# Patient Record
Sex: MALE | Race: BLACK OR AFRICAN AMERICAN | NOT HISPANIC OR LATINO | Employment: OTHER | RURAL
[De-identification: names, ages, dates, MRNs, and addresses within clinical notes are randomized per-mention and may not be internally consistent; named-entity substitution may affect disease eponyms.]

---

## 2020-03-23 ENCOUNTER — HISTORICAL (OUTPATIENT)
Dept: ADMINISTRATIVE | Facility: HOSPITAL | Age: 70
End: 2020-03-23

## 2020-03-23 LAB
APTT PPP: 25.2 SECONDS (ref 25.2–37.3)
BASOPHILS # BLD AUTO: 0.05 X10E3/UL (ref 0–0.2)
BASOPHILS NFR BLD AUTO: 0.9 % (ref 0–1)
BUN SERPL-MCNC: 32 MG/DL (ref 7–18)
CALCIUM SERPL-MCNC: 9.3 MG/DL (ref 8.5–10.1)
CHLORIDE SERPL-SCNC: 102 MMOL/L (ref 98–107)
CK MB SERPL-MCNC: 2.2 NG/ML (ref 1–3.6)
CK MB SERPL-MCNC: 2.6 NG/ML (ref 1–3.6)
CK MB SERPL-MCNC: 2.6 NG/ML (ref 1–3.6)
CK SERPL-CCNC: 161 U/L (ref 39–308)
CK SERPL-CCNC: 179 U/L (ref 39–308)
CK SERPL-CCNC: 181 U/L (ref 39–308)
CO2 SERPL-SCNC: 29 MMOL/L (ref 21–32)
CREAT SERPL-MCNC: 8.41 MG/DL (ref 0.7–1.3)
EOSINOPHIL # BLD AUTO: 0.08 X10E3/UL (ref 0–0.5)
EOSINOPHIL NFR BLD AUTO: 1.4 % (ref 1–4)
ERYTHROCYTE [DISTWIDTH] IN BLOOD BY AUTOMATED COUNT: 15.6 % (ref 11.5–14.5)
GLUCOSE SERPL-MCNC: 95 MG/DL (ref 74–106)
HCT VFR BLD AUTO: 36.6 % (ref 40–54)
HGB BLD-MCNC: 11.3 G/DL (ref 13.5–18)
IMM GRANULOCYTES # BLD AUTO: 0.02 X10E3/UL (ref 0–0.04)
IMM GRANULOCYTES NFR BLD: 0.3 % (ref 0–0.4)
INR BLD: 0.95 (ref 0–3.3)
LYMPHOCYTES # BLD AUTO: 1.1 X10E3/UL (ref 1–4.8)
LYMPHOCYTES NFR BLD AUTO: 18.8 % (ref 27–41)
MAGNESIUM SERPL-MCNC: 2.8 MG/DL (ref 1.7–2.3)
MCH RBC QN AUTO: 30.5 PG (ref 27–31)
MCHC RBC AUTO-ENTMCNC: 30.9 G/DL (ref 32–36)
MCV RBC AUTO: 98.7 FL (ref 80–96)
MONOCYTES # BLD AUTO: 0.38 X10E3/UL (ref 0–0.8)
MONOCYTES NFR BLD AUTO: 6.5 % (ref 2–6)
MPC BLD CALC-MCNC: 11.5 FL (ref 9.4–12.4)
MYOGLOBIN SERPL-MCNC: 329 NG/ML (ref 16–116)
NEUTROPHILS # BLD AUTO: 4.21 X10E3/UL (ref 1.8–7.7)
NEUTROPHILS NFR BLD AUTO: 72.1 % (ref 53–65)
NRBC # BLD AUTO: 0 X10E3/UL (ref 0–0)
NRBC, AUTO (.00): 0 /100 (ref 0–0)
PLATELET # BLD AUTO: 153 X10E3/UL (ref 150–400)
POTASSIUM SERPL-SCNC: 4.5 MMOL/L (ref 3.5–5.1)
PROTHROMBIN TIME: 12.8 SECONDS (ref 11.7–14.7)
RBC # BLD AUTO: 3.71 X10E6/UL (ref 4.6–6.2)
SODIUM SERPL-SCNC: 141 MMOL/L (ref 136–145)
TROPONIN I SERPL-MCNC: 0.06 NG/ML (ref 0–0.06)
TROPONIN I SERPL-MCNC: 0.07 NG/ML (ref 0–0.06)
TROPONIN I SERPL-MCNC: 0.08 NG/ML (ref 0–0.06)
WBC # BLD AUTO: 5.84 X10E3/UL (ref 4.5–11)

## 2020-03-24 ENCOUNTER — HISTORICAL (OUTPATIENT)
Dept: ADMINISTRATIVE | Facility: HOSPITAL | Age: 70
End: 2020-03-24

## 2020-03-24 LAB
ANISOCYTOSIS BLD QL SMEAR: ABNORMAL
BASOPHILS # BLD AUTO: 0.03 X10E3/UL (ref 0–0.2)
BASOPHILS NFR BLD AUTO: 0.7 % (ref 0–1)
BUN SERPL-MCNC: 19 MG/DL (ref 7–18)
CALCIUM SERPL-MCNC: 8.5 MG/DL (ref 8.5–10.1)
CHLORIDE SERPL-SCNC: 103 MMOL/L (ref 98–107)
CO2 SERPL-SCNC: 27 MMOL/L (ref 21–32)
CREAT SERPL-MCNC: 5.29 MG/DL (ref 0.7–1.3)
EOSINOPHIL # BLD AUTO: 0.04 X10E3/UL (ref 0–0.5)
EOSINOPHIL NFR BLD AUTO: 0.9 % (ref 1–4)
ERYTHROCYTE [DISTWIDTH] IN BLOOD BY AUTOMATED COUNT: 15.5 % (ref 11.5–14.5)
GLUCOSE SERPL-MCNC: 121 MG/DL (ref 74–106)
HCT VFR BLD AUTO: 33.4 % (ref 40–54)
HGB BLD-MCNC: 10.6 G/DL (ref 13.5–18)
IMM GRANULOCYTES # BLD AUTO: 0.01 X10E3/UL (ref 0–0.04)
IMM GRANULOCYTES NFR BLD: 0.2 % (ref 0–0.4)
LYMPHOCYTES # BLD AUTO: 1.18 X10E3/UL (ref 1–4.8)
LYMPHOCYTES NFR BLD AUTO: 26 % (ref 27–41)
MCH RBC QN AUTO: 30.9 PG (ref 27–31)
MCHC RBC AUTO-ENTMCNC: 31.7 G/DL (ref 32–36)
MCV RBC AUTO: 97.4 FL (ref 80–96)
MONOCYTES # BLD AUTO: 0.48 X10E3/UL (ref 0–0.8)
MONOCYTES NFR BLD AUTO: 10.6 % (ref 2–6)
MPC BLD CALC-MCNC: 11.7 FL (ref 9.4–12.4)
NEUTROPHILS # BLD AUTO: 2.79 X10E3/UL (ref 1.8–7.7)
NEUTROPHILS NFR BLD AUTO: 61.6 % (ref 53–65)
NRBC # BLD AUTO: 0 X10E3/UL (ref 0–0)
NRBC, AUTO (.00): 0 /100 (ref 0–0)
PLATELET # BLD AUTO: 116 X10E3/UL (ref 150–400)
PLATELET MORPHOLOGY: ABNORMAL
POTASSIUM SERPL-SCNC: 4.5 MMOL/L (ref 3.5–5.1)
RBC # BLD AUTO: 3.43 X10E6/UL (ref 4.6–6.2)
SODIUM SERPL-SCNC: 137 MMOL/L (ref 136–145)
WBC # BLD AUTO: 4.53 X10E3/UL (ref 4.5–11)

## 2020-04-04 ENCOUNTER — HISTORICAL (OUTPATIENT)
Dept: ADMINISTRATIVE | Facility: HOSPITAL | Age: 70
End: 2020-04-04

## 2020-04-04 LAB
ALBUMIN SERPL BCP-MCNC: 3.7 G/DL (ref 3.5–5)
ALBUMIN/GLOB SERPL: 1.1 {RATIO}
ALP SERPL-CCNC: 57 U/L (ref 45–115)
ALT SERPL W P-5'-P-CCNC: 10 U/L (ref 16–61)
ANISOCYTOSIS BLD QL SMEAR: ABNORMAL
APTT PPP: 32 SECONDS (ref 25.2–37.3)
AST SERPL W P-5'-P-CCNC: 18 U/L (ref 15–37)
BASOPHILS # BLD AUTO: 0.04 X10E3/UL (ref 0–0.2)
BASOPHILS NFR BLD AUTO: 1 % (ref 0–1)
BILIRUB SERPL-MCNC: 0.2 MG/DL (ref 0–1.2)
BUN SERPL-MCNC: 27 MG/DL (ref 7–18)
BUN/CREAT SERPL: 3.4
CALCIUM SERPL-MCNC: 9.1 MG/DL (ref 8.5–10.1)
CHLORIDE SERPL-SCNC: 100 MMOL/L (ref 98–107)
CK MB SERPL-MCNC: 1.3 NG/ML (ref 1–3.6)
CK SERPL-CCNC: 162 U/L (ref 39–308)
CO2 SERPL-SCNC: 30 MMOL/L (ref 21–32)
CREAT SERPL-MCNC: 7.89 MG/DL (ref 0.7–1.3)
EOSINOPHIL # BLD AUTO: 0.07 X10E3/UL (ref 0–0.5)
EOSINOPHIL NFR BLD AUTO: 1.7 % (ref 1–4)
ERYTHROCYTE [DISTWIDTH] IN BLOOD BY AUTOMATED COUNT: 14.6 % (ref 11.5–14.5)
GLOBULIN SER-MCNC: 3.5 G/DL (ref 2–4)
GLUCOSE SERPL-MCNC: 107 MG/DL (ref 74–106)
HCT VFR BLD AUTO: 33.6 % (ref 40–54)
HGB BLD-MCNC: 10.8 G/DL (ref 13.5–18)
IMM GRANULOCYTES # BLD AUTO: 0.01 X10E3/UL (ref 0–0.04)
IMM GRANULOCYTES NFR BLD: 0.2 % (ref 0–0.4)
INR BLD: 0.95 (ref 0–3.3)
LYMPHOCYTES # BLD AUTO: 1.48 X10E3/UL (ref 1–4.8)
LYMPHOCYTES NFR BLD AUTO: 36.2 % (ref 27–41)
MAGNESIUM SERPL-MCNC: 2.7 MG/DL (ref 1.7–2.3)
MCH RBC QN AUTO: 31.3 PG (ref 27–31)
MCHC RBC AUTO-ENTMCNC: 32.1 G/DL (ref 32–36)
MCV RBC AUTO: 97.4 FL (ref 80–96)
MONOCYTES # BLD AUTO: 0.4 X10E3/UL (ref 0–0.8)
MONOCYTES NFR BLD AUTO: 9.8 % (ref 2–6)
MPC BLD CALC-MCNC: 12.1 FL (ref 9.4–12.4)
MYOGLOBIN SERPL-MCNC: 341 NG/ML (ref 16–116)
NEUTROPHILS # BLD AUTO: 2.09 X10E3/UL (ref 1.8–7.7)
NEUTROPHILS NFR BLD AUTO: 51.1 % (ref 53–65)
NRBC # BLD AUTO: 0 X10E3/UL (ref 0–0)
NRBC, AUTO (.00): 0 /100 (ref 0–0)
NT-PROBNP SERPL-MCNC: ABNORMAL PG/ML (ref 1–125)
OVALOCYTES BLD QL SMEAR: ABNORMAL
PLATELET # BLD AUTO: 125 X10E3/UL (ref 150–400)
PLATELET MORPHOLOGY: ABNORMAL
POTASSIUM SERPL-SCNC: 3.7 MMOL/L (ref 3.5–5.1)
PROT SERPL-MCNC: 7.2 G/DL (ref 6.4–8.2)
PROTHROMBIN TIME: 12.8 SECONDS (ref 11.7–14.7)
RBC # BLD AUTO: 3.45 X10E6/UL (ref 4.6–6.2)
SODIUM SERPL-SCNC: 140 MMOL/L (ref 136–145)
TROPONIN I SERPL-MCNC: 0.07 NG/ML (ref 0–0.06)
WBC # BLD AUTO: 4.09 X10E3/UL (ref 4.5–11)

## 2020-04-05 ENCOUNTER — HISTORICAL (OUTPATIENT)
Dept: ADMINISTRATIVE | Facility: HOSPITAL | Age: 70
End: 2020-04-05

## 2020-04-05 LAB — TROPONIN I SERPL-MCNC: 0.07 NG/ML (ref 0–0.06)

## 2020-07-06 ENCOUNTER — HISTORICAL (OUTPATIENT)
Dept: ADMINISTRATIVE | Facility: HOSPITAL | Age: 70
End: 2020-07-06

## 2020-07-06 LAB
HGB BLD-MCNC: 9.7 G/DL (ref 13.5–18)
POTASSIUM SERPL-SCNC: 4.5 MMOL/L (ref 3.5–5.1)

## 2020-12-01 ENCOUNTER — HISTORICAL (OUTPATIENT)
Dept: ADMINISTRATIVE | Facility: HOSPITAL | Age: 70
End: 2020-12-01

## 2020-12-01 LAB
ALBUMIN SERPL BCP-MCNC: 3.5 G/DL (ref 3.5–5)
ALP SERPL-CCNC: 66 U/L (ref 45–115)
ALT SERPL W P-5'-P-CCNC: 17 U/L (ref 16–61)
ANION GAP SERPL CALCULATED.3IONS-SCNC: 9 MMOL/L
APTT PPP: 34 SECONDS (ref 25.2–37.3)
AST SERPL W P-5'-P-CCNC: 21 U/L (ref 15–37)
BASOPHILS # BLD AUTO: 0.04 X10E3/UL (ref 0–0.2)
BASOPHILS NFR BLD AUTO: 0.7 % (ref 0–1)
BILIRUB DIRECT SERPL-MCNC: 0.1 MG/DL (ref 0–0.2)
BILIRUB SERPL-MCNC: 0.3 MG/DL (ref 0–1.2)
BUN SERPL-MCNC: 25 MG/DL (ref 7–18)
CALCIUM SERPL-MCNC: 8.2 MG/DL (ref 8.5–10.1)
CHLORIDE SERPL-SCNC: 101 MMOL/L (ref 98–107)
CK MB SERPL-MCNC: 2.2 NG/ML (ref 1–3.6)
CK SERPL-CCNC: 284 U/L (ref 39–308)
CO2 SERPL-SCNC: 33 MMOL/L (ref 21–32)
CREAT SERPL-MCNC: 8.34 MG/DL (ref 0.7–1.3)
EOSINOPHIL # BLD AUTO: 0.12 X10E3/UL (ref 0–0.5)
EOSINOPHIL NFR BLD AUTO: 2.2 % (ref 1–4)
ERYTHROCYTE [DISTWIDTH] IN BLOOD BY AUTOMATED COUNT: 14.6 % (ref 11.5–14.5)
GLUCOSE SERPL-MCNC: 101 MG/DL (ref 74–106)
HCT VFR BLD AUTO: 28.2 % (ref 40–54)
HGB BLD-MCNC: 9.2 G/DL (ref 13.5–18)
IMM GRANULOCYTES # BLD AUTO: 0.01 X10E3/UL (ref 0–0.04)
IMM GRANULOCYTES NFR BLD: 0.2 % (ref 0–0.4)
INR BLD: 1.05 (ref 0–3.3)
LYMPHOCYTES # BLD AUTO: 1.27 X10E3/UL (ref 1–4.8)
LYMPHOCYTES NFR BLD AUTO: 23.4 % (ref 27–41)
MAGNESIUM SERPL-MCNC: 2.8 MG/DL (ref 1.7–2.3)
MCH RBC QN AUTO: 32.4 PG (ref 27–31)
MCHC RBC AUTO-ENTMCNC: 32.6 G/DL (ref 32–36)
MCV RBC AUTO: 99.3 FL (ref 80–96)
MONOCYTES # BLD AUTO: 0.46 X10E3/UL (ref 0–0.8)
MONOCYTES NFR BLD AUTO: 8.5 % (ref 2–6)
MPC BLD CALC-MCNC: 11.3 FL (ref 9.4–12.4)
MYOGLOBIN SERPL-MCNC: 622 NG/ML (ref 16–116)
NEUTROPHILS # BLD AUTO: 3.52 X10E3/UL (ref 1.8–7.7)
NEUTROPHILS NFR BLD AUTO: 65 % (ref 53–65)
NRBC # BLD AUTO: 0 X10E3/UL (ref 0–0)
NRBC, AUTO (.00): 0 /100 (ref 0–0)
PLATELET # BLD AUTO: 142 X10E3/UL (ref 150–400)
POTASSIUM SERPL-SCNC: 4.4 MMOL/L (ref 3.5–5.1)
PROT SERPL-MCNC: 6.7 G/DL (ref 6.4–8.2)
PROTHROMBIN TIME: 13.2 SECONDS (ref 11.7–14.7)
RBC # BLD AUTO: 2.84 X10E6/UL (ref 4.6–6.2)
SARS-COV-2 RNA AMPLIFICATION, QUAL: NEGATIVE
SODIUM SERPL-SCNC: 139 MMOL/L (ref 136–145)
TROPONIN I SERPL-MCNC: 0.08 NG/ML (ref 0–0.06)
WBC # BLD AUTO: 5.42 X10E3/UL (ref 4.5–11)

## 2020-12-02 ENCOUNTER — HISTORICAL (OUTPATIENT)
Dept: ADMINISTRATIVE | Facility: HOSPITAL | Age: 70
End: 2020-12-02

## 2020-12-02 LAB
ANION GAP SERPL CALCULATED.3IONS-SCNC: 11 MMOL/L
ANISOCYTOSIS BLD QL SMEAR: ABNORMAL
BASOPHILS # BLD AUTO: 0.05 X10E3/UL (ref 0–0.2)
BASOPHILS NFR BLD AUTO: 1.1 % (ref 0–1)
BUN SERPL-MCNC: 30 MG/DL (ref 7–18)
CALCIUM SERPL-MCNC: 8 MG/DL (ref 8.5–10.1)
CHLORIDE SERPL-SCNC: 104 MMOL/L (ref 98–107)
CO2 SERPL-SCNC: 30 MMOL/L (ref 21–32)
CREAT SERPL-MCNC: 9.15 MG/DL (ref 0.7–1.3)
EOSINOPHIL # BLD AUTO: 0.12 X10E3/UL (ref 0–0.5)
EOSINOPHIL NFR BLD AUTO: 2.6 % (ref 1–4)
ERYTHROCYTE [DISTWIDTH] IN BLOOD BY AUTOMATED COUNT: 14.8 % (ref 11.5–14.5)
GLUCOSE SERPL-MCNC: 88 MG/DL (ref 74–106)
HCT VFR BLD AUTO: 27.2 % (ref 40–54)
HGB BLD-MCNC: 8.8 G/DL (ref 13.5–18)
HYPOCHROMIA BLD QL SMEAR: SLIGHT
IMM GRANULOCYTES # BLD AUTO: 0.01 X10E3/UL (ref 0–0.04)
IMM GRANULOCYTES NFR BLD: 0.2 % (ref 0–0.4)
LYMPHOCYTES # BLD AUTO: 1.32 X10E3/UL (ref 1–4.8)
LYMPHOCYTES NFR BLD AUTO: 28.6 % (ref 27–41)
MCH RBC QN AUTO: 32.7 PG (ref 27–31)
MCHC RBC AUTO-ENTMCNC: 32.4 G/DL (ref 32–36)
MCV RBC AUTO: 101.1 FL (ref 80–96)
MONOCYTES # BLD AUTO: 0.46 X10E3/UL (ref 0–0.8)
MONOCYTES NFR BLD AUTO: 10 % (ref 2–6)
MPC BLD CALC-MCNC: 11.9 FL (ref 9.4–12.4)
NEUTROPHILS # BLD AUTO: 2.66 X10E3/UL (ref 1.8–7.7)
NEUTROPHILS NFR BLD AUTO: 57.5 % (ref 53–65)
NRBC # BLD AUTO: 0 X10E3/UL (ref 0–0)
NRBC, AUTO (.00): 0 /100 (ref 0–0)
OVALOCYTES BLD QL SMEAR: ABNORMAL
PLATELET # BLD AUTO: 138 X10E3/UL (ref 150–400)
PLATELET MORPHOLOGY: ABNORMAL
POLYCHROMASIA BLD QL SMEAR: ABNORMAL
POTASSIUM SERPL-SCNC: 3.9 MMOL/L (ref 3.5–5.1)
RBC # BLD AUTO: 2.69 X10E6/UL (ref 4.6–6.2)
SODIUM SERPL-SCNC: 141 MMOL/L (ref 136–145)
WBC # BLD AUTO: 4.62 X10E3/UL (ref 4.5–11)

## 2020-12-03 ENCOUNTER — HISTORICAL (OUTPATIENT)
Dept: ADMINISTRATIVE | Facility: HOSPITAL | Age: 70
End: 2020-12-03

## 2020-12-03 LAB
ANION GAP SERPL CALCULATED.3IONS-SCNC: 11 MMOL/L
BASOPHILS # BLD AUTO: 0.04 X10E3/UL (ref 0–0.2)
BASOPHILS NFR BLD AUTO: 0.9 % (ref 0–1)
BUN SERPL-MCNC: 24 MG/DL (ref 7–18)
CALCIUM SERPL-MCNC: 8 MG/DL (ref 8.5–10.1)
CHLORIDE SERPL-SCNC: 100 MMOL/L (ref 98–107)
CO2 SERPL-SCNC: 28 MMOL/L (ref 21–32)
CREAT SERPL-MCNC: 6.79 MG/DL (ref 0.7–1.3)
EOSINOPHIL # BLD AUTO: 0.11 X10E3/UL (ref 0–0.5)
EOSINOPHIL NFR BLD AUTO: 2.4 % (ref 1–4)
ERYTHROCYTE [DISTWIDTH] IN BLOOD BY AUTOMATED COUNT: 14.6 % (ref 11.5–14.5)
GLUCOSE SERPL-MCNC: 102 MG/DL (ref 74–106)
HCT VFR BLD AUTO: 27 % (ref 40–54)
HGB BLD-MCNC: 8.6 G/DL (ref 13.5–18)
IMM GRANULOCYTES # BLD AUTO: 0.02 X10E3/UL (ref 0–0.04)
IMM GRANULOCYTES NFR BLD: 0.4 % (ref 0–0.4)
LYMPHOCYTES # BLD AUTO: 1.17 X10E3/UL (ref 1–4.8)
LYMPHOCYTES NFR BLD AUTO: 25.3 % (ref 27–41)
MCH RBC QN AUTO: 32.3 PG (ref 27–31)
MCHC RBC AUTO-ENTMCNC: 31.9 G/DL (ref 32–36)
MCV RBC AUTO: 101.5 FL (ref 80–96)
MONOCYTES # BLD AUTO: 0.46 X10E3/UL (ref 0–0.8)
MONOCYTES NFR BLD AUTO: 10 % (ref 2–6)
MPC BLD CALC-MCNC: 11.5 FL (ref 9.4–12.4)
NEUTROPHILS # BLD AUTO: 2.82 X10E3/UL (ref 1.8–7.7)
NEUTROPHILS NFR BLD AUTO: 61 % (ref 53–65)
NRBC # BLD AUTO: 0 X10E3/UL (ref 0–0)
NRBC, AUTO (.00): 0 /100 (ref 0–0)
PLATELET # BLD AUTO: 132 X10E3/UL (ref 150–400)
POTASSIUM SERPL-SCNC: 3.7 MMOL/L (ref 3.5–5.1)
RBC # BLD AUTO: 2.66 X10E6/UL (ref 4.6–6.2)
SODIUM SERPL-SCNC: 135 MMOL/L (ref 136–145)
WBC # BLD AUTO: 4.62 X10E3/UL (ref 4.5–11)

## 2020-12-28 ENCOUNTER — HISTORICAL (OUTPATIENT)
Dept: ADMINISTRATIVE | Facility: HOSPITAL | Age: 70
End: 2020-12-28

## 2020-12-28 LAB
ANION GAP SERPL CALCULATED.3IONS-SCNC: 19 MMOL/L
APTT PPP: 36.6 SECONDS (ref 25.2–37.3)
BASOPHILS # BLD AUTO: 0.07 X10E3/UL (ref 0–0.2)
BASOPHILS NFR BLD AUTO: 1.3 % (ref 0–1)
BUN SERPL-MCNC: 45 MG/DL (ref 7–18)
CALCIUM SERPL-MCNC: 8.1 MG/DL (ref 8.5–10.1)
CHLORIDE SERPL-SCNC: 106 MMOL/L (ref 98–107)
CK MB SERPL-MCNC: 2.3 NG/ML (ref 1–3.6)
CK SERPL-CCNC: 205 U/L (ref 39–308)
CO2 SERPL-SCNC: 27 MMOL/L (ref 21–32)
CREAT SERPL-MCNC: 11.7 MG/DL (ref 0.7–1.3)
EOSINOPHIL # BLD AUTO: 0.15 X10E3/UL (ref 0–0.5)
EOSINOPHIL NFR BLD AUTO: 2.8 % (ref 1–4)
ERYTHROCYTE [DISTWIDTH] IN BLOOD BY AUTOMATED COUNT: 14.9 % (ref 11.5–14.5)
GLUCOSE SERPL-MCNC: 111 MG/DL (ref 74–106)
HCT VFR BLD AUTO: 25.7 % (ref 40–54)
HGB BLD-MCNC: 7.9 G/DL (ref 13.5–18)
IMM GRANULOCYTES # BLD AUTO: 0.01 X10E3/UL (ref 0–0.04)
IMM GRANULOCYTES NFR BLD: 0.2 % (ref 0–0.4)
INR BLD: 1.04 (ref 0–3.3)
LYMPHOCYTES # BLD AUTO: 1.2 X10E3/UL (ref 1–4.8)
LYMPHOCYTES NFR BLD AUTO: 22.1 % (ref 27–41)
MAGNESIUM SERPL-MCNC: 3.2 MG/DL (ref 1.7–2.3)
MCH RBC QN AUTO: 31.6 PG (ref 27–31)
MCHC RBC AUTO-ENTMCNC: 30.7 G/DL (ref 32–36)
MCV RBC AUTO: 102.8 FL (ref 80–96)
MONOCYTES # BLD AUTO: 0.49 X10E3/UL (ref 0–0.8)
MONOCYTES NFR BLD AUTO: 9 % (ref 2–6)
MPC BLD CALC-MCNC: 10.7 FL (ref 9.4–12.4)
MYOGLOBIN SERPL-MCNC: 391 NG/ML (ref 16–116)
NEUTROPHILS # BLD AUTO: 3.5 X10E3/UL (ref 1.8–7.7)
NEUTROPHILS NFR BLD AUTO: 64.6 % (ref 53–65)
NRBC # BLD AUTO: 0 X10E3/UL (ref 0–0)
NRBC, AUTO (.00): 0 /100 (ref 0–0)
PLATELET # BLD AUTO: 243 X10E3/UL (ref 150–400)
POTASSIUM SERPL-SCNC: 4.3 MMOL/L (ref 3.5–5.1)
PROTHROMBIN TIME: 13.1 SECONDS (ref 11.7–14.7)
RBC # BLD AUTO: 2.5 X10E6/UL (ref 4.6–6.2)
SARS-COV-2 RNA AMPLIFICATION, QUAL: NEGATIVE
SODIUM SERPL-SCNC: 148 MMOL/L (ref 136–145)
TROPONIN I SERPL-MCNC: 0.09 NG/ML (ref 0–0.06)
WBC # BLD AUTO: 5.42 X10E3/UL (ref 4.5–11)

## 2021-01-28 ENCOUNTER — HISTORICAL (OUTPATIENT)
Dept: ADMINISTRATIVE | Facility: HOSPITAL | Age: 71
End: 2021-01-28

## 2021-02-15 ENCOUNTER — HISTORICAL (OUTPATIENT)
Dept: ADMINISTRATIVE | Facility: HOSPITAL | Age: 71
End: 2021-02-15

## 2021-02-15 LAB
ANION GAP SERPL CALCULATED.3IONS-SCNC: 10 MMOL/L
ANISOCYTOSIS BLD QL SMEAR: ABNORMAL
APTT PPP: 30.5 SECONDS (ref 25.2–37.3)
BASOPHILS # BLD AUTO: 0.06 X10E3/UL (ref 0–0.2)
BASOPHILS NFR BLD AUTO: 0.7 % (ref 0–1)
BUN SERPL-MCNC: 22 MG/DL (ref 7–18)
CALCIUM SERPL-MCNC: 8.1 MG/DL (ref 8.5–10.1)
CHLORIDE SERPL-SCNC: 106 MMOL/L (ref 98–107)
CK MB SERPL-MCNC: 2.3 NG/ML (ref 1–3.6)
CK SERPL-CCNC: 173 U/L (ref 39–308)
CO2 SERPL-SCNC: 35 MMOL/L (ref 21–32)
CO2 SERPL-SCNC: 35 MMOL/L (ref 22–28)
CREAT SERPL-MCNC: 8.85 MG/DL (ref 0.7–1.3)
EOSINOPHIL # BLD AUTO: 0.15 X10E3/UL (ref 0–0.5)
EOSINOPHIL NFR BLD AUTO: 1.8 % (ref 1–4)
ERYTHROCYTE [DISTWIDTH] IN BLOOD BY AUTOMATED COUNT: 15 % (ref 11.5–14.5)
FLUAV AG UPPER RESP QL IA.RAPID: NEGATIVE
FLUBV AG UPPER RESP QL IA.RAPID: NEGATIVE
GLUCOSE SERPL-MCNC: 108 MG/DL (ref 74–106)
GLUCOSE SERPL-MCNC: 119 MG/DL (ref 60–95)
HCO3 UR-SCNC: 33 MMOL/L (ref 21–28)
HCT VFR BLD AUTO: 36.4 % (ref 40–54)
HCT VFR BLD AUTO: 37 % (ref 35–51)
HGB BLD-MCNC: 11 G/DL (ref 13.5–18)
IMM GRANULOCYTES # BLD AUTO: 0.06 X10E3/UL (ref 0–0.04)
IMM GRANULOCYTES NFR BLD: 0.7 % (ref 0–0.4)
INR BLD: 1.02 (ref 0–3.3)
LDH SERPL L TO P-CCNC: 0.7 MMOL/L (ref 0.3–1.2)
LYMPHOCYTES # BLD AUTO: 0.83 X10E3/UL (ref 1–4.8)
LYMPHOCYTES NFR BLD AUTO: 9.9 % (ref 27–41)
MACROCYTES BLD QL SMEAR: ABNORMAL
MAGNESIUM SERPL-MCNC: 3 MG/DL (ref 1.7–2.3)
MCH RBC QN AUTO: 31.2 PG (ref 27–31)
MCHC RBC AUTO-ENTMCNC: 30.2 G/DL (ref 32–36)
MCV RBC AUTO: 103.1 FL (ref 80–96)
MONOCYTES # BLD AUTO: 0.49 X10E3/UL (ref 0–0.8)
MONOCYTES NFR BLD AUTO: 5.8 % (ref 2–6)
MPC BLD CALC-MCNC: 12 FL (ref 9.4–12.4)
MYOGLOBIN SERPL-MCNC: 356 NG/ML (ref 16–116)
NEUTROPHILS # BLD AUTO: 6.8 X10E3/UL (ref 1.8–7.7)
NEUTROPHILS NFR BLD AUTO: 81.1 % (ref 53–65)
NRBC # BLD AUTO: 0 X10E3/UL (ref 0–0)
NRBC, AUTO (.00): 0.2 /100 (ref 0–0)
OVALOCYTES BLD QL SMEAR: ABNORMAL
PCO2 BLDA: 66 MM HG (ref 35–48)
PH SMN: 7.31 PH UNITS (ref 7.35–7.45)
PLATELET # BLD AUTO: 140 X10E3/UL (ref 150–400)
PLATELET MORPHOLOGY: ABNORMAL
PO2 BLDA: 62 MM HG (ref 83–108)
POC BASE EXCESS ARTERIAL: 5.2 MMOL/L (ref -2–3)
POC IONIZED CALCIUM: 1.09 MMOL/L (ref 1.15–1.35)
POC SATURATED O2: 89 % (ref 95–98)
POLYCHROMASIA BLD QL SMEAR: ABNORMAL
POTASSIUM SERPL-SCNC: 4.7 MMOL/L (ref 3.4–4.5)
POTASSIUM SERPL-SCNC: 4.9 MMOL/L (ref 3.5–5.1)
PROTHROMBIN TIME: 12.9 SECONDS (ref 11.7–14.7)
RBC # BLD AUTO: 3.53 X10E6/UL (ref 4.6–6.2)
SARS-COV+SARS-COV-2 AG RESP QL IA.RAPID: NEGATIVE
SODIUM SERPL-SCNC: 139 MMOL/L (ref 136–145)
SODIUM SERPL-SCNC: 146 MMOL/L (ref 136–145)
TROPONIN I SERPL-MCNC: 0.07 NG/ML (ref 0–0.06)
WBC # BLD AUTO: 8.39 X10E3/UL (ref 4.5–11)

## 2021-02-16 ENCOUNTER — HISTORICAL (OUTPATIENT)
Dept: ADMINISTRATIVE | Facility: HOSPITAL | Age: 71
End: 2021-02-16

## 2021-02-17 ENCOUNTER — HISTORICAL (OUTPATIENT)
Dept: ADMINISTRATIVE | Facility: HOSPITAL | Age: 71
End: 2021-02-17

## 2021-02-17 LAB
ANION GAP SERPL CALCULATED.3IONS-SCNC: 12 MMOL/L
BASOPHILS # BLD AUTO: 0.03 X10E3/UL (ref 0–0.2)
BASOPHILS NFR BLD AUTO: 0.4 % (ref 0–1)
BUN SERPL-MCNC: 22 MG/DL (ref 7–18)
CALCIUM SERPL-MCNC: 8.7 MG/DL (ref 8.5–10.1)
CHLORIDE SERPL-SCNC: 103 MMOL/L (ref 98–107)
CO2 SERPL-SCNC: 28 MMOL/L (ref 21–32)
CREAT SERPL-MCNC: 7.09 MG/DL (ref 0.7–1.3)
EOSINOPHIL # BLD AUTO: 0.04 X10E3/UL (ref 0–0.5)
EOSINOPHIL NFR BLD AUTO: 0.5 % (ref 1–4)
ERYTHROCYTE [DISTWIDTH] IN BLOOD BY AUTOMATED COUNT: 15.7 % (ref 11.5–14.5)
GLUCOSE SERPL-MCNC: 96 MG/DL (ref 74–106)
HAV IGM SER QL: NORMAL
HBV CORE IGM SER QL: NORMAL
HBV SURFACE AG SERPL QL IA: NORMAL
HCT VFR BLD AUTO: 36.7 % (ref 40–54)
HCV AB SER QL: NORMAL
HGB BLD-MCNC: 11.2 G/DL (ref 13.5–18)
IMM GRANULOCYTES # BLD AUTO: 0.05 X10E3/UL (ref 0–0.04)
IMM GRANULOCYTES NFR BLD: 0.6 % (ref 0–0.4)
LYMPHOCYTES # BLD AUTO: 1.07 X10E3/UL (ref 1–4.8)
LYMPHOCYTES NFR BLD AUTO: 12.8 % (ref 27–41)
MAGNESIUM SERPL-MCNC: 2.6 MG/DL (ref 1.7–2.3)
MCH RBC QN AUTO: 30.9 PG (ref 27–31)
MCHC RBC AUTO-ENTMCNC: 30.5 G/DL (ref 32–36)
MCV RBC AUTO: 101.1 FL (ref 80–96)
MONOCYTES # BLD AUTO: 0.55 X10E3/UL (ref 0–0.8)
MONOCYTES NFR BLD AUTO: 6.6 % (ref 2–6)
MPC BLD CALC-MCNC: 12.2 FL (ref 9.4–12.4)
NEUTROPHILS # BLD AUTO: 6.6 X10E3/UL (ref 1.8–7.7)
NEUTROPHILS NFR BLD AUTO: 79.1 % (ref 53–65)
NRBC # BLD AUTO: 0.1 X10E3/UL (ref 0–0)
NRBC, AUTO (.00): 0.6 /100 (ref 0–0)
PHOSPHATE SERPL-MCNC: 2.6 MG/DL (ref 2.5–4.5)
PLATELET # BLD AUTO: 141 X10E3/UL (ref 150–400)
POTASSIUM SERPL-SCNC: 4.6 MMOL/L (ref 3.5–5.1)
RBC # BLD AUTO: 3.63 X10E6/UL (ref 4.6–6.2)
SODIUM SERPL-SCNC: 138 MMOL/L (ref 136–145)
WBC # BLD AUTO: 8.34 X10E3/UL (ref 4.5–11)

## 2021-02-18 ENCOUNTER — HISTORICAL (OUTPATIENT)
Dept: ADMINISTRATIVE | Facility: HOSPITAL | Age: 71
End: 2021-02-18

## 2021-02-18 LAB
ANION GAP SERPL CALCULATED.3IONS-SCNC: 14 MMOL/L
ANISOCYTOSIS BLD QL SMEAR: ABNORMAL
BASOPHILS # BLD AUTO: 0.03 X10E3/UL (ref 0–0.2)
BASOPHILS NFR BLD AUTO: 0.5 % (ref 0–1)
BUN SERPL-MCNC: 24 MG/DL (ref 7–18)
CALCIUM SERPL-MCNC: 8.5 MG/DL (ref 8.5–10.1)
CHLORIDE SERPL-SCNC: 101 MMOL/L (ref 98–107)
CO2 SERPL-SCNC: 26 MMOL/L (ref 21–32)
CREAT SERPL-MCNC: 6.37 MG/DL (ref 0.7–1.3)
EOSINOPHIL # BLD AUTO: 0.06 X10E3/UL (ref 0–0.5)
EOSINOPHIL NFR BLD AUTO: 1.1 % (ref 1–4)
EOSINOPHIL NFR BLD MANUAL: 1 % (ref 1–4)
ERYTHROCYTE [DISTWIDTH] IN BLOOD BY AUTOMATED COUNT: 15.5 % (ref 11.5–14.5)
GLUCOSE SERPL-MCNC: 88 MG/DL (ref 74–106)
HCT VFR BLD AUTO: 40.5 % (ref 40–54)
HGB BLD-MCNC: 12.6 G/DL (ref 13.5–18)
HYPOCHROMIA BLD QL SMEAR: SLIGHT
IMM GRANULOCYTES # BLD AUTO: 0.03 X10E3/UL (ref 0–0.04)
IMM GRANULOCYTES NFR BLD: 0.5 % (ref 0–0.4)
LYMPHOCYTES # BLD AUTO: 1.15 X10E3/UL (ref 1–4.8)
LYMPHOCYTES NFR BLD AUTO: 20.4 % (ref 27–41)
LYMPHOCYTES NFR BLD MANUAL: 17 % (ref 27–41)
MACROCYTES BLD QL SMEAR: ABNORMAL
MCH RBC QN AUTO: 31.1 PG (ref 27–31)
MCHC RBC AUTO-ENTMCNC: 31.1 G/DL (ref 32–36)
MCV RBC AUTO: 100 FL (ref 80–96)
MONOCYTES # BLD AUTO: 0.58 X10E3/UL (ref 0–0.8)
MONOCYTES NFR BLD AUTO: 10.3 % (ref 2–6)
MONOCYTES NFR BLD MANUAL: 10 % (ref 2–6)
MPC BLD CALC-MCNC: 12.3 FL (ref 9.4–12.4)
NEUTROPHILS # BLD AUTO: 3.78 X10E3/UL (ref 1.8–7.7)
NEUTROPHILS NFR BLD AUTO: 67.2 % (ref 53–65)
NEUTS SEG NFR BLD MANUAL: 72 % (ref 50–62)
NRBC # BLD AUTO: 0.1 X10E3/UL (ref 0–0)
NRBC BLD MANUAL-RTO: 1 /100 (ref 0–0)
NRBC, AUTO (.00): 1.1 /100 (ref 0–0)
PLATELET # BLD AUTO: 145 X10E3/UL (ref 150–400)
POLYCHROMASIA BLD QL SMEAR: ABNORMAL
POTASSIUM SERPL-SCNC: 4.4 MMOL/L (ref 3.5–5.1)
RBC # BLD AUTO: 4.05 X10E6/UL (ref 4.6–6.2)
SODIUM SERPL-SCNC: 137 MMOL/L (ref 136–145)
WBC # BLD AUTO: 5.63 X10E3/UL (ref 4.5–11)

## 2021-03-19 ENCOUNTER — HOSPITAL ENCOUNTER (OUTPATIENT)
Facility: HOSPITAL | Age: 71
Discharge: HOME | End: 2021-03-20
Attending: EMERGENCY MEDICINE | Admitting: HOSPITALIST
Payer: MEDICARE

## 2021-03-19 DIAGNOSIS — I50.9 CHF (CONGESTIVE HEART FAILURE): ICD-10-CM

## 2021-03-19 DIAGNOSIS — J81.0 ACUTE PULMONARY EDEMA: Primary | ICD-10-CM

## 2021-03-19 DIAGNOSIS — I10 HYPERTENSION, UNSPECIFIED TYPE: ICD-10-CM

## 2021-03-19 DIAGNOSIS — I50.9 CONGESTIVE HEART FAILURE, UNSPECIFIED HF CHRONICITY, UNSPECIFIED HEART FAILURE TYPE: ICD-10-CM

## 2021-03-19 DIAGNOSIS — I10 ESSENTIAL HYPERTENSION: Chronic | ICD-10-CM

## 2021-03-19 DIAGNOSIS — R79.89 ELEVATED TROPONIN: ICD-10-CM

## 2021-03-19 DIAGNOSIS — R06.02 SHORTNESS OF BREATH: ICD-10-CM

## 2021-03-19 DIAGNOSIS — N18.6 ESRD (END STAGE RENAL DISEASE): Chronic | ICD-10-CM

## 2021-03-19 DIAGNOSIS — N18.5 CHRONIC RENAL FAILURE, STAGE 5: ICD-10-CM

## 2021-03-19 PROBLEM — I25.10 CORONARY ARTERY DISEASE INVOLVING NATIVE CORONARY ARTERY OF NATIVE HEART WITHOUT ANGINA PECTORIS: Status: ACTIVE | Noted: 2021-03-19

## 2021-03-19 PROBLEM — I25.10 CAD (CORONARY ARTERY DISEASE): Status: ACTIVE | Noted: 2021-03-19

## 2021-03-19 PROBLEM — I25.10 CORONARY ARTERY DISEASE INVOLVING NATIVE CORONARY ARTERY OF NATIVE HEART WITHOUT ANGINA PECTORIS: Status: RESOLVED | Noted: 2021-03-19 | Resolved: 2021-03-19

## 2021-03-19 LAB
ALBUMIN SERPL BCP-MCNC: 3.8 G/DL (ref 3.5–5)
ALBUMIN/GLOB SERPL: 1 {RATIO}
ALP SERPL-CCNC: 142 U/L (ref 45–115)
ALT SERPL W P-5'-P-CCNC: 56 U/L (ref 16–61)
ANION GAP SERPL CALCULATED.3IONS-SCNC: 14 MMOL/L
AST SERPL W P-5'-P-CCNC: 29 U/L (ref 15–37)
BASOPHILS # BLD AUTO: 0.06 X10E3/UL (ref 0–0.2)
BASOPHILS NFR BLD AUTO: 1.1 % (ref 0–1)
BILIRUB SERPL-MCNC: 0.3 MG/DL (ref 0–1.2)
BUN SERPL-MCNC: 37 MG/DL (ref 7–18)
BUN/CREAT SERPL: 4.3
CALCIUM SERPL-MCNC: 8.6 MG/DL (ref 8.5–10.1)
CHLORIDE SERPL-SCNC: 106 MMOL/L (ref 98–107)
CK MB SERPL-MCNC: 3.6 NG/ML (ref 1–3.6)
CK MB SERPL-MCNC: 4 NG/ML (ref 1–3.6)
CK SERPL-CCNC: 187 U/L (ref 39–308)
CK SERPL-CCNC: 189 U/L (ref 39–308)
CO2 SERPL-SCNC: 29 MMOL/L (ref 21–32)
CREAT SERPL-MCNC: 8.67 MG/DL (ref 0.7–1.3)
EOSINOPHIL # BLD AUTO: 0.11 X10E3/UL (ref 0–0.5)
EOSINOPHIL NFR BLD AUTO: 2.1 % (ref 1–4)
ERYTHROCYTE [DISTWIDTH] IN BLOOD BY AUTOMATED COUNT: 15.2 % (ref 11.5–14.5)
FLUAV AG UPPER RESP QL IA.RAPID: NEGATIVE
FLUBV AG UPPER RESP QL IA.RAPID: NEGATIVE
GLOBULIN SER-MCNC: 3.9 G/DL (ref 2–4)
GLUCOSE SERPL-MCNC: 82 MG/DL (ref 70–105)
GLUCOSE SERPL-MCNC: 99 MG/DL (ref 74–106)
HCT VFR BLD AUTO: 40.7 % (ref 40–54)
HGB BLD-MCNC: 12.8 G/DL (ref 13.5–18)
IMM GRANULOCYTES # BLD AUTO: 0.01 X10E3/UL (ref 0–0.04)
IMM GRANULOCYTES NFR BLD: 0.2 % (ref 0–0.4)
INR BLD: 0.97 (ref 0–3.3)
LYMPHOCYTES # BLD AUTO: 0.97 X10E3/UL (ref 1–4.8)
LYMPHOCYTES NFR BLD AUTO: 18.6 % (ref 27–41)
MCH RBC QN AUTO: 30.5 PG (ref 27–31)
MCHC RBC AUTO-ENTMCNC: 31.4 G/DL (ref 32–36)
MCV RBC AUTO: 97.1 FL (ref 80–96)
MONOCYTES # BLD AUTO: 0.44 X10E3/UL (ref 0–0.8)
MONOCYTES NFR BLD AUTO: 8.4 % (ref 2–6)
MPC BLD CALC-MCNC: 12 FL (ref 9.4–12.4)
NEUTROPHILS # BLD AUTO: 3.63 X10E3/UL (ref 1.8–7.7)
NEUTROPHILS NFR BLD AUTO: 69.6 % (ref 53–65)
NRBC # BLD AUTO: 0 X10E3/UL (ref 0–0)
NRBC, AUTO (.00): 0 /100 (ref 0–0)
NT-PROBNP SERPL-MCNC: ABNORMAL PG/ML (ref 1–125)
PLATELET # BLD AUTO: 157 X10E3/UL (ref 150–400)
POTASSIUM SERPL-SCNC: 4.3 MMOL/L (ref 3.5–5.1)
PROT SERPL-MCNC: 7.7 G/DL (ref 6.4–8.2)
PROTHROMBIN TIME: 12.4 SECONDS (ref 11.7–14.7)
RBC # BLD AUTO: 4.19 X10E6/UL (ref 4.6–6.2)
SARS-COV+SARS-COV-2 AG RESP QL IA.RAPID: NEGATIVE
SODIUM SERPL-SCNC: 145 MMOL/L (ref 136–145)
TROPONIN I SERPL-MCNC: 0.09 NG/ML (ref 0–0.06)
TROPONIN I SERPL-MCNC: 0.1 NG/ML (ref 0–0.06)
TROPONIN I SERPL-MCNC: 0.11 NG/ML (ref 0–0.06)
WBC # BLD AUTO: 5.22 X10E3/UL (ref 4.5–11)

## 2021-03-19 PROCEDURE — 93005 ELECTROCARDIOGRAM TRACING: CPT

## 2021-03-19 PROCEDURE — 85610 PROTHROMBIN TIME: CPT

## 2021-03-19 PROCEDURE — 82553 CREATINE MB FRACTION: CPT

## 2021-03-19 PROCEDURE — 99283 PR EMERGENCY DEPT VISIT,LEVEL III: ICD-10-PCS | Mod: CS,,, | Performed by: EMERGENCY MEDICINE

## 2021-03-19 PROCEDURE — 96375 TX/PRO/DX INJ NEW DRUG ADDON: CPT | Mod: 59

## 2021-03-19 PROCEDURE — 25000003 PHARM REV CODE 250: Performed by: NURSE PRACTITIONER

## 2021-03-19 PROCEDURE — 96374 THER/PROPH/DIAG INJ IV PUSH: CPT

## 2021-03-19 PROCEDURE — 63600175 PHARM REV CODE 636 W HCPCS: Performed by: EMERGENCY MEDICINE

## 2021-03-19 PROCEDURE — 63600175 PHARM REV CODE 636 W HCPCS: Performed by: NURSE PRACTITIONER

## 2021-03-19 PROCEDURE — G0378 HOSPITAL OBSERVATION PER HR: HCPCS

## 2021-03-19 PROCEDURE — 25000242 PHARM REV CODE 250 ALT 637 W/ HCPCS: Performed by: NURSE PRACTITIONER

## 2021-03-19 PROCEDURE — 90935 HEMODIALYSIS ONE EVALUATION: CPT

## 2021-03-19 PROCEDURE — 84484 ASSAY OF TROPONIN QUANT: CPT | Mod: 91

## 2021-03-19 PROCEDURE — 94640 AIRWAY INHALATION TREATMENT: CPT

## 2021-03-19 PROCEDURE — 99220 PR INITIAL OBSERVATION CARE,LEVL III: ICD-10-PCS | Mod: 25,,, | Performed by: HOSPITALIST

## 2021-03-19 PROCEDURE — 93010 ELECTROCARDIOGRAM REPORT: CPT | Mod: ,,, | Performed by: HOSPITALIST

## 2021-03-19 PROCEDURE — 83880 ASSAY OF NATRIURETIC PEPTIDE: CPT

## 2021-03-19 PROCEDURE — 93010 ELECTROCARDIOGRAM REPORT: CPT | Mod: 77,,, | Performed by: INTERNAL MEDICINE

## 2021-03-19 PROCEDURE — 99285 EMERGENCY DEPT VISIT HI MDM: CPT | Mod: 25

## 2021-03-19 PROCEDURE — 96372 THER/PROPH/DIAG INJ SC/IM: CPT | Mod: 59

## 2021-03-19 PROCEDURE — 93005 ELECTROCARDIOGRAM TRACING: CPT | Mod: 59

## 2021-03-19 PROCEDURE — 93010 EKG 12-LEAD: ICD-10-PCS | Mod: 77,,, | Performed by: INTERNAL MEDICINE

## 2021-03-19 PROCEDURE — 84484 ASSAY OF TROPONIN QUANT: CPT

## 2021-03-19 PROCEDURE — 96376 TX/PRO/DX INJ SAME DRUG ADON: CPT

## 2021-03-19 PROCEDURE — 36415 COLL VENOUS BLD VENIPUNCTURE: CPT

## 2021-03-19 PROCEDURE — 99220 PR INITIAL OBSERVATION CARE,LEVL III: CPT | Mod: 25,,, | Performed by: HOSPITALIST

## 2021-03-19 PROCEDURE — 85025 COMPLETE CBC W/AUTO DIFF WBC: CPT

## 2021-03-19 PROCEDURE — 82962 GLUCOSE BLOOD TEST: CPT

## 2021-03-19 PROCEDURE — 82550 ASSAY OF CK (CPK): CPT

## 2021-03-19 PROCEDURE — 99283 EMERGENCY DEPT VISIT LOW MDM: CPT | Mod: CS,,, | Performed by: EMERGENCY MEDICINE

## 2021-03-19 PROCEDURE — 80053 COMPREHEN METABOLIC PANEL: CPT

## 2021-03-19 PROCEDURE — 87428 SARSCOV & INF VIR A&B AG IA: CPT

## 2021-03-19 PROCEDURE — 93010 EKG 12-LEAD: ICD-10-PCS | Mod: ,,, | Performed by: HOSPITALIST

## 2021-03-19 PROCEDURE — 25000003 PHARM REV CODE 250: Performed by: EMERGENCY MEDICINE

## 2021-03-19 PROCEDURE — 63600175 PHARM REV CODE 636 W HCPCS

## 2021-03-19 RX ORDER — ONDANSETRON 2 MG/ML
4 INJECTION INTRAMUSCULAR; INTRAVENOUS EVERY 8 HOURS PRN
Status: DISCONTINUED | OUTPATIENT
Start: 2021-03-19 | End: 2021-03-21 | Stop reason: HOSPADM

## 2021-03-19 RX ORDER — HYDRALAZINE HYDROCHLORIDE 20 MG/ML
10 INJECTION INTRAMUSCULAR; INTRAVENOUS
Status: COMPLETED | OUTPATIENT
Start: 2021-03-19 | End: 2021-03-19

## 2021-03-19 RX ORDER — ALLOPURINOL 100 MG/1
100 TABLET ORAL DAILY
Status: DISCONTINUED | OUTPATIENT
Start: 2021-03-19 | End: 2021-03-21 | Stop reason: HOSPADM

## 2021-03-19 RX ORDER — ALLOPURINOL 100 MG/1
100 TABLET ORAL DAILY
COMMUNITY

## 2021-03-19 RX ORDER — ACETAMINOPHEN 325 MG/1
650 TABLET ORAL EVERY 8 HOURS PRN
Status: DISCONTINUED | OUTPATIENT
Start: 2021-03-19 | End: 2021-03-21 | Stop reason: HOSPADM

## 2021-03-19 RX ORDER — HYDRALAZINE HYDROCHLORIDE 100 MG/1
100 TABLET, FILM COATED ORAL 3 TIMES DAILY
Status: DISCONTINUED | OUTPATIENT
Start: 2021-03-19 | End: 2021-03-21 | Stop reason: HOSPADM

## 2021-03-19 RX ORDER — CARVEDILOL 25 MG/1
25 TABLET ORAL 2 TIMES DAILY
Status: DISCONTINUED | OUTPATIENT
Start: 2021-03-19 | End: 2021-03-21 | Stop reason: HOSPADM

## 2021-03-19 RX ORDER — IPRATROPIUM BROMIDE AND ALBUTEROL SULFATE 2.5; .5 MG/3ML; MG/3ML
3 SOLUTION RESPIRATORY (INHALATION) EVERY 4 HOURS
Status: DISCONTINUED | OUTPATIENT
Start: 2021-03-19 | End: 2021-03-21 | Stop reason: HOSPADM

## 2021-03-19 RX ORDER — GLUCAGON 1 MG
1 KIT INJECTION
Status: DISCONTINUED | OUTPATIENT
Start: 2021-03-19 | End: 2021-03-21 | Stop reason: HOSPADM

## 2021-03-19 RX ORDER — HYDRALAZINE HYDROCHLORIDE 20 MG/ML
INJECTION INTRAMUSCULAR; INTRAVENOUS
Status: COMPLETED
Start: 2021-03-19 | End: 2021-03-19

## 2021-03-19 RX ORDER — HEPARIN SODIUM 5000 [USP'U]/ML
5000 INJECTION, SOLUTION INTRAVENOUS; SUBCUTANEOUS EVERY 12 HOURS
Status: DISCONTINUED | OUTPATIENT
Start: 2021-03-19 | End: 2021-03-21 | Stop reason: HOSPADM

## 2021-03-19 RX ORDER — PRAVASTATIN SODIUM 40 MG/1
40 TABLET ORAL NIGHTLY
COMMUNITY

## 2021-03-19 RX ORDER — CARVEDILOL 25 MG/1
25 TABLET ORAL 2 TIMES DAILY
COMMUNITY

## 2021-03-19 RX ORDER — HYDRALAZINE HYDROCHLORIDE 100 MG/1
100 TABLET, FILM COATED ORAL 3 TIMES DAILY
COMMUNITY

## 2021-03-19 RX ORDER — PANTOPRAZOLE SODIUM 40 MG/1
40 TABLET, DELAYED RELEASE ORAL DAILY
COMMUNITY

## 2021-03-19 RX ORDER — ASPIRIN 81 MG/1
81 TABLET ORAL DAILY
Status: DISCONTINUED | OUTPATIENT
Start: 2021-03-19 | End: 2021-03-21 | Stop reason: HOSPADM

## 2021-03-19 RX ORDER — HYDRALAZINE HYDROCHLORIDE 20 MG/ML
INJECTION INTRAMUSCULAR; INTRAVENOUS
Status: DISPENSED
Start: 2021-03-19 | End: 2021-03-19

## 2021-03-19 RX ORDER — FUROSEMIDE 10 MG/ML
80 INJECTION INTRAMUSCULAR; INTRAVENOUS
Status: COMPLETED | OUTPATIENT
Start: 2021-03-19 | End: 2021-03-19

## 2021-03-19 RX ORDER — FUROSEMIDE 10 MG/ML
80 INJECTION INTRAMUSCULAR; INTRAVENOUS ONCE
Status: DISCONTINUED | OUTPATIENT
Start: 2021-03-19 | End: 2021-03-19

## 2021-03-19 RX ORDER — HYDRALAZINE HYDROCHLORIDE 20 MG/ML
20 INJECTION INTRAMUSCULAR; INTRAVENOUS
Status: COMPLETED | OUTPATIENT
Start: 2021-03-19 | End: 2021-03-19

## 2021-03-19 RX ORDER — SODIUM CHLORIDE 0.9 % (FLUSH) 0.9 %
10 SYRINGE (ML) INJECTION
Status: DISCONTINUED | OUTPATIENT
Start: 2021-03-19 | End: 2021-03-21 | Stop reason: HOSPADM

## 2021-03-19 RX ORDER — ATORVASTATIN CALCIUM 40 MG/1
40 TABLET, FILM COATED ORAL NIGHTLY
Status: DISCONTINUED | OUTPATIENT
Start: 2021-03-19 | End: 2021-03-21 | Stop reason: HOSPADM

## 2021-03-19 RX ORDER — ASPIRIN 81 MG/1
81 TABLET ORAL DAILY
COMMUNITY

## 2021-03-19 RX ORDER — PANTOPRAZOLE SODIUM 40 MG/1
40 TABLET, DELAYED RELEASE ORAL DAILY
Status: DISCONTINUED | OUTPATIENT
Start: 2021-03-19 | End: 2021-03-21 | Stop reason: HOSPADM

## 2021-03-19 RX ADMIN — IPRATROPIUM BROMIDE AND ALBUTEROL SULFATE 3 ML: .5; 3 SOLUTION RESPIRATORY (INHALATION) at 09:03

## 2021-03-19 RX ADMIN — ASPIRIN 81 MG: 81 TABLET, COATED ORAL at 02:03

## 2021-03-19 RX ADMIN — HYDRALAZINE HYDROCHLORIDE 100 MG: 100 TABLET, FILM COATED ORAL at 02:03

## 2021-03-19 RX ADMIN — NITROGLYCERIN 1 INCH: 20 OINTMENT TOPICAL at 06:03

## 2021-03-19 RX ADMIN — ALLOPURINOL 100 MG: 100 TABLET ORAL at 02:03

## 2021-03-19 RX ADMIN — HYDRALAZINE HYDROCHLORIDE 100 MG: 100 TABLET, FILM COATED ORAL at 09:03

## 2021-03-19 RX ADMIN — FUROSEMIDE 80 MG: 10 INJECTION, SOLUTION INTRAVENOUS at 08:03

## 2021-03-19 RX ADMIN — HYDRALAZINE HYDROCHLORIDE 10 MG: 20 INJECTION, SOLUTION INTRAMUSCULAR; INTRAVENOUS at 09:03

## 2021-03-19 RX ADMIN — TICAGRELOR 90 MG: 90 TABLET ORAL at 02:03

## 2021-03-19 RX ADMIN — HEPARIN SODIUM 5000 UNITS: 5000 INJECTION INTRAVENOUS; SUBCUTANEOUS at 09:03

## 2021-03-19 RX ADMIN — CARVEDILOL 25 MG: 25 TABLET, FILM COATED ORAL at 09:03

## 2021-03-19 RX ADMIN — HYDRALAZINE HYDROCHLORIDE 20 MG: 20 INJECTION, SOLUTION INTRAMUSCULAR; INTRAVENOUS at 06:03

## 2021-03-19 RX ADMIN — ATORVASTATIN CALCIUM 40 MG: 40 TABLET, FILM COATED ORAL at 09:03

## 2021-03-19 RX ADMIN — PANTOPRAZOLE SODIUM 40 MG: 40 TABLET, DELAYED RELEASE ORAL at 02:03

## 2021-03-19 RX ADMIN — HYDRALAZINE HYDROCHLORIDE 10 MG: 20 INJECTION, SOLUTION INTRAMUSCULAR; INTRAVENOUS at 08:03

## 2021-03-19 RX ADMIN — TICAGRELOR 90 MG: 90 TABLET ORAL at 09:03

## 2021-03-19 RX ADMIN — CARVEDILOL 25 MG: 25 TABLET, FILM COATED ORAL at 02:03

## 2021-03-19 RX ADMIN — HYDRALAZINE HYDROCHLORIDE 20 MG: 20 INJECTION INTRAMUSCULAR; INTRAVENOUS at 06:03

## 2021-03-20 VITALS
HEIGHT: 71 IN | RESPIRATION RATE: 20 BRPM | HEART RATE: 57 BPM | SYSTOLIC BLOOD PRESSURE: 135 MMHG | WEIGHT: 151.88 LBS | TEMPERATURE: 98 F | BODY MASS INDEX: 21.26 KG/M2 | OXYGEN SATURATION: 93 % | DIASTOLIC BLOOD PRESSURE: 80 MMHG

## 2021-03-20 PROBLEM — R79.89 ELEVATED TROPONIN: Status: RESOLVED | Noted: 2021-03-19 | Resolved: 2021-03-20

## 2021-03-20 LAB
ANION GAP SERPL CALCULATED.3IONS-SCNC: 15 MMOL/L
BASOPHILS # BLD AUTO: 0.04 X10E3/UL (ref 0–0.2)
BASOPHILS NFR BLD AUTO: 1.2 % (ref 0–1)
BUN SERPL-MCNC: 38 MG/DL (ref 7–18)
CALCIUM SERPL-MCNC: 8.1 MG/DL (ref 8.5–10.1)
CHLORIDE SERPL-SCNC: 104 MMOL/L (ref 98–107)
CO2 SERPL-SCNC: 27 MMOL/L (ref 21–32)
CREAT SERPL-MCNC: 8.54 MG/DL (ref 0.7–1.3)
EOSINOPHIL # BLD AUTO: 0.11 X10E3/UL (ref 0–0.5)
EOSINOPHIL NFR BLD AUTO: 3.3 % (ref 1–4)
ERYTHROCYTE [DISTWIDTH] IN BLOOD BY AUTOMATED COUNT: 15.2 % (ref 11.5–14.5)
GLUCOSE SERPL-MCNC: 111 MG/DL (ref 74–106)
HCT VFR BLD AUTO: 37.4 % (ref 40–54)
HGB BLD-MCNC: 11.6 G/DL (ref 13.5–18)
IMM GRANULOCYTES # BLD AUTO: 0.01 X10E3/UL (ref 0–0.04)
IMM GRANULOCYTES NFR BLD: 0.3 % (ref 0–0.4)
LYMPHOCYTES # BLD AUTO: 0.96 X10E3/UL (ref 1–4.8)
LYMPHOCYTES NFR BLD AUTO: 28.4 % (ref 27–41)
MAGNESIUM SERPL-MCNC: 2.9 MG/DL (ref 1.7–2.3)
MCH RBC QN AUTO: 29.4 PG (ref 27–31)
MCHC RBC AUTO-ENTMCNC: 31 G/DL (ref 32–36)
MCV RBC AUTO: 94.9 FL (ref 80–96)
MONOCYTES # BLD AUTO: 0.41 X10E3/UL (ref 0–0.8)
MONOCYTES NFR BLD AUTO: 12.1 % (ref 2–6)
MPC BLD CALC-MCNC: 12.4 FL (ref 9.4–12.4)
NEUTROPHILS # BLD AUTO: 1.85 X10E3/UL (ref 1.8–7.7)
NEUTROPHILS NFR BLD AUTO: 54.7 % (ref 53–65)
NRBC # BLD AUTO: 0 X10E3/UL (ref 0–0)
NRBC, AUTO (.00): 0 /100 (ref 0–0)
PLATELET # BLD AUTO: 141 X10E3/UL (ref 150–400)
POTASSIUM SERPL-SCNC: 4 MMOL/L (ref 3.5–5.1)
RBC # BLD AUTO: 3.94 X10E6/UL (ref 4.6–6.2)
SODIUM SERPL-SCNC: 142 MMOL/L (ref 136–145)
T4 SERPL-MCNC: 9.3 UG/DL (ref 4.5–12.1)
TSH SERPL DL<=0.005 MIU/L-ACNC: 1.17 UIU/ML (ref 0.36–3.74)
WBC # BLD AUTO: 3.38 X10E3/UL (ref 4.5–11)

## 2021-03-20 PROCEDURE — 36415 COLL VENOUS BLD VENIPUNCTURE: CPT

## 2021-03-20 PROCEDURE — 84436 ASSAY OF TOTAL THYROXINE: CPT

## 2021-03-20 PROCEDURE — G0378 HOSPITAL OBSERVATION PER HR: HCPCS

## 2021-03-20 PROCEDURE — 93010 ELECTROCARDIOGRAM REPORT: CPT | Mod: ,,, | Performed by: HOSPITALIST

## 2021-03-20 PROCEDURE — 80048 BASIC METABOLIC PNL TOTAL CA: CPT

## 2021-03-20 PROCEDURE — 63600175 PHARM REV CODE 636 W HCPCS: Performed by: NURSE PRACTITIONER

## 2021-03-20 PROCEDURE — 94640 AIRWAY INHALATION TREATMENT: CPT | Mod: XB

## 2021-03-20 PROCEDURE — 85025 COMPLETE CBC W/AUTO DIFF WBC: CPT

## 2021-03-20 PROCEDURE — 25000003 PHARM REV CODE 250: Performed by: NURSE PRACTITIONER

## 2021-03-20 PROCEDURE — 94761 N-INVAS EAR/PLS OXIMETRY MLT: CPT

## 2021-03-20 PROCEDURE — 93010 EKG 12-LEAD: ICD-10-PCS | Mod: ,,, | Performed by: HOSPITALIST

## 2021-03-20 PROCEDURE — 90935 HEMODIALYSIS ONE EVALUATION: CPT

## 2021-03-20 PROCEDURE — 25000242 PHARM REV CODE 250 ALT 637 W/ HCPCS: Performed by: NURSE PRACTITIONER

## 2021-03-20 PROCEDURE — 84443 ASSAY THYROID STIM HORMONE: CPT

## 2021-03-20 PROCEDURE — 96372 THER/PROPH/DIAG INJ SC/IM: CPT | Mod: 59

## 2021-03-20 PROCEDURE — 83735 ASSAY OF MAGNESIUM: CPT

## 2021-03-20 PROCEDURE — 93005 ELECTROCARDIOGRAM TRACING: CPT

## 2021-03-20 RX ORDER — AMLODIPINE BESYLATE 5 MG/1
5 TABLET ORAL DAILY
Status: DISCONTINUED | OUTPATIENT
Start: 2021-03-20 | End: 2021-03-21 | Stop reason: HOSPADM

## 2021-03-20 RX ADMIN — IPRATROPIUM BROMIDE AND ALBUTEROL SULFATE 3 ML: .5; 3 SOLUTION RESPIRATORY (INHALATION) at 03:03

## 2021-03-20 RX ADMIN — IPRATROPIUM BROMIDE AND ALBUTEROL SULFATE 3 ML: .5; 3 SOLUTION RESPIRATORY (INHALATION) at 12:03

## 2021-03-20 RX ADMIN — HEPARIN SODIUM 5000 UNITS: 5000 INJECTION INTRAVENOUS; SUBCUTANEOUS at 09:03

## 2021-03-20 RX ADMIN — PANTOPRAZOLE SODIUM 40 MG: 40 TABLET, DELAYED RELEASE ORAL at 09:03

## 2021-03-20 RX ADMIN — IPRATROPIUM BROMIDE AND ALBUTEROL SULFATE 3 ML: .5; 3 SOLUTION RESPIRATORY (INHALATION) at 11:03

## 2021-03-20 RX ADMIN — HYDRALAZINE HYDROCHLORIDE 100 MG: 100 TABLET, FILM COATED ORAL at 09:03

## 2021-03-20 RX ADMIN — ASPIRIN 81 MG: 81 TABLET, COATED ORAL at 09:03

## 2021-03-20 RX ADMIN — CARVEDILOL 25 MG: 25 TABLET, FILM COATED ORAL at 09:03

## 2021-03-20 RX ADMIN — ALLOPURINOL 100 MG: 100 TABLET ORAL at 09:03

## 2021-03-20 RX ADMIN — IPRATROPIUM BROMIDE AND ALBUTEROL SULFATE 3 ML: .5; 3 SOLUTION RESPIRATORY (INHALATION) at 07:03

## 2021-03-21 LAB
AV INDEX (PROSTH): 0.81
AV MEAN GRADIENT: 4 MMHG
AV VALVE AREA: 2.54 CM2
BSA FOR ECHO PROCEDURE: 1.88 M2
CV ECHO LV RWT: 0.7 CM
DOP CALC AO VTI: 24 CM
DOP CALC LVOT AREA: 3.1 CM2
DOP CALC LVOT DIAMETER: 2 CM
DOP CALC LVOT STROKE VOLUME: 60.92 CM3
DOP CALCLVOT PEAK VEL VTI: 19.4 CM
ECHO LV POSTERIOR WALL: 1.5 CM (ref 0.6–1.1)
FRACTIONAL SHORTENING: 49 % (ref 28–44)
INTERVENTRICULAR SEPTUM: 1.5 CM (ref 0.6–1.1)
IVC DIAMETER: 1.6 CM
LEFT ATRIUM SIZE: 3.9 CM
LEFT INTERNAL DIMENSION IN SYSTOLE: 2.2 CM (ref 2.1–4)
LEFT VENTRICLE MASS INDEX: 137 G/M2
LEFT VENTRICULAR INTERNAL DIMENSION IN DIASTOLE: 4.3 CM (ref 3.5–6)
LEFT VENTRICULAR MASS: 258.11 G
PISA TR MAX VEL: 4.1 M/S
RA PRESSURE: 3 MMHG
TR MAX PG: 67 MMHG
TV REST PULMONARY ARTERY PRESSURE: 70 MMHG

## 2021-03-30 ENCOUNTER — OFFICE VISIT (OUTPATIENT)
Dept: SURGERY | Facility: CLINIC | Age: 71
End: 2021-03-30
Payer: MEDICARE

## 2021-03-30 VITALS — BODY MASS INDEX: 21.84 KG/M2 | WEIGHT: 156 LBS | OXYGEN SATURATION: 98 % | HEIGHT: 71 IN | HEART RATE: 79 BPM

## 2021-03-30 DIAGNOSIS — N18.6 ESRD (END STAGE RENAL DISEASE): Chronic | ICD-10-CM

## 2021-03-30 DIAGNOSIS — N18.5 CHRONIC RENAL FAILURE SYNDROME, STAGE 5: Primary | ICD-10-CM

## 2021-03-30 PROCEDURE — 99214 OFFICE O/P EST MOD 30 MIN: CPT | Mod: PBBFAC | Performed by: SURGERY

## 2021-03-30 PROCEDURE — 99214 OFFICE O/P EST MOD 30 MIN: CPT | Mod: S$PBB,,, | Performed by: SURGERY

## 2021-03-30 PROCEDURE — 99214 PR OFFICE/OUTPT VISIT, EST, LEVL IV, 30-39 MIN: ICD-10-PCS | Mod: S$PBB,,, | Performed by: SURGERY

## 2021-03-30 PROCEDURE — 99999 PR PBB SHADOW E&M-EST. PATIENT-LVL IV: ICD-10-PCS | Mod: PBBFAC,,, | Performed by: SURGERY

## 2021-03-30 PROCEDURE — 99999 PR PBB SHADOW E&M-EST. PATIENT-LVL IV: CPT | Mod: PBBFAC,,, | Performed by: SURGERY

## 2021-04-10 ENCOUNTER — HOSPITAL ENCOUNTER (EMERGENCY)
Facility: HOSPITAL | Age: 71
Discharge: HOME | End: 2021-04-11
Attending: EMERGENCY MEDICINE
Payer: MEDICARE

## 2021-04-10 DIAGNOSIS — N18.6 ESRD (END STAGE RENAL DISEASE): Chronic | ICD-10-CM

## 2021-04-10 DIAGNOSIS — I25.10 CAD (CORONARY ARTERY DISEASE): ICD-10-CM

## 2021-04-10 DIAGNOSIS — I50.9 CONGESTIVE HEART FAILURE: ICD-10-CM

## 2021-04-10 DIAGNOSIS — I10 HYPERTENSION: Chronic | ICD-10-CM

## 2021-04-10 DIAGNOSIS — R06.02 SHORTNESS OF BREATH: Primary | ICD-10-CM

## 2021-04-10 PROCEDURE — 99284 PR EMERGENCY DEPT VISIT,LEVEL IV: ICD-10-PCS | Mod: ,,, | Performed by: NURSE PRACTITIONER

## 2021-04-10 PROCEDURE — 80048 BASIC METABOLIC PNL TOTAL CA: CPT

## 2021-04-10 PROCEDURE — 83880 ASSAY OF NATRIURETIC PEPTIDE: CPT

## 2021-04-10 PROCEDURE — 83735 ASSAY OF MAGNESIUM: CPT

## 2021-04-10 PROCEDURE — 84484 ASSAY OF TROPONIN QUANT: CPT

## 2021-04-10 PROCEDURE — 99285 EMERGENCY DEPT VISIT HI MDM: CPT | Mod: 25

## 2021-04-10 PROCEDURE — 93010 ELECTROCARDIOGRAM REPORT: CPT | Mod: ,,, | Performed by: INTERNAL MEDICINE

## 2021-04-10 PROCEDURE — 93005 ELECTROCARDIOGRAM TRACING: CPT

## 2021-04-10 PROCEDURE — 99284 EMERGENCY DEPT VISIT MOD MDM: CPT | Mod: ,,, | Performed by: NURSE PRACTITIONER

## 2021-04-10 PROCEDURE — 83874 ASSAY OF MYOGLOBIN: CPT

## 2021-04-10 PROCEDURE — 93010 EKG 12-LEAD: ICD-10-PCS | Mod: ,,, | Performed by: INTERNAL MEDICINE

## 2021-04-10 PROCEDURE — 82550 ASSAY OF CK (CPK): CPT

## 2021-04-10 PROCEDURE — 36415 COLL VENOUS BLD VENIPUNCTURE: CPT

## 2021-04-11 VITALS
WEIGHT: 161 LBS | HEART RATE: 93 BPM | OXYGEN SATURATION: 95 % | SYSTOLIC BLOOD PRESSURE: 156 MMHG | HEIGHT: 71 IN | BODY MASS INDEX: 22.54 KG/M2 | RESPIRATION RATE: 13 BRPM | DIASTOLIC BLOOD PRESSURE: 90 MMHG | TEMPERATURE: 98 F

## 2021-04-11 LAB
ANION GAP SERPL CALCULATED.3IONS-SCNC: 14 MMOL/L
BUN SERPL-MCNC: 32 MG/DL (ref 7–18)
CALCIUM SERPL-MCNC: 8.5 MG/DL (ref 8.5–10.1)
CHLORIDE SERPL-SCNC: 105 MMOL/L (ref 98–107)
CK SERPL-CCNC: 276 U/L (ref 39–308)
CO2 SERPL-SCNC: 32 MMOL/L (ref 21–32)
CREAT SERPL-MCNC: 8.13 MG/DL (ref 0.7–1.3)
GLUCOSE SERPL-MCNC: 100 MG/DL (ref 74–106)
MAGNESIUM SERPL-MCNC: 2.3 MG/DL (ref 1.7–2.3)
MYOGLOBIN SERPL-MCNC: 456 NG/ML (ref 16–116)
NT-PROBNP SERPL-MCNC: ABNORMAL PG/ML (ref 1–125)
POTASSIUM SERPL-SCNC: 4.2 MMOL/L (ref 3.5–5.1)
SODIUM SERPL-SCNC: 147 MMOL/L (ref 136–145)
TROPONIN I SERPL-MCNC: 0.08 NG/ML (ref 0–0.06)

## 2021-04-13 ENCOUNTER — HOSPITAL ENCOUNTER (OUTPATIENT)
Dept: RADIOLOGY | Facility: HOSPITAL | Age: 71
Discharge: HOME | End: 2021-04-13
Attending: SURGERY
Payer: MEDICARE

## 2021-04-13 ENCOUNTER — OFFICE VISIT (OUTPATIENT)
Dept: SURGERY | Facility: CLINIC | Age: 71
End: 2021-04-13
Payer: MEDICARE

## 2021-04-13 DIAGNOSIS — Z11.59 SCREENING FOR VIRAL DISEASE: ICD-10-CM

## 2021-04-13 DIAGNOSIS — N18.4 STAGE 4 CHRONIC KIDNEY DISEASE: Primary | ICD-10-CM

## 2021-04-13 DIAGNOSIS — N18.5 CHRONIC RENAL FAILURE SYNDROME, STAGE 5: ICD-10-CM

## 2021-04-13 PROCEDURE — 99999 PR PBB SHADOW E&M-EST. PATIENT-LVL III: CPT | Mod: PBBFAC,,, | Performed by: SURGERY

## 2021-04-13 PROCEDURE — 99213 OFFICE O/P EST LOW 20 MIN: CPT | Mod: S$PBB,,, | Performed by: SURGERY

## 2021-04-13 PROCEDURE — 99999 PR PBB SHADOW E&M-EST. PATIENT-LVL III: ICD-10-PCS | Mod: PBBFAC,,, | Performed by: SURGERY

## 2021-04-13 PROCEDURE — 99213 OFFICE O/P EST LOW 20 MIN: CPT | Mod: PBBFAC | Performed by: SURGERY

## 2021-04-13 PROCEDURE — 99213 PR OFFICE/OUTPT VISIT, EST, LEVL III, 20-29 MIN: ICD-10-PCS | Mod: S$PBB,,, | Performed by: SURGERY

## 2021-04-13 PROCEDURE — 93985 US VEIN MAPPING, HEMODIALYSIS, BILATERAL: ICD-10-PCS | Mod: 26,,, | Performed by: SURGERY

## 2021-04-13 PROCEDURE — 93985 DUP-SCAN HEMO COMPL BI STD: CPT | Mod: TC,GZ

## 2021-04-13 PROCEDURE — 93985 DUP-SCAN HEMO COMPL BI STD: CPT | Mod: 26,,, | Performed by: SURGERY

## 2021-04-14 RX ORDER — HYDROCODONE BITARTRATE AND ACETAMINOPHEN 10; 325 MG/1; MG/1
TABLET ORAL
COMMUNITY
Start: 2021-04-06

## 2021-04-14 RX ORDER — SEVELAMER CARBONATE 800 MG/1
TABLET, FILM COATED ORAL
COMMUNITY
Start: 2021-02-23

## 2021-04-20 ENCOUNTER — CLINICAL SUPPORT (OUTPATIENT)
Dept: CARDIOLOGY | Facility: CLINIC | Age: 71
End: 2021-04-20
Payer: MEDICARE

## 2021-04-20 DIAGNOSIS — N18.4 STAGE 4 CHRONIC KIDNEY DISEASE: ICD-10-CM

## 2021-04-20 PROCEDURE — 93010 ELECTROCARDIOGRAM REPORT: CPT | Mod: S$PBB,,, | Performed by: INTERNAL MEDICINE

## 2021-04-20 PROCEDURE — 99212 OFFICE O/P EST SF 10 MIN: CPT | Mod: PBBFAC,25

## 2021-04-20 PROCEDURE — 93010 EKG 12-LEAD: ICD-10-PCS | Mod: S$PBB,,, | Performed by: INTERNAL MEDICINE

## 2021-04-20 PROCEDURE — 93005 ELECTROCARDIOGRAM TRACING: CPT | Mod: PBBFAC | Performed by: INTERNAL MEDICINE

## 2021-04-22 ENCOUNTER — OFFICE VISIT (OUTPATIENT)
Dept: CARDIOLOGY | Facility: CLINIC | Age: 71
End: 2021-04-22
Payer: MEDICARE

## 2021-04-22 VITALS — DIASTOLIC BLOOD PRESSURE: 90 MMHG | SYSTOLIC BLOOD PRESSURE: 140 MMHG | RESPIRATION RATE: 18 BRPM | HEART RATE: 88 BPM

## 2021-04-22 DIAGNOSIS — I25.10 CORONARY ARTERY DISEASE INVOLVING NATIVE CORONARY ARTERY WITHOUT ANGINA PECTORIS, UNSPECIFIED WHETHER NATIVE OR TRANSPLANTED HEART: ICD-10-CM

## 2021-04-22 PROCEDURE — 99214 OFFICE O/P EST MOD 30 MIN: CPT | Mod: S$PBB,,, | Performed by: STUDENT IN AN ORGANIZED HEALTH CARE EDUCATION/TRAINING PROGRAM

## 2021-04-22 PROCEDURE — 99214 PR OFFICE/OUTPT VISIT, EST, LEVL IV, 30-39 MIN: ICD-10-PCS | Mod: S$PBB,,, | Performed by: STUDENT IN AN ORGANIZED HEALTH CARE EDUCATION/TRAINING PROGRAM

## 2021-04-22 PROCEDURE — 99999 PR PBB SHADOW E&M-EST. PATIENT-LVL IV: ICD-10-PCS | Mod: PBBFAC,,, | Performed by: STUDENT IN AN ORGANIZED HEALTH CARE EDUCATION/TRAINING PROGRAM

## 2021-04-22 PROCEDURE — 99999 PR PBB SHADOW E&M-EST. PATIENT-LVL IV: CPT | Mod: PBBFAC,,, | Performed by: STUDENT IN AN ORGANIZED HEALTH CARE EDUCATION/TRAINING PROGRAM

## 2021-04-22 PROCEDURE — 99214 OFFICE O/P EST MOD 30 MIN: CPT | Mod: PBBFAC | Performed by: STUDENT IN AN ORGANIZED HEALTH CARE EDUCATION/TRAINING PROGRAM

## 2021-04-22 RX ORDER — CLONIDINE HYDROCHLORIDE 0.1 MG/1
0.1 TABLET ORAL
COMMUNITY
Start: 2020-12-21 | End: 2021-12-20

## 2021-04-26 DIAGNOSIS — N18.5 CHRONIC RENAL FAILURE SYNDROME, STAGE 5: Primary | ICD-10-CM

## 2021-04-26 DIAGNOSIS — Z01.812 ENCOUNTER FOR PREPROCEDURAL LABORATORY EXAMINATION: ICD-10-CM

## 2021-06-15 ENCOUNTER — OFFICE VISIT (OUTPATIENT)
Dept: SURGERY | Facility: CLINIC | Age: 71
End: 2021-06-15
Attending: SURGERY
Payer: MEDICARE

## 2021-06-15 DIAGNOSIS — Z09 POSTOP CHECK: Primary | ICD-10-CM

## 2021-06-15 PROCEDURE — 99024 PR POST-OP FOLLOW-UP VISIT: ICD-10-PCS | Mod: ,,, | Performed by: SURGERY

## 2021-06-15 PROCEDURE — 99024 POSTOP FOLLOW-UP VISIT: CPT | Mod: ,,, | Performed by: SURGERY

## 2021-06-15 PROCEDURE — 99213 OFFICE O/P EST LOW 20 MIN: CPT | Mod: PBBFAC | Performed by: SURGERY

## 2021-07-22 ENCOUNTER — OFFICE VISIT (OUTPATIENT)
Dept: CARDIOLOGY | Facility: CLINIC | Age: 71
End: 2021-07-22
Payer: MEDICARE

## 2021-07-22 VITALS
BODY MASS INDEX: 23.52 KG/M2 | SYSTOLIC BLOOD PRESSURE: 120 MMHG | DIASTOLIC BLOOD PRESSURE: 80 MMHG | WEIGHT: 168 LBS | HEART RATE: 82 BPM | HEIGHT: 71 IN | RESPIRATION RATE: 18 BRPM

## 2021-07-22 DIAGNOSIS — J44.9 CHRONIC OBSTRUCTIVE PULMONARY DISEASE, UNSPECIFIED COPD TYPE: ICD-10-CM

## 2021-07-22 DIAGNOSIS — I25.10 CORONARY ARTERY DISEASE INVOLVING NATIVE CORONARY ARTERY OF NATIVE HEART WITHOUT ANGINA PECTORIS: ICD-10-CM

## 2021-07-22 PROCEDURE — 99214 OFFICE O/P EST MOD 30 MIN: CPT | Mod: S$PBB,,, | Performed by: STUDENT IN AN ORGANIZED HEALTH CARE EDUCATION/TRAINING PROGRAM

## 2021-07-22 PROCEDURE — 99214 PR OFFICE/OUTPT VISIT, EST, LEVL IV, 30-39 MIN: ICD-10-PCS | Mod: S$PBB,,, | Performed by: STUDENT IN AN ORGANIZED HEALTH CARE EDUCATION/TRAINING PROGRAM

## 2021-07-22 PROCEDURE — 99214 OFFICE O/P EST MOD 30 MIN: CPT | Mod: PBBFAC | Performed by: STUDENT IN AN ORGANIZED HEALTH CARE EDUCATION/TRAINING PROGRAM

## 2021-07-22 RX ORDER — AMLODIPINE BESYLATE 5 MG/1
5 TABLET ORAL DAILY
COMMUNITY
Start: 2021-06-23

## 2021-07-22 RX ORDER — ALLOPURINOL 100 MG/1
TABLET ORAL
COMMUNITY
Start: 2020-06-25

## 2021-07-22 RX ORDER — CARVEDILOL 12.5 MG/1
12.5 TABLET ORAL 2 TIMES DAILY
COMMUNITY
Start: 2021-06-23

## 2021-07-22 RX ORDER — CLOPIDOGREL BISULFATE 75 MG/1
75 TABLET ORAL DAILY
Qty: 30 TABLET | Refills: 11 | Status: SHIPPED | OUTPATIENT
Start: 2021-07-22 | End: 2022-07-22

## 2021-07-27 ENCOUNTER — OFFICE VISIT (OUTPATIENT)
Dept: SURGERY | Facility: CLINIC | Age: 71
End: 2021-07-27
Attending: SURGERY
Payer: MEDICARE

## 2021-07-27 DIAGNOSIS — N18.6 ESRD (END STAGE RENAL DISEASE): Primary | ICD-10-CM

## 2021-07-27 PROCEDURE — 99024 PR POST-OP FOLLOW-UP VISIT: ICD-10-PCS | Mod: S$PBB,,, | Performed by: SURGERY

## 2021-07-27 PROCEDURE — 99213 OFFICE O/P EST LOW 20 MIN: CPT | Mod: PBBFAC | Performed by: SURGERY

## 2021-07-27 PROCEDURE — 99024 POSTOP FOLLOW-UP VISIT: CPT | Mod: S$PBB,,, | Performed by: SURGERY

## 2021-07-29 ENCOUNTER — CLINICAL SUPPORT (OUTPATIENT)
Dept: PULMONOLOGY | Facility: HOSPITAL | Age: 71
End: 2021-07-29
Attending: STUDENT IN AN ORGANIZED HEALTH CARE EDUCATION/TRAINING PROGRAM
Payer: MEDICARE

## 2021-07-29 VITALS — OXYGEN SATURATION: 97 %

## 2021-07-29 DIAGNOSIS — J44.9 CHRONIC OBSTRUCTIVE PULMONARY DISEASE, UNSPECIFIED COPD TYPE: ICD-10-CM

## 2021-07-29 PROCEDURE — 94726 PLETHYSMOGRAPHY LUNG VOLUMES: CPT

## 2021-07-29 PROCEDURE — 94729 DIFFUSING CAPACITY: CPT

## 2021-07-29 PROCEDURE — 94060 EVALUATION OF WHEEZING: CPT

## 2021-08-17 ENCOUNTER — OFFICE VISIT (OUTPATIENT)
Dept: CARDIOLOGY | Facility: CLINIC | Age: 71
End: 2021-08-17
Payer: MEDICARE

## 2021-08-17 VITALS
HEIGHT: 70 IN | RESPIRATION RATE: 16 BRPM | BODY MASS INDEX: 25.62 KG/M2 | SYSTOLIC BLOOD PRESSURE: 140 MMHG | HEART RATE: 78 BPM | DIASTOLIC BLOOD PRESSURE: 80 MMHG | WEIGHT: 179 LBS

## 2021-08-17 DIAGNOSIS — J44.9 CHRONIC OBSTRUCTIVE PULMONARY DISEASE, UNSPECIFIED COPD TYPE: Primary | ICD-10-CM

## 2021-08-17 DIAGNOSIS — I25.10 CORONARY ARTERY DISEASE INVOLVING NATIVE CORONARY ARTERY OF NATIVE HEART WITHOUT ANGINA PECTORIS: ICD-10-CM

## 2021-08-17 PROCEDURE — 99214 OFFICE O/P EST MOD 30 MIN: CPT | Mod: PBBFAC | Performed by: STUDENT IN AN ORGANIZED HEALTH CARE EDUCATION/TRAINING PROGRAM

## 2021-08-17 PROCEDURE — 99214 OFFICE O/P EST MOD 30 MIN: CPT | Mod: S$PBB,,, | Performed by: STUDENT IN AN ORGANIZED HEALTH CARE EDUCATION/TRAINING PROGRAM

## 2021-08-17 PROCEDURE — 99214 PR OFFICE/OUTPT VISIT, EST, LEVL IV, 30-39 MIN: ICD-10-PCS | Mod: S$PBB,,, | Performed by: STUDENT IN AN ORGANIZED HEALTH CARE EDUCATION/TRAINING PROGRAM

## 2021-08-17 RX ORDER — TIOTROPIUM BROMIDE 18 UG/1
18 CAPSULE ORAL; RESPIRATORY (INHALATION) DAILY
Qty: 30 CAPSULE | Refills: 2 | Status: SHIPPED | OUTPATIENT
Start: 2021-08-17 | End: 2021-09-02

## 2021-08-17 RX ORDER — ALBUTEROL SULFATE 90 UG/1
2 AEROSOL, METERED RESPIRATORY (INHALATION) EVERY 6 HOURS PRN
Qty: 6.7 G | Refills: 1 | Status: SHIPPED | OUTPATIENT
Start: 2021-08-17

## 2021-09-02 ENCOUNTER — OFFICE VISIT (OUTPATIENT)
Dept: PULMONOLOGY | Facility: CLINIC | Age: 71
End: 2021-09-02
Payer: MEDICARE

## 2021-09-02 VITALS
DIASTOLIC BLOOD PRESSURE: 80 MMHG | OXYGEN SATURATION: 95 % | HEIGHT: 70 IN | RESPIRATION RATE: 16 BRPM | WEIGHT: 170 LBS | HEART RATE: 81 BPM | BODY MASS INDEX: 24.34 KG/M2 | SYSTOLIC BLOOD PRESSURE: 140 MMHG

## 2021-09-02 DIAGNOSIS — J44.9 CHRONIC OBSTRUCTIVE PULMONARY DISEASE, UNSPECIFIED COPD TYPE: Primary | ICD-10-CM

## 2021-09-02 PROCEDURE — 99214 OFFICE O/P EST MOD 30 MIN: CPT | Mod: S$PBB,,, | Performed by: INTERNAL MEDICINE

## 2021-09-02 PROCEDURE — 99214 PR OFFICE/OUTPT VISIT, EST, LEVL IV, 30-39 MIN: ICD-10-PCS | Mod: S$PBB,,, | Performed by: INTERNAL MEDICINE

## 2021-09-02 PROCEDURE — 99215 OFFICE O/P EST HI 40 MIN: CPT | Mod: PBBFAC | Performed by: INTERNAL MEDICINE

## 2021-09-02 RX ORDER — TIOTROPIUM BROMIDE INHALATION SPRAY 3.12 UG/1
2 SPRAY, METERED RESPIRATORY (INHALATION) DAILY
Qty: 4 G | Refills: 5 | Status: SHIPPED | OUTPATIENT
Start: 2021-09-02

## 2021-09-21 ENCOUNTER — OFFICE VISIT (OUTPATIENT)
Dept: SURGERY | Facility: CLINIC | Age: 71
End: 2021-09-21
Attending: SURGERY
Payer: MEDICARE

## 2021-09-21 DIAGNOSIS — N18.6 ESRD (END STAGE RENAL DISEASE): Primary | Chronic | ICD-10-CM

## 2021-09-21 PROCEDURE — 99213 OFFICE O/P EST LOW 20 MIN: CPT | Mod: PBBFAC | Performed by: SURGERY

## 2021-09-21 PROCEDURE — 99213 OFFICE O/P EST LOW 20 MIN: CPT | Mod: S$PBB,,, | Performed by: SURGERY

## 2021-09-21 PROCEDURE — 99213 PR OFFICE/OUTPT VISIT, EST, LEVL III, 20-29 MIN: ICD-10-PCS | Mod: S$PBB,,, | Performed by: SURGERY

## 2021-11-02 ENCOUNTER — OFFICE VISIT (OUTPATIENT)
Dept: SURGERY | Facility: CLINIC | Age: 71
End: 2021-11-02
Attending: SURGERY
Payer: MEDICARE

## 2021-11-02 DIAGNOSIS — T82.868D THROMBOSIS OF ARTERIOVENOUS DIALYSIS FISTULA, SUBSEQUENT ENCOUNTER: ICD-10-CM

## 2021-11-02 DIAGNOSIS — I77.0 AV (ARTERIOVENOUS FISTULA): Primary | ICD-10-CM

## 2021-11-02 PROCEDURE — 99213 OFFICE O/P EST LOW 20 MIN: CPT | Mod: S$PBB,,, | Performed by: SURGERY

## 2021-11-02 PROCEDURE — 99213 PR OFFICE/OUTPT VISIT, EST, LEVL III, 20-29 MIN: ICD-10-PCS | Mod: S$PBB,,, | Performed by: SURGERY

## 2021-11-02 PROCEDURE — 99214 OFFICE O/P EST MOD 30 MIN: CPT | Mod: PBBFAC | Performed by: SURGERY

## 2021-11-03 ENCOUNTER — TELEPHONE (OUTPATIENT)
Dept: SURGERY | Facility: CLINIC | Age: 71
End: 2021-11-03
Payer: MEDICARE

## 2021-11-04 ENCOUNTER — TELEPHONE (OUTPATIENT)
Dept: PULMONOLOGY | Facility: CLINIC | Age: 71
End: 2021-11-04
Payer: MEDICARE

## 2021-11-04 ENCOUNTER — HOSPITAL ENCOUNTER (OUTPATIENT)
Dept: RADIOLOGY | Facility: HOSPITAL | Age: 71
Discharge: HOME | End: 2021-11-04
Attending: INTERNAL MEDICINE
Payer: MEDICARE

## 2021-11-04 DIAGNOSIS — J44.9 CHRONIC OBSTRUCTIVE PULMONARY DISEASE, UNSPECIFIED COPD TYPE: ICD-10-CM

## 2021-11-04 PROBLEM — I77.0 AV (ARTERIOVENOUS FISTULA): Status: ACTIVE | Noted: 2021-11-04

## 2021-11-04 PROCEDURE — 71046 XR CHEST PA AND LATERAL: ICD-10-PCS | Mod: 26,,, | Performed by: RADIOLOGY

## 2021-11-04 PROCEDURE — 71046 X-RAY EXAM CHEST 2 VIEWS: CPT | Mod: 26,,, | Performed by: RADIOLOGY

## 2021-11-04 PROCEDURE — 71046 X-RAY EXAM CHEST 2 VIEWS: CPT | Mod: TC

## 2021-11-09 ENCOUNTER — OFFICE VISIT (OUTPATIENT)
Dept: SURGERY | Facility: CLINIC | Age: 71
End: 2021-11-09
Attending: SURGERY
Payer: MEDICARE

## 2021-11-09 ENCOUNTER — HOSPITAL ENCOUNTER (OUTPATIENT)
Dept: RADIOLOGY | Facility: HOSPITAL | Age: 71
Discharge: HOME | End: 2021-11-09
Attending: SURGERY
Payer: MEDICARE

## 2021-11-09 DIAGNOSIS — I77.0 AV (ARTERIOVENOUS FISTULA): ICD-10-CM

## 2021-11-09 DIAGNOSIS — I77.0 AV (ARTERIOVENOUS FISTULA): Primary | ICD-10-CM

## 2021-11-09 PROCEDURE — 93990 US HEMODIALYSIS ACCESS: ICD-10-PCS | Mod: 26,,, | Performed by: SURGERY

## 2021-11-09 PROCEDURE — 99213 OFFICE O/P EST LOW 20 MIN: CPT | Mod: PBBFAC | Performed by: SURGERY

## 2021-11-09 PROCEDURE — 99214 PR OFFICE/OUTPT VISIT, EST, LEVL IV, 30-39 MIN: ICD-10-PCS | Mod: S$PBB,,, | Performed by: SURGERY

## 2021-11-09 PROCEDURE — 93990 DOPPLER FLOW TESTING: CPT | Mod: 26,,, | Performed by: SURGERY

## 2021-11-09 PROCEDURE — 99214 OFFICE O/P EST MOD 30 MIN: CPT | Mod: S$PBB,,, | Performed by: SURGERY

## 2021-11-09 PROCEDURE — 93990 DOPPLER FLOW TESTING: CPT | Mod: TC

## 2021-12-07 DIAGNOSIS — I10 HYPERTENSION, UNSPECIFIED TYPE: Primary | ICD-10-CM

## 2022-02-08 ENCOUNTER — TELEPHONE (OUTPATIENT)
Dept: INTERNAL MEDICINE | Facility: CLINIC | Age: 72
End: 2022-02-08
Payer: MEDICARE

## 2022-02-08 NOTE — TELEPHONE ENCOUNTER
Per messaged request from   Refill for Albuterol HFA at current dose Disp#1, RF 5  Phoned to [facility] pharmacist  Per VO Mary Ellen/gp    Message was also recorderd to pt's voice mail that noted med rf was called to [facility] as requested.    Also,  was asked to schedule a follow-up appt with Mary Ellen sometime in the near future.  (He was seen fro COPD as consult frm  9/2/21:  Follow-up Appointment with CXR was ordered and scheduled for 11/4/21:   had CXR but did not keep clinic appt that day.    Clinic 's number was included in voice mail message.//gp

## 2022-04-12 ENCOUNTER — TELEPHONE (OUTPATIENT)
Dept: PULMONOLOGY | Facility: CLINIC | Age: 72
End: 2022-04-12
Payer: MEDICARE

## 2022-04-12 NOTE — TELEPHONE ENCOUNTER
"Fax received and request confirmed with a phone call.    [facility] records requested "Pulmonary notes" for [facility]: reproted MD there was awaiting notes.    's 9/2/21 clinic note, 7/29/21 PFT report and CXR reports (11/4/21 and 4/12/21)  Fax'd [phone].    Note was incluided that pt did not follow-up  With pulmonary after 9/2/21 appt.  Notes sent per VO /gp  "

## 2022-04-26 ENCOUNTER — OFFICE VISIT (OUTPATIENT)
Dept: SURGERY | Facility: CLINIC | Age: 72
End: 2022-04-26
Attending: SURGERY
Payer: MEDICARE

## 2022-04-26 VITALS — HEIGHT: 71 IN | BODY MASS INDEX: 24.92 KG/M2 | WEIGHT: 178 LBS

## 2022-04-26 DIAGNOSIS — I77.0 AV (ARTERIOVENOUS FISTULA): ICD-10-CM

## 2022-04-26 DIAGNOSIS — N18.5 CHRONIC RENAL FAILURE SYNDROME, STAGE 5: Primary | ICD-10-CM

## 2022-04-26 PROCEDURE — 99214 OFFICE O/P EST MOD 30 MIN: CPT | Mod: PBBFAC | Performed by: SURGERY

## 2022-04-26 PROCEDURE — 99212 PR OFFICE/OUTPT VISIT, EST, LEVL II, 10-19 MIN: ICD-10-PCS | Mod: S$PBB,,, | Performed by: SURGERY

## 2022-04-26 PROCEDURE — 99212 OFFICE O/P EST SF 10 MIN: CPT | Mod: S$PBB,,, | Performed by: SURGERY

## 2022-04-26 NOTE — PATIENT INSTRUCTIONS
[facility]  [address]  [place] [address]      YOUR SURGERY DATE IS 5/12/22    COVID TESTING AND EKG IS SCHEDULED FOR 5/10/22. PLEASE SIGN IN ON 1ST FLOOR OF THE  [facility] FOR LAB.  EKG IS LOCATED ON 2ND FLOOR.      DO NOT EAT OR DRINK ANYTHING AFTER MIDNIGHT BEFORE YOU SURGERY    YOU MUST QUARANTINE FROM TIME OF COVID TESTING UNTIL SURGERY.    BRING ALL MEDICATIONS YOU ARE CURRENTLY TAKING WITH YOU.  IF YOU ARE TAKING  A BLOOD PRESSURE MEDICATION, TAKE YOUR BLOOD PRESSURE MEDICATION WITH A   SIP OF WATER WHEN YOU GET UP THE MORNING OF SURGERY.     YOU MUST PRE-ADMIT AT THE FOLLOWING WEBSITE:  WEBSITE: [email or web address]  PASSWORD: [email or web address]    A STAFF MEMBER FROM THE [facility] WILL CALL YOU THE DAY BEFORE  SURGERY TO LET YOU KNOW WHAT TIME TO ARRIVE THE MORNING OF SURGERY.  IF YOU HAVE NOT HEARD FROM THEM BY 4 P.M. THE DAY BEFORE YOUR SURGERY,   PLEASE CALL THEM [phone].      IF YOU HAVE ANY QUESTIONS YOU MAY CONTACT OUR OFFICE [phone].

## 2022-04-27 NOTE — PROGRESS NOTES
"Subjective:       Patient ID: [place]nao Damon is a 71 y.o. male.    Chief Complaint: Follow-up    72 y/o male patient with a history of renal failure has a fistula in right forearm.  Dialysis reports difficulty accessing the fistula.  Patient reports recent swelling in the forearm, as well.  He is dialyzing through an HD catheter.  Patient reports that he is using home O2 and is taking Plavix.  He has been able to be dialyzed for several months with the forearm fistula, and has been sent for catheter removal.     Follow-up  Associated symptoms include fatigue. Pertinent negatives include no chest pain or congestion.       family history includes Stroke in his mother.  Past Medical History:   Diagnosis Date    Anticoagulant long-term use     CHF (congestive heart failure)     COPD (chronic obstructive pulmonary disease)     Coronary artery disease     ESRD on dialysis     Hypertension     Renal disorder       Past Surgical History:   Procedure Laterality Date    AV FISTULA PLACEMENT, BRACHIOCEPHALIC      INSERTION OF DIALYSIS CATHETER         reports that he has been smoking cigarettes. He has a 15.00 pack-year smoking history. He has never used smokeless tobacco. He reports previous alcohol use. He reports previous drug use.     Review of Systems   Constitutional: Positive for fatigue.   HENT: Negative for congestion.    Respiratory: Negative for chest tightness.    Cardiovascular: Negative for chest pain.   Endocrine: Negative for cold intolerance.   Neurological: Positive for dizziness.          Objective:      Ht 5' 11" (1.803 m)   Wt 80.7 kg (178 lb)   BMI 24.83 kg/m²    Physical Exam  Constitutional:       General: He is not in acute distress.     Appearance: He is normal weight.   HENT:      Nose: Nose normal.   Eyes:      General: No scleral icterus.  Cardiovascular:      Rate and Rhythm: Normal rate and regular rhythm.      Pulses: Normal pulses.   Pulmonary:      Breath sounds: Normal breath sounds. "   Abdominal:      General: There is no distension.      Palpations: Abdomen is soft. There is no mass.      Hernia: No hernia is present.   Musculoskeletal:         General: No swelling or tenderness.   Lymphadenopathy:      Cervical: No cervical adenopathy.   Skin:     Comments: Right f/a Fistula is patent by palpation with thrill by palpation.     Neurological:      General: No focal deficit present.      Mental Status: He is alert.      Motor: No weakness.   Psychiatric:         Mood and Affect: Mood normal.           Assessment/Plan:         Chronic renal failure syndrome, stage 5  -     POCT COVID-19 Rapid Screening; Future; Expected date: 05/10/2022  -     EKG 12-lead; Future; Expected date: 05/03/2022  -     Ambulatory Referral to External Surgery    AV (arteriovenous fistula)         Problem List Items Addressed This Visit        Cardiac/Vascular    AV (arteriovenous fistula)    Overview     Right forearm fistula working            Current Assessment & Plan     Has been sent for removal of catheter  Will plan to do with sedation, in ER soon             Other Visit Diagnoses     Chronic renal failure syndrome, stage 5    -  Primary    Relevant Orders    POCT COVID-19 Rapid Screening    EKG 12-lead    Ambulatory Referral to External Surgery

## 2022-05-10 ENCOUNTER — CLINICAL SUPPORT (OUTPATIENT)
Dept: CARDIOLOGY | Facility: CLINIC | Age: 72
End: 2022-05-10
Attending: SURGERY
Payer: MEDICARE

## 2022-05-10 DIAGNOSIS — N18.5 CHRONIC RENAL FAILURE SYNDROME, STAGE 5: ICD-10-CM

## 2022-05-10 PROCEDURE — 93010 ELECTROCARDIOGRAM REPORT: CPT | Mod: S$PBB,,, | Performed by: STUDENT IN AN ORGANIZED HEALTH CARE EDUCATION/TRAINING PROGRAM

## 2022-05-10 PROCEDURE — 93010 EKG 12-LEAD: ICD-10-PCS | Mod: S$PBB,,, | Performed by: STUDENT IN AN ORGANIZED HEALTH CARE EDUCATION/TRAINING PROGRAM

## 2022-05-10 PROCEDURE — 93005 ELECTROCARDIOGRAM TRACING: CPT | Mod: PBBFAC | Performed by: STUDENT IN AN ORGANIZED HEALTH CARE EDUCATION/TRAINING PROGRAM

## 2022-05-10 PROCEDURE — 99211 OFF/OP EST MAY X REQ PHY/QHP: CPT | Mod: PBBFAC

## 2022-05-12 ENCOUNTER — OUTSIDE PLACE OF SERVICE (OUTPATIENT)
Dept: ADMINISTRATIVE | Facility: HOSPITAL | Age: 72
End: 2022-05-12
Payer: MEDICARE

## 2022-05-12 PROCEDURE — 36589 PR REMOVAL TUNNELED CV CATH W/O SUBQ PORT OR PUMP: ICD-10-PCS | Mod: ,,, | Performed by: SURGERY

## 2022-05-12 PROCEDURE — 36589 REMOVAL TUNNELED CV CATH: CPT | Mod: ,,, | Performed by: SURGERY

## 2023-06-21 ENCOUNTER — OUTSIDE PLACE OF SERVICE (OUTPATIENT)
Dept: NEPHROLOGY | Facility: CLINIC | Age: 73
End: 2023-06-21
Payer: MEDICARE

## 2023-06-21 PROCEDURE — 90935 HEMODIALYSIS ONE EVALUATION: CPT | Mod: S$PBB,,, | Performed by: INTERNAL MEDICINE

## 2023-06-21 PROCEDURE — 90935 PR HEMODIALYSIS, ONE EVALUATION: ICD-10-PCS | Mod: S$PBB,,, | Performed by: INTERNAL MEDICINE

## 2023-06-23 ENCOUNTER — OUTSIDE PLACE OF SERVICE (OUTPATIENT)
Dept: NEPHROLOGY | Facility: CLINIC | Age: 73
End: 2023-06-23
Payer: MEDICARE

## 2023-06-23 PROCEDURE — 90935 HEMODIALYSIS ONE EVALUATION: CPT | Mod: S$PBB,,, | Performed by: INTERNAL MEDICINE

## 2023-06-23 PROCEDURE — 90935 PR HEMODIALYSIS, ONE EVALUATION: ICD-10-PCS | Mod: S$PBB,,, | Performed by: INTERNAL MEDICINE

## 2024-08-25 ENCOUNTER — HOSPITAL ENCOUNTER (INPATIENT)
Facility: HOSPITAL | Age: 74
LOS: 4 days | Discharge: HOME | DRG: 280 | End: 2024-08-29
Attending: EMERGENCY MEDICINE | Admitting: INTERNAL MEDICINE
Payer: MEDICARE

## 2024-08-25 DIAGNOSIS — I24.9 ACS (ACUTE CORONARY SYNDROME): Primary | ICD-10-CM

## 2024-08-25 DIAGNOSIS — R68.84 JAW PAIN: ICD-10-CM

## 2024-08-25 DIAGNOSIS — R79.89 ELEVATED TROPONIN: ICD-10-CM

## 2024-08-25 DIAGNOSIS — E87.5 HYPERKALEMIA: ICD-10-CM

## 2024-08-25 DIAGNOSIS — R93.1 ABNORMAL ECHOCARDIOGRAM: ICD-10-CM

## 2024-08-25 DIAGNOSIS — I21.4 NSTEMI (NON-ST ELEVATED MYOCARDIAL INFARCTION): ICD-10-CM

## 2024-08-25 DIAGNOSIS — B37.0 THRUSH: ICD-10-CM

## 2024-08-25 DIAGNOSIS — I50.23 ACUTE ON CHRONIC HFREF (HEART FAILURE WITH REDUCED EJECTION FRACTION): ICD-10-CM

## 2024-08-25 DIAGNOSIS — I25.10 CORONARY ARTERY DISEASE INVOLVING NATIVE CORONARY ARTERY OF NATIVE HEART WITHOUT ANGINA PECTORIS: ICD-10-CM

## 2024-08-25 DIAGNOSIS — J96.02 ACUTE RESPIRATORY FAILURE WITH HYPOXIA AND HYPERCARBIA: ICD-10-CM

## 2024-08-25 DIAGNOSIS — J96.01 ACUTE RESPIRATORY FAILURE WITH HYPOXIA AND HYPERCARBIA: ICD-10-CM

## 2024-08-25 DIAGNOSIS — I21.A1 TYPE 2 MI (MYOCARDIAL INFARCTION): ICD-10-CM

## 2024-08-25 DIAGNOSIS — D63.1 ANEMIA OF RENAL DISEASE: ICD-10-CM

## 2024-08-25 DIAGNOSIS — D69.6 THROMBOCYTOPENIA: ICD-10-CM

## 2024-08-25 DIAGNOSIS — R94.30 WALL MOTION ABNORMALITY OF INFERIOR WALL OF LEFT VENTRICLE: ICD-10-CM

## 2024-08-25 DIAGNOSIS — R06.02 SHORTNESS OF BREATH: ICD-10-CM

## 2024-08-25 DIAGNOSIS — R07.9 CHEST PAIN: ICD-10-CM

## 2024-08-25 DIAGNOSIS — N18.6 ESRD (END STAGE RENAL DISEASE): ICD-10-CM

## 2024-08-25 DIAGNOSIS — N18.9 ANEMIA OF RENAL DISEASE: ICD-10-CM

## 2024-08-25 PROBLEM — I50.20 HFREF (HEART FAILURE WITH REDUCED EJECTION FRACTION): Status: ACTIVE | Noted: 2021-03-19

## 2024-08-25 PROBLEM — I10 HYPERTENSION: Status: ACTIVE | Noted: 2021-03-19

## 2024-08-25 LAB
ACANTHOCYTES BLD QL SMEAR: ABNORMAL
ALBUMIN SERPL BCP-MCNC: 2.9 G/DL (ref 3.5–5)
ALBUMIN/GLOB SERPL: 1.2 {RATIO}
ALP SERPL-CCNC: 65 U/L (ref 45–115)
ALT SERPL W P-5'-P-CCNC: 52 U/L (ref 16–61)
ANION GAP SERPL CALCULATED.3IONS-SCNC: 12 MMOL/L (ref 7–16)
ANISOCYTOSIS BLD QL SMEAR: ABNORMAL
APTT PPP: 25.8 SECONDS (ref 25.2–37.3)
AST SERPL W P-5'-P-CCNC: 47 U/L (ref 15–37)
BASOPHILS # BLD AUTO: 0.01 K/UL (ref 0–0.2)
BASOPHILS NFR BLD AUTO: 0.2 % (ref 0–1)
BILIRUB SERPL-MCNC: 0.4 MG/DL (ref ?–1.2)
BUN SERPL-MCNC: 58 MG/DL (ref 7–18)
BUN/CREAT SERPL: 7 (ref 6–20)
CALCIUM SERPL-MCNC: 8.6 MG/DL (ref 8.5–10.1)
CHLORIDE SERPL-SCNC: 107 MMOL/L (ref 98–107)
CO2 SERPL-SCNC: 25 MMOL/L (ref 21–32)
CREAT SERPL-MCNC: 7.96 MG/DL (ref 0.7–1.3)
CRENATED CELLS: ABNORMAL
DIFFERENTIAL METHOD BLD: ABNORMAL
EGFR (NO RACE VARIABLE) (RUSH/TITUS): 7 ML/MIN/1.73M2
EOSINOPHIL # BLD AUTO: 0 K/UL (ref 0–0.5)
EOSINOPHIL NFR BLD AUTO: 0 % (ref 1–4)
ERYTHROCYTE [DISTWIDTH] IN BLOOD BY AUTOMATED COUNT: 18.6 % (ref 11.5–14.5)
GLOBULIN SER-MCNC: 2.5 G/DL (ref 2–4)
GLUCOSE SERPL-MCNC: 99 MG/DL (ref 74–106)
HCT VFR BLD AUTO: 32.9 % (ref 40–54)
HGB BLD-MCNC: 10 G/DL (ref 13.5–18)
IMM GRANULOCYTES # BLD AUTO: 0.14 K/UL (ref 0–0.04)
IMM GRANULOCYTES NFR BLD: 2.2 % (ref 0–0.4)
INR BLD: 1.06
LYMPHOCYTES # BLD AUTO: 0.21 K/UL (ref 1–4.8)
LYMPHOCYTES NFR BLD AUTO: 3.4 % (ref 27–41)
MACROCYTES BLD QL SMEAR: ABNORMAL
MAGNESIUM SERPL-MCNC: 2.8 MG/DL (ref 1.7–2.3)
MCH RBC QN AUTO: 29.5 PG (ref 27–31)
MCHC RBC AUTO-ENTMCNC: 30.4 G/DL (ref 32–36)
MCV RBC AUTO: 97.1 FL (ref 80–96)
MONOCYTES # BLD AUTO: 0.29 K/UL (ref 0–0.8)
MONOCYTES NFR BLD AUTO: 4.6 % (ref 2–6)
MPC BLD CALC-MCNC: ABNORMAL G/DL
NEUTROPHILS # BLD AUTO: 5.6 K/UL (ref 1.8–7.7)
NEUTROPHILS NFR BLD AUTO: 89.6 % (ref 53–65)
NRBC # BLD AUTO: 0 X10E3/UL
NRBC, AUTO (.00): 0 %
NT-PROBNP SERPL-MCNC: ABNORMAL PG/ML (ref 1–450)
OVALOCYTES BLD QL SMEAR: ABNORMAL
PLATELET # BLD AUTO: 78 K/UL (ref 150–400)
PLATELET MORPHOLOGY: ABNORMAL
POTASSIUM SERPL-SCNC: 5.9 MMOL/L (ref 3.5–5.1)
PROT SERPL-MCNC: 5.4 G/DL (ref 6.4–8.2)
PROTHROMBIN TIME: 13.7 SECONDS (ref 11.7–14.7)
RBC # BLD AUTO: 3.39 M/UL (ref 4.6–6.2)
SODIUM SERPL-SCNC: 138 MMOL/L (ref 136–145)
TROPONIN I SERPL DL<=0.01 NG/ML-MCNC: 553.9 PG/ML
TROPONIN I SERPL DL<=0.01 NG/ML-MCNC: 569.7 PG/ML
WBC # BLD AUTO: 6.25 K/UL (ref 4.5–11)

## 2024-08-25 PROCEDURE — 85610 PROTHROMBIN TIME: CPT | Performed by: INTERNAL MEDICINE

## 2024-08-25 PROCEDURE — 94761 N-INVAS EAR/PLS OXIMETRY MLT: CPT

## 2024-08-25 PROCEDURE — 36415 COLL VENOUS BLD VENIPUNCTURE: CPT

## 2024-08-25 PROCEDURE — 84484 ASSAY OF TROPONIN QUANT: CPT

## 2024-08-25 PROCEDURE — 25000003 PHARM REV CODE 250: Performed by: INTERNAL MEDICINE

## 2024-08-25 PROCEDURE — 36415 COLL VENOUS BLD VENIPUNCTURE: CPT | Performed by: EMERGENCY MEDICINE

## 2024-08-25 PROCEDURE — 11000001 HC ACUTE MED/SURG PRIVATE ROOM

## 2024-08-25 PROCEDURE — 27000221 HC OXYGEN, UP TO 24 HOURS

## 2024-08-25 PROCEDURE — 99900035 HC TECH TIME PER 15 MIN (STAT)

## 2024-08-25 PROCEDURE — 25000242 PHARM REV CODE 250 ALT 637 W/ HCPCS: Performed by: EMERGENCY MEDICINE

## 2024-08-25 PROCEDURE — 83880 ASSAY OF NATRIURETIC PEPTIDE: CPT | Performed by: EMERGENCY MEDICINE

## 2024-08-25 PROCEDURE — 80053 COMPREHEN METABOLIC PANEL: CPT | Performed by: EMERGENCY MEDICINE

## 2024-08-25 PROCEDURE — 63600175 PHARM REV CODE 636 W HCPCS: Performed by: INTERNAL MEDICINE

## 2024-08-25 PROCEDURE — 63600175 PHARM REV CODE 636 W HCPCS: Performed by: EMERGENCY MEDICINE

## 2024-08-25 PROCEDURE — 96372 THER/PROPH/DIAG INJ SC/IM: CPT | Performed by: EMERGENCY MEDICINE

## 2024-08-25 PROCEDURE — 25000242 PHARM REV CODE 250 ALT 637 W/ HCPCS

## 2024-08-25 PROCEDURE — 84484 ASSAY OF TROPONIN QUANT: CPT | Performed by: EMERGENCY MEDICINE

## 2024-08-25 PROCEDURE — 83735 ASSAY OF MAGNESIUM: CPT | Performed by: EMERGENCY MEDICINE

## 2024-08-25 PROCEDURE — 85730 THROMBOPLASTIN TIME PARTIAL: CPT | Performed by: INTERNAL MEDICINE

## 2024-08-25 PROCEDURE — 93005 ELECTROCARDIOGRAM TRACING: CPT

## 2024-08-25 PROCEDURE — 94640 AIRWAY INHALATION TREATMENT: CPT

## 2024-08-25 PROCEDURE — 85025 COMPLETE CBC W/AUTO DIFF WBC: CPT | Performed by: EMERGENCY MEDICINE

## 2024-08-25 PROCEDURE — 25000003 PHARM REV CODE 250

## 2024-08-25 RX ORDER — ATORVASTATIN CALCIUM 80 MG/1
80 TABLET, FILM COATED ORAL DAILY
Status: DISCONTINUED | OUTPATIENT
Start: 2024-08-25 | End: 2024-08-29 | Stop reason: HOSPADM

## 2024-08-25 RX ORDER — HEPARIN SODIUM 5000 [USP'U]/ML
5000 INJECTION, SOLUTION INTRAVENOUS; SUBCUTANEOUS EVERY 8 HOURS
Status: DISCONTINUED | OUTPATIENT
Start: 2024-08-25 | End: 2024-08-25

## 2024-08-25 RX ORDER — ASPIRIN 81 MG/1
81 TABLET ORAL DAILY
Status: DISCONTINUED | OUTPATIENT
Start: 2024-08-26 | End: 2024-08-25

## 2024-08-25 RX ORDER — MONTELUKAST SODIUM 10 MG/1
10 TABLET ORAL NIGHTLY
Status: DISCONTINUED | OUTPATIENT
Start: 2024-08-25 | End: 2024-08-29 | Stop reason: HOSPADM

## 2024-08-25 RX ORDER — BUDESONIDE 0.5 MG/2ML
0.5 INHALANT ORAL 2 TIMES DAILY
Status: DISCONTINUED | OUTPATIENT
Start: 2024-08-25 | End: 2024-08-27

## 2024-08-25 RX ORDER — HYDROCODONE BITARTRATE AND ACETAMINOPHEN 10; 325 MG/1; MG/1
1 TABLET ORAL EVERY 8 HOURS PRN
Status: DISCONTINUED | OUTPATIENT
Start: 2024-08-25 | End: 2024-08-29 | Stop reason: HOSPADM

## 2024-08-25 RX ORDER — IPRATROPIUM BROMIDE AND ALBUTEROL SULFATE 2.5; .5 MG/3ML; MG/3ML
3 SOLUTION RESPIRATORY (INHALATION) EVERY 6 HOURS PRN
COMMUNITY

## 2024-08-25 RX ORDER — ATORVASTATIN CALCIUM 40 MG/1
40 TABLET, FILM COATED ORAL DAILY
Status: DISCONTINUED | OUTPATIENT
Start: 2024-08-26 | End: 2024-08-25

## 2024-08-25 RX ORDER — IBUPROFEN 200 MG
24 TABLET ORAL
Status: DISCONTINUED | OUTPATIENT
Start: 2024-08-25 | End: 2024-08-29 | Stop reason: HOSPADM

## 2024-08-25 RX ORDER — ARFORMOTEROL TARTRATE 15 UG/2ML
15 SOLUTION RESPIRATORY (INHALATION) 2 TIMES DAILY
COMMUNITY

## 2024-08-25 RX ORDER — SODIUM CHLORIDE 0.9 % (FLUSH) 0.9 %
10 SYRINGE (ML) INJECTION EVERY 12 HOURS PRN
Status: DISCONTINUED | OUTPATIENT
Start: 2024-08-25 | End: 2024-08-29 | Stop reason: HOSPADM

## 2024-08-25 RX ORDER — IPRATROPIUM BROMIDE AND ALBUTEROL SULFATE 2.5; .5 MG/3ML; MG/3ML
3 SOLUTION RESPIRATORY (INHALATION) EVERY 6 HOURS
Status: DISCONTINUED | OUTPATIENT
Start: 2024-08-26 | End: 2024-08-29 | Stop reason: HOSPADM

## 2024-08-25 RX ORDER — GLUCAGON 1 MG
1 KIT INJECTION
Status: DISCONTINUED | OUTPATIENT
Start: 2024-08-25 | End: 2024-08-29 | Stop reason: HOSPADM

## 2024-08-25 RX ORDER — ONDANSETRON HYDROCHLORIDE 2 MG/ML
4 INJECTION, SOLUTION INTRAVENOUS EVERY 8 HOURS PRN
Status: DISCONTINUED | OUTPATIENT
Start: 2024-08-25 | End: 2024-08-27

## 2024-08-25 RX ORDER — NALOXONE HCL 0.4 MG/ML
0.02 VIAL (ML) INJECTION
Status: DISCONTINUED | OUTPATIENT
Start: 2024-08-25 | End: 2024-08-29 | Stop reason: HOSPADM

## 2024-08-25 RX ORDER — HEPARIN SODIUM,PORCINE/D5W 25000/250
0-40 INTRAVENOUS SOLUTION INTRAVENOUS CONTINUOUS
Status: DISCONTINUED | OUTPATIENT
Start: 2024-08-25 | End: 2024-08-26

## 2024-08-25 RX ORDER — ASPIRIN 81 MG/1
81 TABLET ORAL DAILY
Status: DISCONTINUED | OUTPATIENT
Start: 2024-08-25 | End: 2024-08-28

## 2024-08-25 RX ORDER — HYDRALAZINE HYDROCHLORIDE 50 MG/1
50 TABLET, FILM COATED ORAL 3 TIMES DAILY
Status: DISCONTINUED | OUTPATIENT
Start: 2024-08-25 | End: 2024-08-29 | Stop reason: HOSPADM

## 2024-08-25 RX ORDER — IPRATROPIUM BROMIDE AND ALBUTEROL SULFATE 2.5; .5 MG/3ML; MG/3ML
3 SOLUTION RESPIRATORY (INHALATION) EVERY 6 HOURS PRN
Status: DISCONTINUED | OUTPATIENT
Start: 2024-08-25 | End: 2024-08-25

## 2024-08-25 RX ORDER — LEVOFLOXACIN 5 MG/ML
750 INJECTION, SOLUTION INTRAVENOUS
Status: DISCONTINUED | OUTPATIENT
Start: 2024-08-25 | End: 2024-08-26

## 2024-08-25 RX ORDER — DEXAMETHASONE SODIUM PHOSPHATE 4 MG/ML
8 INJECTION, SOLUTION INTRA-ARTICULAR; INTRALESIONAL; INTRAMUSCULAR; INTRAVENOUS; SOFT TISSUE
Status: DISCONTINUED | OUTPATIENT
Start: 2024-08-25 | End: 2024-08-25

## 2024-08-25 RX ORDER — DEXAMETHASONE SODIUM PHOSPHATE 4 MG/ML
8 INJECTION, SOLUTION INTRA-ARTICULAR; INTRALESIONAL; INTRAMUSCULAR; INTRAVENOUS; SOFT TISSUE
Status: COMPLETED | OUTPATIENT
Start: 2024-08-25 | End: 2024-08-25

## 2024-08-25 RX ORDER — IBUPROFEN 200 MG
16 TABLET ORAL
Status: DISCONTINUED | OUTPATIENT
Start: 2024-08-25 | End: 2024-08-29 | Stop reason: HOSPADM

## 2024-08-25 RX ORDER — TALC
6 POWDER (GRAM) TOPICAL NIGHTLY PRN
Status: DISCONTINUED | OUTPATIENT
Start: 2024-08-25 | End: 2024-08-29 | Stop reason: HOSPADM

## 2024-08-25 RX ORDER — IPRATROPIUM BROMIDE AND ALBUTEROL SULFATE 2.5; .5 MG/3ML; MG/3ML
3 SOLUTION RESPIRATORY (INHALATION)
Status: COMPLETED | OUTPATIENT
Start: 2024-08-25 | End: 2024-08-25

## 2024-08-25 RX ORDER — BUDESONIDE 0.5 MG/2ML
0.5 INHALANT ORAL 2 TIMES DAILY
COMMUNITY
Start: 2024-08-23

## 2024-08-25 RX ORDER — CARVEDILOL 12.5 MG/1
12.5 TABLET ORAL 2 TIMES DAILY
Status: DISCONTINUED | OUTPATIENT
Start: 2024-08-25 | End: 2024-08-29 | Stop reason: HOSPADM

## 2024-08-25 RX ORDER — ACETAMINOPHEN 325 MG/1
650 TABLET ORAL EVERY 4 HOURS PRN
Status: DISCONTINUED | OUTPATIENT
Start: 2024-08-25 | End: 2024-08-27

## 2024-08-25 RX ORDER — OLOPATADINE HYDROCHLORIDE 1 MG/ML
1 SOLUTION/ DROPS OPHTHALMIC DAILY
COMMUNITY
Start: 2024-08-20 | End: 2024-08-27

## 2024-08-25 RX ORDER — SEVELAMER CARBONATE 800 MG/1
800 TABLET, FILM COATED ORAL
Status: DISCONTINUED | OUTPATIENT
Start: 2024-08-26 | End: 2024-08-29 | Stop reason: HOSPADM

## 2024-08-25 RX ADMIN — HEPARIN SODIUM 12 UNITS/KG/HR: 10000 INJECTION, SOLUTION INTRAVENOUS at 11:08

## 2024-08-25 RX ADMIN — Medication 6 MG: at 10:08

## 2024-08-25 RX ADMIN — ATORVASTATIN CALCIUM 80 MG: 80 TABLET, FILM COATED ORAL at 10:08

## 2024-08-25 RX ADMIN — ASPIRIN 81 MG: 81 TABLET, COATED ORAL at 09:08

## 2024-08-25 RX ADMIN — LEVOFLOXACIN 750 MG: 5 INJECTION, SOLUTION INTRAVENOUS at 10:08

## 2024-08-25 RX ADMIN — DEXAMETHASONE SODIUM PHOSPHATE 8 MG: 4 INJECTION, SOLUTION INTRA-ARTICULAR; INTRALESIONAL; INTRAMUSCULAR; INTRAVENOUS; SOFT TISSUE at 02:08

## 2024-08-25 RX ADMIN — TICAGRELOR 90 MG: 90 TABLET ORAL at 09:08

## 2024-08-25 RX ADMIN — MONTELUKAST 10 MG: 10 TABLET, FILM COATED ORAL at 09:08

## 2024-08-25 RX ADMIN — IPRATROPIUM BROMIDE AND ALBUTEROL SULFATE 3 ML: .5; 3 SOLUTION RESPIRATORY (INHALATION) at 02:08

## 2024-08-25 RX ADMIN — HYDRALAZINE HYDROCHLORIDE 50 MG: 50 TABLET ORAL at 09:08

## 2024-08-25 RX ADMIN — BUDESONIDE 0.5 MG: 0.5 INHALANT RESPIRATORY (INHALATION) at 09:08

## 2024-08-25 RX ADMIN — CARVEDILOL 12.5 MG: 12.5 TABLET, FILM COATED ORAL at 09:08

## 2024-08-25 RX ADMIN — HYDROCODONE BITARTRATE AND ACETAMINOPHEN 1 TABLET: 10; 325 TABLET ORAL at 10:08

## 2024-08-25 NOTE — Clinical Note
The catheter was repositioned into the ostium   right coronary artery. An angiography was performed of the right coronary arteries. Multiple views were taken. 4 = No assist / stand by assistance

## 2024-08-25 NOTE — ED PROVIDER NOTES
"Encounter Date: 8/25/2024    SCRIBE #1 NOTE: I, Jasmine Ontiveros, am scribing for, and in the presence of,  Alban Santana MD.       History   No chief complaint on file.    Patient is a 74 y.o. male that presents to the ED via EMS from [facility] with c/o Weakness and SOB. Patient states he had a lung check done recently and his gums are sore. He states, "I just doesn't feel well."  Patient goes to Dialysis every MWF and doesn't miss a day. He is given breathing treatments to help with his breathing. PMHx consists of CHF (congestive heart failure), COPD (chronic obstructive pulmonary disease), Coronary artery disease, ESRD on dialysis, HTN, and Renal disorder. Patient states it stings a little sometimes when urinating. He states he feels fatigue (weak) and his mouth is sore.         The history is provided by the patient and the EMS personnel. No  was used.     Review of patient's allergies indicates:  No Known Allergies  Past Medical History:   Diagnosis Date    Anticoagulant long-term use     CHF (congestive heart failure)     COPD (chronic obstructive pulmonary disease)     Coronary artery disease     ESRD on dialysis     Hypertension     Renal disorder      Past Surgical History:   Procedure Laterality Date    AV FISTULA PLACEMENT, BRACHIOCEPHALIC      INSERTION OF DIALYSIS CATHETER       Family History   Problem Relation Name Age of Onset    Stroke Mother       Social History     Tobacco Use    Smoking status: Every Day     Current packs/day: 1.00     Average packs/day: 1 pack/day for 15.0 years (15.0 ttl pk-yrs)     Types: Cigarettes    Smokeless tobacco: Never   Substance Use Topics    Alcohol use: Not Currently    Drug use: Not Currently     Review of Systems   Constitutional:  Negative for fever.   Respiratory:  Positive for shortness of breath and wheezing. Negative for cough and chest tightness.    Cardiovascular:  Negative for chest pain and leg swelling.   Gastrointestinal:  " Negative for abdominal pain, diarrhea, nausea and vomiting.   Neurological:  Positive for weakness.   All other systems reviewed and are negative.      Physical Exam     Initial Vitals   BP Pulse Resp Temp SpO2   -- -- -- -- --      MAP       --         Physical Exam    Nursing note and vitals reviewed.  HENT:   Head: Normocephalic and atraumatic.   Mouth/Throat: Oropharynx is clear and moist.   Eyes: Pupils are equal, round, and reactive to light.   Neck: Neck supple.   Normal range of motion.  Cardiovascular:  Normal rate and regular rhythm.           Pulmonary/Chest: Effort normal. He has wheezes.   Abdominal: Abdomen is soft. He exhibits no distension.   Musculoskeletal:         General: Normal range of motion.      Cervical back: Normal range of motion and neck supple.     Neurological: He is alert.   Skin: Skin is warm. Capillary refill takes less than 2 seconds.   Psychiatric: He has a normal mood and affect.         ED Course   Procedures  Labs Reviewed - No data to display  EKG Readings: (Independently Interpreted)   Heart Rate: 91 bpm.   Sinus rhythm with frequent PACs   rSr'(V1) - probable normal variant   Inferior/lateral T wave abnormality is nonspecific   Borderline ECG       Imaging Results    None       X-Rays:   Independently Interpreted Readings:   Other Readings:  EXAMINATION:  XR CHEST AP PORTABLE     CLINICAL HISTORY:  CHF;     COMPARISON:  4 November 2021     TECHNIQUE:  XR CHEST AP PORTABLE     FINDINGS:  The heart and mediastinum are stable in size and configuration.  The pulmonary vascularity is slightly increased with bilateral increased interstitial lung density.  No other lung infiltrates, effusions, pneumothorax or other abnormality is demonstrated.     Impression:     Findings suggest mild cardiac decompensation and / or pneumonitis.        Electronically signed by:Mohit Bowser  Date:                                            08/25/2024  Time:                                          "  14:21      Medications - No data to display  Medical Decision Making  Amount and/or Complexity of Data Reviewed  Labs: ordered.  Radiology: ordered.    Risk  Prescription drug management.              Attending Attestation:           Physician Attestation for Scribe:  Physician Attestation Statement for Scribe #1: I, Alban Santana MD, reviewed documentation, as scribed by Jasmine Ontiveros in my presence, and it is both accurate and complete.             ED Course as of 08/25/24 1430   Sun Aug 25, 2024   1405 Medical decision-making:  Differential diagnosis includes generalized weakness, pneumonia, CHF exacerbation, COPD exacerbation, anemia  .  All testing ordered and interpreted by me. [BB]   1429 08/25/24 1424  X-Ray Chest AP Portable  Performed: 08/25/24 1415  Final         Impression: Findings suggest mild cardiac decompensation and / or pneumonitis. Electronically signed by: Mohit Bowser Date: 08/25/2024 Time: 14:21       [CM]      ED Course User Index  [BB] Alban Santana MD  [CM] Jasmine Ontiveros                             Clinical Impression:   ***Please document a Clinical Impression and click the "Refresh" button to refresh your note and automatically pull in before signing.***           "

## 2024-08-25 NOTE — ASSESSMENT & PLAN NOTE
Patient SOB, denies chest pain  Troponin trend 569, 2nd pending  Cardiac monitoring  Aspirin, statin, beta-blocker  ProBNP 14,404  Echo ordered  Brilinta 90 mg BID  We will continue to monitor

## 2024-08-25 NOTE — PHARMACY MED REC
"Admission Medication History     The home medication history was taken by Yani Dave.    You may go to "Admission" then "Reconcile Home Medications" tabs to review and/or act upon these items.     The home medication list has been updated by the Pharmacy department.   Please read ALL comments highlighted in yellow.   Please address this information as you see fit.    Feel free to contact us if you have any questions or require assistance.  Medication Updated:  Symbicort updated to Pulmicort      Patient reports no longer taking the following medication(s). The medication(s) listed below were removed from the home medication list. Please reorder if appropriate:  Clonidine 0.1 mg  Clopidogrel 75 mg  Pravastatin 40 mg  Pantoprazole 40 mg  Allopurinol 100 mg  Norco 10/325 mg  Amlodipine 5 mg  Albuterol 90 mcg inhaler  Spiriva Respimat 2.5 mcg  Azithromycin 250 mg  Linezolid 600 mg  Theophylline 400 mg 24 hr    Medications listed below were obtained from: [employer] [employer] and Nursing home  (Not in a hospital admission)        Current Outpatient Medications on File Prior to Encounter   Medication Sig Dispense Refill Last Dose    albuterol-ipratropium (DUO-NEB) 2.5 mg-0.5 mg/3 mL nebulizer solution Take 3 mLs by nebulization every 6 (six) hours as needed for Wheezing. Rescue   8/25/2024    arformoteroL (BROVANA) 15 mcg/2 mL Nebu Take 15 mcg by nebulization 2 (two) times a day. Controller   8/25/2024    aspirin (ECOTRIN) 81 MG EC tablet Take 81 mg by mouth once daily.   8/25/2024    budesonide (PULMICORT) 0.5 mg/2 mL nebulizer solution Take 0.5 mg by nebulization 2 (two) times a day.   8/25/2024    carvediloL (COREG) 12.5 MG tablet Take 12.5 mg by mouth 2 (two) times daily.   8/25/2024    hydrALAZINE (APRESOLINE) 100 MG tablet Take 1 tablet (100 mg total) by mouth 3 (three) times daily. 270 tablet 0 8/25/2024    HYDROcodone-acetaminophen (NORCO)  mg per tablet Take 1 tablet by mouth 3 (three) times daily " as needed for pain 90 tablet 0 8/25/2024    montelukast (SINGULAIR) 10 mg tablet Take 1 tablet (10 mg total) by mouth every evening. 90 tablet 1 8/25/2024    olopatadine (PATANOL) 0.1 % ophthalmic solution Place 1 drop into both eyes once daily.   8/25/2024    predniSONE (DELTASONE) 20 MG tablet Take 1 tablet (20 mg total) by mouth 2 (two) times daily with meals. 60 tablet 0 8/25/2024    ROFLUMILAST ORAL Take 500 mcg by mouth Daily.   8/25/2024    rosuvastatin (CRESTOR) 20 MG tablet Take 1 tablet (20 mg total) by mouth nightly for cholesterol. 90 tablet 1 8/25/2024    sevelamer carbonate (RENVELA) 800 mg Tab Take 800 mg by mouth 3 (three) times daily with meals.   8/25/2024    ticagrelor (BRILINTA) 90 mg tablet Take 1 tablet (90 mg total) by mouth 2 (two) times a day. 60 tablet 2 8/25/2024    [DISCONTINUED] budesonide-formoterol 160-4.5 mcg (SYMBICORT) 160-4.5 mcg/actuation HFAA Use as directed, inhaling two (2) puffs by mouth twice a day. 10.2 g 8     [DISCONTINUED] albuterol (PROVENTIL/VENTOLIN HFA) 90 mcg/actuation inhaler Inhale 2 puffs into the lungs every 6 (six) hours as needed for Wheezing. Rescue 6.7 g 1     [DISCONTINUED] albuterol (VENTOLIN HFA) 90 mcg/actuation inhaler Use as directed, inhaling one (1) to two (2) puffs by mouth every 4 hours as needed. 18 g 10     [DISCONTINUED] allopurinoL (ZYLOPRIM) 100 MG tablet Take 100 mg by mouth once daily.       [DISCONTINUED] allopurinoL (ZYLOPRIM) 100 MG tablet Allopurinol 100 MG Oral Tablet QTY: 30  Days: 0 Refills: 5  Written: 06/25/20 Patient Instructions: allopurinol 100 mg oral tablet Dispense: 30 - TAKE 1 TABLET BY MOUTH DAILY Tablet       [DISCONTINUED] allopurinoL (ZYLOPRIM) 100 MG tablet Take 1 tablet (100 mg total) by mouth once daily. 30 tablet 5     [DISCONTINUED] amLODIPine (NORVASC) 5 MG tablet Take 5 mg by mouth once daily.       [DISCONTINUED] azithromycin (Z-NITZA) 250 MG tablet Take 2 tablets by mouth on day one, then take 1 tablet by mouth  daily days 2-5. 6 tablet 0     [DISCONTINUED] carvediloL (COREG) 12.5 MG tablet Take 12.5 mg by mouth 2 (two) times daily.       [DISCONTINUED] carvediloL (COREG) 12.5 MG tablet Take 1 tablet (12.5 mg total) by mouth 2 (two) times a day. 180 tablet 2     [DISCONTINUED] cloNIDine (CATAPRES) 0.1 MG tablet Take 0.1 mg by mouth.       [DISCONTINUED] clopidogreL (PLAVIX) 75 mg tablet Take 1 tablet (75 mg total) by mouth once daily. 30 tablet 11     [DISCONTINUED] hydrALAZINE (APRESOLINE) 100 MG tablet Take 100 mg by mouth 3 (three) times daily.       [DISCONTINUED] HYDROcodone-acetaminophen (NORCO)  mg per tablet TAKE 1 TABLET BY MOUTH 2 TO 3 TIMES A DAY WITH FOOD AS NEEDED FOR PAIN CAUSES DROWSINESS-AVOID ALCOHOL!!       [DISCONTINUED] HYDROcodone-acetaminophen (NORCO)  mg per tablet Take 1 tablet by mouth 3 (three) times daily as needed for pain .. May cause drowsiness 90 tablet 0     [DISCONTINUED] linezolid (ZYVOX) 600 mg Tab Take 1 tablet (600 mg total) by mouth every 12 (twelve) hours. for 7 days 14 tablet 0     [DISCONTINUED] pantoprazole (PROTONIX) 40 MG tablet Take 40 mg by mouth once daily.       [DISCONTINUED] pravastatin (PRAVACHOL) 40 MG tablet Take 40 mg by mouth every evening.       [DISCONTINUED] predniSONE (DELTASONE) 20 MG tablet Take 2 tablets (40 mg total) by mouth 2 (two) times daily with meals. 10 tablet 0     [DISCONTINUED] theophylline (ADELSO-24) 400 mg 24 hr capsule take 400 mg by mouth daily 30 capsule 0     [DISCONTINUED] tiotropium bromide (SPIRIVA RESPIMAT) 2.5 mcg/actuation inhaler Inhale 2 puffs into the lungs Daily. Controller 4 g 5          Potential issues to be addressed PRIOR TO DISCHARGE  No issues identified.    Yani Dave  Pharmacy [facility] Specialist - Medication History  EXT. [phone]    .

## 2024-08-25 NOTE — SUBJECTIVE & OBJECTIVE
Past Medical History:   Diagnosis Date    Anticoagulant long-term use     COPD (chronic obstructive pulmonary disease)     Coronary artery disease     ESRD on dialysis     HFrEF (heart failure with reduced ejection fraction) 2021    Hypertension        Past Surgical History:   Procedure Laterality Date    AV FISTULA PLACEMENT, BRACHIOCEPHALIC      INSERTION OF DIALYSIS CATHETER         Review of patient's allergies indicates:  No Known Allergies    No current facility-administered medications on file prior to encounter.     Current Outpatient Medications on File Prior to Encounter   Medication Sig    albuterol-ipratropium (DUO-NEB) 2.5 mg-0.5 mg/3 mL nebulizer solution Take 3 mLs by nebulization every 6 (six) hours as needed for Wheezing. Rescue    arformoteroL (BROVANA) 15 mcg/2 mL Nebu Take 15 mcg by nebulization 2 (two) times a day. Controller    aspirin (ECOTRIN) 81 MG EC tablet Take 81 mg by mouth once daily.    budesonide (PULMICORT) 0.5 mg/2 mL nebulizer solution Take 0.5 mg by nebulization 2 (two) times a day.    carvediloL (COREG) 12.5 MG tablet Take 12.5 mg by mouth 2 (two) times daily.    hydrALAZINE (APRESOLINE) 100 MG tablet Take 1 tablet (100 mg total) by mouth 3 (three) times daily.    HYDROcodone-acetaminophen (NORCO)  mg per tablet Take 1 tablet by mouth 3 (three) times daily as needed for pain    montelukast (SINGULAIR) 10 mg tablet Take 1 tablet (10 mg total) by mouth every evening.    olopatadine (PATANOL) 0.1 % ophthalmic solution Place 1 drop into both eyes once daily.    predniSONE (DELTASONE) 20 MG tablet Take 1 tablet (20 mg total) by mouth 2 (two) times daily with meals.    ROFLUMILAST ORAL Take 500 mcg by mouth Daily.    rosuvastatin (CRESTOR) 20 MG tablet Take 1 tablet (20 mg total) by mouth nightly for cholesterol.    sevelamer carbonate (RENVELA) 800 mg Tab Take 800 mg by mouth 3 (three) times daily with meals.    ticagrelor (BRILINTA) 90 mg tablet Take 1 tablet (90 mg total) by  mouth 2 (two) times a day.    [DISCONTINUED] budesonide-formoterol 160-4.5 mcg (SYMBICORT) 160-4.5 mcg/actuation HFAA Use as directed, inhaling two (2) puffs by mouth twice a day.    [DISCONTINUED] albuterol (PROVENTIL/VENTOLIN HFA) 90 mcg/actuation inhaler Inhale 2 puffs into the lungs every 6 (six) hours as needed for Wheezing. Rescue    [DISCONTINUED] albuterol (VENTOLIN HFA) 90 mcg/actuation inhaler Use as directed, inhaling one (1) to two (2) puffs by mouth every 4 hours as needed.    [DISCONTINUED] allopurinoL (ZYLOPRIM) 100 MG tablet Take 100 mg by mouth once daily.    [DISCONTINUED] allopurinoL (ZYLOPRIM) 100 MG tablet Allopurinol 100 MG Oral Tablet QTY: 30  Days: 0 Refills: 5  Written: 06/25/20 Patient Instructions: allopurinol 100 mg oral tablet Dispense: 30 - TAKE 1 TABLET BY MOUTH DAILY Tablet    [DISCONTINUED] allopurinoL (ZYLOPRIM) 100 MG tablet Take 1 tablet (100 mg total) by mouth once daily.    [DISCONTINUED] amLODIPine (NORVASC) 5 MG tablet Take 5 mg by mouth once daily.    [DISCONTINUED] azithromycin (Z-NITZA) 250 MG tablet Take 2 tablets by mouth on day one, then take 1 tablet by mouth daily days 2-5.    [DISCONTINUED] carvediloL (COREG) 12.5 MG tablet Take 12.5 mg by mouth 2 (two) times daily.    [DISCONTINUED] carvediloL (COREG) 12.5 MG tablet Take 1 tablet (12.5 mg total) by mouth 2 (two) times a day.    [DISCONTINUED] cloNIDine (CATAPRES) 0.1 MG tablet Take 0.1 mg by mouth.    [DISCONTINUED] clopidogreL (PLAVIX) 75 mg tablet Take 1 tablet (75 mg total) by mouth once daily.    [DISCONTINUED] hydrALAZINE (APRESOLINE) 100 MG tablet Take 100 mg by mouth 3 (three) times daily.    [DISCONTINUED] HYDROcodone-acetaminophen (NORCO)  mg per tablet TAKE 1 TABLET BY MOUTH 2 TO 3 TIMES A DAY WITH FOOD AS NEEDED FOR PAIN CAUSES DROWSINESS-AVOID ALCOHOL!!    [DISCONTINUED] HYDROcodone-acetaminophen (NORCO)  mg per tablet Take 1 tablet by mouth 3 (three) times daily as needed for pain .. May cause  drowsiness    [DISCONTINUED] linezolid (ZYVOX) 600 mg Tab Take 1 tablet (600 mg total) by mouth every 12 (twelve) hours. for 7 days    [DISCONTINUED] pantoprazole (PROTONIX) 40 MG tablet Take 40 mg by mouth once daily.    [DISCONTINUED] pravastatin (PRAVACHOL) 40 MG tablet Take 40 mg by mouth every evening.    [DISCONTINUED] predniSONE (DELTASONE) 20 MG tablet Take 2 tablets (40 mg total) by mouth 2 (two) times daily with meals.    [DISCONTINUED] theophylline (ADELSO-24) 400 mg 24 hr capsule take 400 mg by mouth daily    [DISCONTINUED] tiotropium bromide (SPIRIVA RESPIMAT) 2.5 mcg/actuation inhaler Inhale 2 puffs into the lungs Daily. Controller     Family History       Problem Relation (Age of Onset)    Stroke Mother          Tobacco Use    Smoking status: Every Day     Current packs/day: 1.00     Average packs/day: 1 pack/day for 15.0 years (15.0 ttl pk-yrs)     Types: Cigarettes    Smokeless tobacco: Never   Substance and Sexual Activity    Alcohol use: Not Currently    Drug use: Not Currently    Sexual activity: Not on file     Review of Systems   Constitutional:  Positive for activity change, appetite change and fatigue. Negative for fever and unexpected weight change.   HENT:  Positive for dental problem and mouth sores. Negative for ear pain, nosebleeds, postnasal drip, rhinorrhea, sinus pain, sneezing and trouble swallowing.    Eyes:  Positive for discharge and visual disturbance. Negative for pain and itching.   Respiratory:  Positive for cough, shortness of breath and wheezing. Negative for apnea, choking, chest tightness and stridor.    Cardiovascular:  Negative for chest pain, palpitations and leg swelling.   Gastrointestinal:  Negative for abdominal distention, diarrhea, nausea and vomiting.   Endocrine: Negative for cold intolerance and heat intolerance.   Genitourinary:  Positive for decreased urine volume. Negative for difficulty urinating, dysuria, hematuria and urgency.   Musculoskeletal:  Positive  for back pain. Negative for arthralgias, neck pain and neck stiffness.   Skin:  Negative for rash and wound.   Allergic/Immunologic: Negative for environmental allergies and food allergies.   Neurological:  Positive for weakness. Negative for tremors, syncope, facial asymmetry, light-headedness, numbness and headaches.   Psychiatric/Behavioral:  Negative for behavioral problems, dysphoric mood and hallucinations. The patient is not nervous/anxious and is not hyperactive.    All other systems reviewed and are negative.    Objective:     Vital Signs (Most Recent):  Temp: 98.4 °F (36.9 °C) (08/25/24 1400)  Pulse: 95 (08/25/24 1831)  Resp: 15 (08/25/24 1831)  BP: (!) 160/93 (08/25/24 1831)  SpO2: 100 % (08/25/24 1831) Vital Signs (24h Range):  Temp:  [98.4 °F (36.9 °C)] 98.4 °F (36.9 °C)  Pulse:  [] 95  Resp:  [11-19] 15  SpO2:  [98 %-100 %] 100 %  BP: (132-164)/(60-93) 160/93     Weight: 80.7 kg (178 lb)  Body mass index is 24.83 kg/m².     Physical Exam  Vitals and nursing note reviewed.   Constitutional:       General: He is awake. He is not in acute distress.     Appearance: Normal appearance. He is well-developed, well-groomed and normal weight. He is ill-appearing. He is not toxic-appearing or diaphoretic.      Interventions: Nasal cannula in place.   HENT:      Head: Normocephalic and atraumatic.      Mouth/Throat:      Mouth: Mucous membranes are moist. Oral lesions present.      Dentition: Abnormal dentition.      Pharynx: Oropharynx is clear.     Eyes:      General:         Right eye: Discharge present.         Left eye: Discharge present.  Cardiovascular:      Rate and Rhythm: Normal rate and regular rhythm.      Pulses: Normal pulses.      Heart sounds: Normal heart sounds. No murmur heard.  Pulmonary:      Breath sounds: Wheezing and rhonchi present.   Abdominal:      General: There is no distension.      Palpations: Abdomen is soft.      Tenderness: There is no abdominal tenderness.    Musculoskeletal:        Arms:       Right lower leg: No edema.      Left lower leg: No edema.   Skin:     General: Skin is warm and dry.   Neurological:      Mental Status: He is alert and oriented to person, place, and time.   Psychiatric:         Mood and Affect: Mood normal.         Behavior: Behavior normal. Behavior is cooperative.         Thought Content: Thought content normal.                Significant Labs: All pertinent labs within the past 24 hours have been reviewed.    Significant Imaging: I have reviewed all pertinent imaging results/findings within the past 24 hours.

## 2024-08-25 NOTE — ED NOTES
Pt reports it may be difficult to obtain a specimen as pt reports he only urinates 2-3 times a day.

## 2024-08-26 PROBLEM — I21.4 NSTEMI (NON-ST ELEVATED MYOCARDIAL INFARCTION): Status: ACTIVE | Noted: 2024-08-26

## 2024-08-26 PROBLEM — J96.01 ACUTE RESPIRATORY FAILURE WITH HYPOXIA AND HYPERCARBIA: Status: ACTIVE | Noted: 2024-08-26

## 2024-08-26 PROBLEM — E87.5 HYPERKALEMIA: Status: ACTIVE | Noted: 2024-08-26

## 2024-08-26 PROBLEM — I50.23 ACUTE ON CHRONIC HFREF (HEART FAILURE WITH REDUCED EJECTION FRACTION): Status: ACTIVE | Noted: 2021-03-19

## 2024-08-26 PROBLEM — J96.02 ACUTE RESPIRATORY FAILURE WITH HYPOXIA AND HYPERCARBIA: Status: ACTIVE | Noted: 2024-08-26

## 2024-08-26 LAB
ACANTHOCYTES BLD QL SMEAR: ABNORMAL
ANION GAP SERPL CALCULATED.3IONS-SCNC: 13 MMOL/L (ref 7–16)
ANISOCYTOSIS BLD QL SMEAR: ABNORMAL
AORTIC ROOT ANNULUS: 3.43 CM
AORTIC VALVE CUSP SEPERATION: 1.7 CM
APICAL FOUR CHAMBER EJECTION FRACTION: 45 %
APTT PPP: 135.7 SECONDS (ref 25.2–37.3)
APTT PPP: 182.9 SECONDS (ref 25.2–37.3)
APTT PPP: 48.7 SECONDS (ref 25.2–37.3)
AV INDEX (PROSTH): 0.54
AV MEAN GRADIENT: 6 MMHG
AV PEAK GRADIENT: 13 MMHG
AV VALVE AREA BY VELOCITY RATIO: 1.44 CM²
AV VALVE AREA: 1.26 CM²
AV VELOCITY RATIO: 0.62
BASOPHILS # BLD AUTO: 0.01 K/UL (ref 0–0.2)
BASOPHILS NFR BLD AUTO: 0.2 % (ref 0–1)
BSA FOR ECHO PROCEDURE: 1.88 M2
BUN SERPL-MCNC: 72 MG/DL (ref 7–18)
BUN/CREAT SERPL: 8 (ref 6–20)
CALCIUM SERPL-MCNC: 8.9 MG/DL (ref 8.5–10.1)
CHLORIDE SERPL-SCNC: 106 MMOL/L (ref 98–107)
CO2 SERPL-SCNC: 24 MMOL/L (ref 21–32)
CREAT SERPL-MCNC: 8.67 MG/DL (ref 0.7–1.3)
CRENATED CELLS: ABNORMAL
CV ECHO LV RWT: 1.1 CM
DIFFERENTIAL METHOD BLD: ABNORMAL
DOP CALC AO PEAK VEL: 1.77 M/S
DOP CALC AO VTI: 32.4 CM
DOP CALC LVOT AREA: 2.3 CM2
DOP CALC LVOT DIAMETER: 1.72 CM
DOP CALC LVOT PEAK VEL: 1.1 M/S
DOP CALC LVOT STROKE VOLUME: 40.87 CM3
DOP CALCLVOT PEAK VEL VTI: 17.6 CM
E WAVE DECELERATION TIME: 173.74 MSEC
E/A RATIO: 0.35
E/E' RATIO: 10.6 M/S
ECHO LV POSTERIOR WALL: 2.01 CM (ref 0.6–1.1)
EGFR (NO RACE VARIABLE) (RUSH/TITUS): 6 ML/MIN/1.73M2
EJECTION FRACTION: 45 %
EOSINOPHIL # BLD AUTO: 0 K/UL (ref 0–0.5)
EOSINOPHIL NFR BLD AUTO: 0 % (ref 1–4)
ERYTHROCYTE [DISTWIDTH] IN BLOOD BY AUTOMATED COUNT: 18.8 % (ref 11.5–14.5)
FRACTIONAL SHORTENING: 21 % (ref 28–44)
GLUCOSE SERPL-MCNC: 91 MG/DL (ref 74–106)
HCT VFR BLD AUTO: 29.9 % (ref 40–54)
HGB BLD-MCNC: 9.2 G/DL (ref 13.5–18)
IMM GRANULOCYTES # BLD AUTO: 0.23 K/UL (ref 0–0.04)
IMM GRANULOCYTES NFR BLD: 3.5 % (ref 0–0.4)
INTERVENTRICULAR SEPTUM: 1.89 CM (ref 0.6–1.1)
IVC DIAMETER: 1.88 CM
LEFT ATRIUM AREA SYSTOLIC (APICAL 4 CHAMBER): 15.31 CM2
LEFT ATRIUM SIZE: 3.43 CM
LEFT INTERNAL DIMENSION IN SYSTOLE: 2.86 CM (ref 2.1–4)
LEFT VENTRICLE DIASTOLIC VOLUME INDEX: 29.33 ML/M2
LEFT VENTRICLE DIASTOLIC VOLUME: 55.72 ML
LEFT VENTRICLE END DIASTOLIC VOLUME APICAL 4 CHAMBER: 33.45 ML
LEFT VENTRICLE END SYSTOLIC VOLUME APICAL 4 CHAMBER: 41.29 ML
LEFT VENTRICLE MASS INDEX: 167 G/M2
LEFT VENTRICLE SYSTOLIC VOLUME INDEX: 16.4 ML/M2
LEFT VENTRICLE SYSTOLIC VOLUME: 31.1 ML
LEFT VENTRICULAR INTERNAL DIMENSION IN DIASTOLE: 3.64 CM (ref 3.5–6)
LEFT VENTRICULAR MASS: 317.12 G
LV LATERAL E/E' RATIO: 7.57 M/S
LV SEPTAL E/E' RATIO: 17.67 M/S
LVED V (TEICH): 55.72 ML
LVES V (TEICH): 31.1 ML
LVOT MG: 2.75 MMHG
LVOT MV: 0.78 CM/S
LYMPHOCYTES # BLD AUTO: 0.19 K/UL (ref 1–4.8)
LYMPHOCYTES NFR BLD AUTO: 2.9 % (ref 27–41)
LYMPHOCYTES NFR BLD MANUAL: 2 % (ref 27–41)
MACROCYTES BLD QL SMEAR: ABNORMAL
MCH RBC QN AUTO: 29.6 PG (ref 27–31)
MCHC RBC AUTO-ENTMCNC: 30.8 G/DL (ref 32–36)
MCV RBC AUTO: 96.1 FL (ref 80–96)
MONOCYTES # BLD AUTO: 0.19 K/UL (ref 0–0.8)
MONOCYTES NFR BLD AUTO: 2.9 % (ref 2–6)
MONOCYTES NFR BLD MANUAL: 2 % (ref 2–6)
MPC BLD CALC-MCNC: ABNORMAL G/DL
MV PEAK A VEL: 1.5 M/S
MV PEAK E VEL: 0.53 M/S
MV STENOSIS PRESSURE HALF TIME: 50.39 MS
MV VALVE AREA P 1/2 METHOD: 4.37 CM2
NEUTROPHILS # BLD AUTO: 6.04 K/UL (ref 1.8–7.7)
NEUTROPHILS NFR BLD AUTO: 90.5 % (ref 53–65)
NEUTS BAND NFR BLD MANUAL: 2 % (ref 1–5)
NEUTS SEG NFR BLD MANUAL: 94 % (ref 50–62)
NRBC # BLD AUTO: 0 X10E3/UL
NRBC, AUTO (.00): 0 %
OHS QRS DURATION: 88 MS
OHS QTC CALCULATION: 382 MS
OVALOCYTES BLD QL SMEAR: ABNORMAL
PISA TR MAX VEL: 3 M/S
PLATELET # BLD AUTO: 66 K/UL (ref 150–400)
PLATELET MORPHOLOGY: ABNORMAL
POTASSIUM SERPL-SCNC: 7.2 MMOL/L (ref 3.5–5.1)
PV PEAK GRADIENT: 2 MMHG
PV PEAK VELOCITY: 0.68 M/S
RA MAJOR: 3.88 CM
RA PRESSURE ESTIMATED: 3 MMHG
RBC # BLD AUTO: 3.11 M/UL (ref 4.6–6.2)
RIGHT VENTRICLE DIASTOLIC BASEL DIMENSION: 3.6 CM
RIGHT VENTRICLE DIASTOLIC LENGTH: 3.9 CM
RIGHT VENTRICLE DIASTOLIC MID DIMENSION: 3.2 CM
RIGHT VENTRICULAR LENGTH IN DIASTOLE (APICAL 4-CHAMBER VIEW): 3.88 CM
RV MID DIAMA: 3.22 CM
RV TB RVSP: 6 MMHG
SODIUM SERPL-SCNC: 136 MMOL/L (ref 136–145)
TDI LATERAL: 0.07 M/S
TDI SEPTAL: 0.03 M/S
TDI: 0.05 M/S
TR MAX PG: 36 MMHG
TRICUSPID ANNULAR PLANE SYSTOLIC EXCURSION: 1.23 CM
TV REST PULMONARY ARTERY PRESSURE: 39 MMHG
WBC # BLD AUTO: 6.66 K/UL (ref 4.5–11)
Z-SCORE OF LEFT VENTRICULAR DIMENSION IN END DIASTOLE: -3.82
Z-SCORE OF LEFT VENTRICULAR DIMENSION IN END SYSTOLE: -1.1

## 2024-08-26 PROCEDURE — 85025 COMPLETE CBC W/AUTO DIFF WBC: CPT | Performed by: INTERNAL MEDICINE

## 2024-08-26 PROCEDURE — 25000003 PHARM REV CODE 250

## 2024-08-26 PROCEDURE — 99223 1ST HOSP IP/OBS HIGH 75: CPT | Mod: ,,, | Performed by: NURSE PRACTITIONER

## 2024-08-26 PROCEDURE — 94640 AIRWAY INHALATION TREATMENT: CPT

## 2024-08-26 PROCEDURE — 99233 SBSQ HOSP IP/OBS HIGH 50: CPT | Mod: ,,, | Performed by: INTERNAL MEDICINE

## 2024-08-26 PROCEDURE — 25000242 PHARM REV CODE 250 ALT 637 W/ HCPCS: Performed by: INTERNAL MEDICINE

## 2024-08-26 PROCEDURE — 36415 COLL VENOUS BLD VENIPUNCTURE: CPT | Performed by: INTERNAL MEDICINE

## 2024-08-26 PROCEDURE — 25000003 PHARM REV CODE 250: Performed by: INTERNAL MEDICINE

## 2024-08-26 PROCEDURE — 94761 N-INVAS EAR/PLS OXIMETRY MLT: CPT

## 2024-08-26 PROCEDURE — 99900035 HC TECH TIME PER 15 MIN (STAT)

## 2024-08-26 PROCEDURE — 85730 THROMBOPLASTIN TIME PARTIAL: CPT | Performed by: INTERNAL MEDICINE

## 2024-08-26 PROCEDURE — 27000221 HC OXYGEN, UP TO 24 HOURS

## 2024-08-26 PROCEDURE — 25000242 PHARM REV CODE 250 ALT 637 W/ HCPCS

## 2024-08-26 PROCEDURE — 80048 BASIC METABOLIC PNL TOTAL CA: CPT

## 2024-08-26 PROCEDURE — 63600175 PHARM REV CODE 636 W HCPCS: Performed by: INTERNAL MEDICINE

## 2024-08-26 PROCEDURE — 11000001 HC ACUTE MED/SURG PRIVATE ROOM

## 2024-08-26 RX ORDER — NYSTATIN 100000 [USP'U]/ML
500000 SUSPENSION ORAL
Status: DISCONTINUED | OUTPATIENT
Start: 2024-08-26 | End: 2024-08-26

## 2024-08-26 RX ORDER — SODIUM CHLORIDE 9 MG/ML
INJECTION, SOLUTION INTRAVENOUS
Status: DISCONTINUED | OUTPATIENT
Start: 2024-08-26 | End: 2024-08-29 | Stop reason: HOSPADM

## 2024-08-26 RX ORDER — MUPIROCIN 20 MG/G
OINTMENT TOPICAL 2 TIMES DAILY
Status: DISCONTINUED | OUTPATIENT
Start: 2024-08-26 | End: 2024-08-29 | Stop reason: HOSPADM

## 2024-08-26 RX ORDER — IPRATROPIUM BROMIDE AND ALBUTEROL SULFATE 2.5; .5 MG/3ML; MG/3ML
3 SOLUTION RESPIRATORY (INHALATION) EVERY 4 HOURS PRN
Status: DISCONTINUED | OUTPATIENT
Start: 2024-08-26 | End: 2024-08-29 | Stop reason: HOSPADM

## 2024-08-26 RX ORDER — SODIUM CHLORIDE 9 MG/ML
INJECTION, SOLUTION INTRAVENOUS
Status: COMPLETED
Start: 2024-08-26 | End: 2024-08-26

## 2024-08-26 RX ORDER — ENOXAPARIN SODIUM 100 MG/ML
1 INJECTION SUBCUTANEOUS ONCE
Status: COMPLETED | OUTPATIENT
Start: 2024-08-27 | End: 2024-08-27

## 2024-08-26 RX ORDER — LEVOFLOXACIN 5 MG/ML
500 INJECTION, SOLUTION INTRAVENOUS
Status: DISCONTINUED | OUTPATIENT
Start: 2024-08-27 | End: 2024-08-29 | Stop reason: HOSPADM

## 2024-08-26 RX ORDER — NYSTATIN 100000 [USP'U]/ML
500000 SUSPENSION ORAL
Status: DISCONTINUED | OUTPATIENT
Start: 2024-08-26 | End: 2024-08-29 | Stop reason: HOSPADM

## 2024-08-26 RX ADMIN — IPRATROPIUM BROMIDE AND ALBUTEROL SULFATE 3 ML: 2.5; .5 SOLUTION RESPIRATORY (INHALATION) at 07:08

## 2024-08-26 RX ADMIN — HYDRALAZINE HYDROCHLORIDE 50 MG: 50 TABLET ORAL at 03:08

## 2024-08-26 RX ADMIN — ACETAMINOPHEN 650 MG: 325 TABLET ORAL at 09:08

## 2024-08-26 RX ADMIN — MUPIROCIN: 20 OINTMENT TOPICAL at 09:08

## 2024-08-26 RX ADMIN — HEPARIN SODIUM 15 UNITS/KG/HR: 10000 INJECTION, SOLUTION INTRAVENOUS at 09:08

## 2024-08-26 RX ADMIN — HYDROCODONE BITARTRATE AND ACETAMINOPHEN 1 TABLET: 10; 325 TABLET ORAL at 03:08

## 2024-08-26 RX ADMIN — IPRATROPIUM BROMIDE AND ALBUTEROL SULFATE 3 ML: 2.5; .5 SOLUTION RESPIRATORY (INHALATION) at 12:08

## 2024-08-26 RX ADMIN — BUDESONIDE 0.5 MG: 0.5 INHALANT RESPIRATORY (INHALATION) at 07:08

## 2024-08-26 RX ADMIN — ALUMINUM HYDROXIDE, MAGNESIUM HYDROXIDE, AND SIMETHICONE 15 ML: 200; 200; 20 SUSPENSION ORAL at 06:08

## 2024-08-26 RX ADMIN — MONTELUKAST 10 MG: 10 TABLET, FILM COATED ORAL at 09:08

## 2024-08-26 RX ADMIN — SEVELAMER CARBONATE 800 MG: 800 TABLET, FILM COATED ORAL at 06:08

## 2024-08-26 RX ADMIN — NYSTATIN 500000 UNITS: 100000 SUSPENSION ORAL at 09:08

## 2024-08-26 RX ADMIN — METHYLPREDNISOLONE SODIUM SUCCINATE 40 MG: 40 INJECTION, POWDER, FOR SOLUTION INTRAMUSCULAR; INTRAVENOUS at 05:08

## 2024-08-26 RX ADMIN — METHYLPREDNISOLONE SODIUM SUCCINATE 40 MG: 40 INJECTION, POWDER, FOR SOLUTION INTRAMUSCULAR; INTRAVENOUS at 01:08

## 2024-08-26 RX ADMIN — Medication 6 MG: at 09:08

## 2024-08-26 RX ADMIN — BUDESONIDE 0.5 MG: 0.5 INHALANT RESPIRATORY (INHALATION) at 08:08

## 2024-08-26 RX ADMIN — METHYLPREDNISOLONE SODIUM SUCCINATE 40 MG: 40 INJECTION, POWDER, FOR SOLUTION INTRAMUSCULAR; INTRAVENOUS at 06:08

## 2024-08-26 RX ADMIN — NYSTATIN 500000 UNITS: 100000 SUSPENSION ORAL at 03:08

## 2024-08-26 RX ADMIN — IPRATROPIUM BROMIDE AND ALBUTEROL SULFATE 3 ML: .5; 3 SOLUTION RESPIRATORY (INHALATION) at 03:08

## 2024-08-26 RX ADMIN — SODIUM CHLORIDE 2000 ML: 9 INJECTION, SOLUTION INTRAVENOUS at 12:08

## 2024-08-26 RX ADMIN — TICAGRELOR 90 MG: 90 TABLET ORAL at 09:08

## 2024-08-26 NOTE — SUBJECTIVE & OBJECTIVE
Interval History: Patient seen and examined at the bedside, reports feeling short of breath, no chest pain.     Review of Systems   Respiratory:  Positive for shortness of breath.    Cardiovascular:  Negative for chest pain.     Objective:     Vital Signs (Most Recent):  Temp: 97.5 °F (36.4 °C) (08/26/24 0902)  Pulse: 100 (08/26/24 1130)  Resp: 16 (08/26/24 0800)  BP: (!) 119/59 (08/26/24 1130)  SpO2: 99 % (08/26/24 0800) Vital Signs (24h Range):  Temp:  [97.5 °F (36.4 °C)-99.1 °F (37.3 °C)] 97.5 °F (36.4 °C)  Pulse:  [] 100  Resp:  [10-22] 16  SpO2:  [94 %-100 %] 99 %  BP: ()/(34-95) 119/59     Weight: 70.9 kg (156 lb 4.9 oz)  Body mass index is 21.8 kg/m².    Intake/Output Summary (Last 24 hours) at 8/26/2024 1216  Last data filed at 8/26/2024 0656  Gross per 24 hour   Intake 243.87 ml   Output 200 ml   Net 43.87 ml         Physical Exam  Vitals and nursing note reviewed.   Constitutional:       Appearance: He is ill-appearing.   HENT:      Head: Normocephalic and atraumatic.      Nose: Nose normal.      Mouth/Throat:      Mouth: Mucous membranes are moist.   Eyes:      Extraocular Movements: Extraocular movements intact.   Cardiovascular:      Rate and Rhythm: Normal rate and regular rhythm.      Pulses: Normal pulses.      Heart sounds: Normal heart sounds.   Pulmonary:      Effort: Respiratory distress present.      Breath sounds: Rales present.   Abdominal:      General: Bowel sounds are normal.      Palpations: Abdomen is soft.   Musculoskeletal:         General: Normal range of motion.      Cervical back: Normal range of motion.   Skin:     General: Skin is warm.   Neurological:      General: No focal deficit present.      Mental Status: He is alert and oriented to person, place, and time. Mental status is at baseline.   Psychiatric:         Mood and Affect: Mood normal.         Behavior: Behavior normal.             Significant Labs: All pertinent labs within the past 24 hours have been  reviewed.    Significant Imaging: I have reviewed all pertinent imaging results/findings within the past 24 hours.

## 2024-08-26 NOTE — HOSPITAL COURSE
8/26- Undergoing HD today, cardiology consulted, echocardiogram pending.   8/27- Echo suggestive of wall motion changes, cardiology follow, awaiting recommendations regarding ischemic eval.   8/28 s/p LHC yesterday which showed 65% instent restenosis in Left Circ- medical management recommended. Hgb noted to be 2 points or so lower all of a sudden, getting transfusion.   8/29- Hgb remain stable. Discharge back to SNF. Hematology referral on discharge for thrombocytopenia. Cardiology has stopped aspirin for thrombocytopenia, continue Brillinta.

## 2024-08-26 NOTE — PLAN OF CARE
[facility]  Initial Discharge Assessment       Primary Care Provider: No, Primary Doctor    Admission Diagnosis: Shortness of breath [R06.02]  ACS (acute coronary syndrome) [I24.9]  Chest pain [R07.9]  Acute on chronic HFrEF (heart failure with reduced ejection fraction) [I50.23]    Admission Date: 8/25/2024  Expected Discharge Date: 8/28/2024    Transition of Care Barriers: None    Payor: MEDICARE / Plan: MEDICARE PART A & B / Product Type: Government /     Extended Emergency Contact Information  Primary Emergency Contact: Barb Damon  Mobile Phone: [phone]  Relation: Daughter  Preferred language: English   needed? No    Discharge Plan A: Skilled Nursing Facility  Discharge Plan B: Home with family, Home Health      [facility] - [place], [place] - [address] [place] [address]  [address] [place] [address]  Phone: [phone] Fax: [phone]      Initial Assessment (most recent)       Adult Discharge Assessment - 08/26/24 1359          Discharge Assessment    Assessment Type Discharge Planning Assessment     Confirmed/corrected address, phone number and insurance Yes     Confirmed Demographics Correct on Facesheet     Source of Information family     If unable to respond/provide information was family/caregiver contacted? Yes     Contact Name/Number Barb Damon, daughter, [phone]     People in Home alone     Facility Arrived From: [facility]   skilled care    Do you expect to return to your current living situation? Yes     Do you have help at home or someone to help you manage your care at home? Yes     Who are your caregiver(s) and their phone number(s)? Barb Damon, daughter, [phone]     Prior to hospitilization cognitive status: Unable to Assess     Current cognitive status: Unable to Assess     Walking or Climbing Stairs Difficulty yes     Walking or Climbing Stairs ambulation difficulty, assistance 1 person;stair climbing  difficulty, assistance 1 person;transferring difficulty, assistance 1 person     Dressing/Bathing Difficulty yes     Dressing/Bathing bathing difficulty, assistance 1 person;dressing difficulty, assistance 1 person     Home Accessibility wheelchair accessible     Home Layout Able to live on 1st floor     Equipment Currently Used at Home oxygen     Readmission within 30 days? No     Patient currently being followed by outpatient case management? No     Do you currently have service(s) that help you manage your care at home? No     Do you take prescription medications? Yes     Do you have prescription coverage? Yes     Coverage Medicare     Do you have any problems affording any of your prescribed medications? No     Is the patient taking medications as prescribed? yes     Who is going to help you get home at discharge? Barb Damon, daughter, [phone]     How do you get to doctors appointments? family or friend will provide     Are you on dialysis? Yes     Dialysis Name and Scheduled days [id number]     Do you take coumadin? No     Discharge Plan A Skilled Nursing Facility     Discharge Plan B Home with family;Home Health     DME Needed Upon Discharge  none     Discharge Plan discussed with: Adult children     Transition of Care Barriers None        Physical Activity    On average, how many days per week do you engage in moderate to strenuous exercise (like a brisk walk)? 0 days     On average, how many minutes do you engage in exercise at this level? 0 min        Financial Resource Strain    How hard is it for you to pay for the very basics like food, housing, medical care, and heating? Not hard at all        Housing Stability    In the last 12 months, was there a time when you were not able to pay the mortgage or rent on time? No     At any time in the past 12 months, were you homeless or living in a shelter (including now)? No        Transportation Needs    Has the lack of transportation kept you  from medical appointments, meetings, work or from getting things needed for daily living? No        Food Insecurity    Within the past 12 months, you worried that your food would run out before you got the money to buy more. Never true     Within the past 12 months, the food you bought just didn't last and you didn't have money to get more. Never true        Stress    Do you feel stress - tense, restless, nervous, or anxious, or unable to sleep at night because your mind is troubled all the time - these days? Not at all        Social Isolation    How often do you feel lonely or isolated from those around you?  Never        Alcohol Use    Q1: How often do you have a drink containing alcohol? Never     Q2: How many drinks containing alcohol do you have on a typical day when you are drinking? Patient does not drink     Q3: How often do you have six or more drinks on one occasion? Never        Utilities    In the past 12 months has the electric, gas, oil, or water company threatened to shut off services in your home? No        Health Literacy    How often do you need to have someone help you when you read instructions, pamphlets, or other written material from your doctor or pharmacy? Rarely                   SS spoke with pt's daughter due to pt still not returning to room. Pt was at [facility] for skilled nursing, but prior he was living at home by himself with his daughter available to help him as needed. Pt currently has required DME (O2), but pt has been using a wc at [facility]. SS obtained choice to return to [facility] if needed at [place]. IM obtained. SDOH completed. SS following for anticipated dc needs.

## 2024-08-26 NOTE — ASSESSMENT & PLAN NOTE
Patient with known CAD s/p  unknown, patient on Brilinta , which is uncontrolled Will continue ASA and Statin and monitor for S/Sx of angina/ACS. Continue to monitor on telemetry.

## 2024-08-26 NOTE — ASSESSMENT & PLAN NOTE
He has severe COPD.  He has required multiple admissions for exacerbations recently.  He requires home O2.

## 2024-08-26 NOTE — ASSESSMENT & PLAN NOTE
Presented with volume overload.   Creatine stable for now. BMP reviewed- noted Estimated Creatinine Clearance: 7.4 mL/min (A) (based on SCr of 8.67 mg/dL (H)). according to latest data. Based on current GFR, CKD stage is end stage.  Monitor UOP and serial BMP and adjust therapy as needed. Renally dose meds. Avoid nephrotoxic medications and procedures.  Dialysis Monday, Wednesday, Friday  Nephrology consulted, appreciate assistance, undergoing urgent HD now.

## 2024-08-26 NOTE — ASSESSMENT & PLAN NOTE
Patient with Hypercapnic and Hypoxic Respiratory failure which is Acute on chronic.  he is on home oxygen at 2 LPM. Supplemental oxygen was provided and noted- Oxygen Concentration (%):  [32] 32    .   Signs/symptoms of respiratory failure include- tachypnea and respiratory distress. Contributing diagnoses includes - CHF and COPD Labs and images were reviewed. Patient Has recent ABG, which has been reviewed. Will treat underlying causes and adjust management of respiratory failure as follows-     Continue volume removal with HD, steroids/antibiotics.   Wean O2 as able.

## 2024-08-26 NOTE — SUBJECTIVE & OBJECTIVE
Past Medical History:   Diagnosis Date    Anticoagulant long-term use     COPD (chronic obstructive pulmonary disease)     Coronary artery disease     ESRD on dialysis     HFrEF (heart failure with reduced ejection fraction) 2021    Hypertension        Past Surgical History:   Procedure Laterality Date    AV FISTULA PLACEMENT, BRACHIOCEPHALIC      INSERTION OF DIALYSIS CATHETER         Review of patient's allergies indicates:  No Known Allergies    No current facility-administered medications on file prior to encounter.     Current Outpatient Medications on File Prior to Encounter   Medication Sig    albuterol-ipratropium (DUO-NEB) 2.5 mg-0.5 mg/3 mL nebulizer solution Take 3 mLs by nebulization every 6 (six) hours as needed for Wheezing. Rescue    arformoteroL (BROVANA) 15 mcg/2 mL Nebu Take 15 mcg by nebulization 2 (two) times a day. Controller    aspirin (ECOTRIN) 81 MG EC tablet Take 81 mg by mouth once daily.    budesonide (PULMICORT) 0.5 mg/2 mL nebulizer solution Take 0.5 mg by nebulization 2 (two) times a day.    carvediloL (COREG) 12.5 MG tablet Take 12.5 mg by mouth 2 (two) times daily.    hydrALAZINE (APRESOLINE) 100 MG tablet Take 1 tablet (100 mg total) by mouth 3 (three) times daily.    HYDROcodone-acetaminophen (NORCO)  mg per tablet Take 1 tablet by mouth 3 (three) times daily as needed for pain    montelukast (SINGULAIR) 10 mg tablet Take 1 tablet (10 mg total) by mouth every evening.    olopatadine (PATANOL) 0.1 % ophthalmic solution Place 1 drop into both eyes once daily.    predniSONE (DELTASONE) 20 MG tablet Take 1 tablet (20 mg total) by mouth 2 (two) times daily with meals.    ROFLUMILAST ORAL Take 500 mcg by mouth Daily.    rosuvastatin (CRESTOR) 20 MG tablet Take 1 tablet (20 mg total) by mouth nightly for cholesterol.    sevelamer carbonate (RENVELA) 800 mg Tab Take 800 mg by mouth 3 (three) times daily with meals.    ticagrelor (BRILINTA) 90 mg tablet Take 1 tablet (90 mg total) by  mouth 2 (two) times a day.     Family History       Problem Relation (Age of Onset)    Stroke Mother          Tobacco Use    Smoking status: Every Day     Current packs/day: 1.00     Average packs/day: 1 pack/day for 15.0 years (15.0 ttl pk-yrs)     Types: Cigarettes    Smokeless tobacco: Never   Substance and Sexual Activity    Alcohol use: Not Currently    Drug use: Not Currently    Sexual activity: Not on file     Review of Systems   Constitutional: Negative for chills and fever.   HENT:          Mouth pain   Cardiovascular:  Positive for dyspnea on exertion. Negative for chest pain and orthopnea.   Respiratory:  Positive for shortness of breath.    Gastrointestinal:  Positive for dysphagia.     Objective:     Vital Signs (Most Recent):  Temp: 97.7 °F (36.5 °C) (08/26/24 1613)  Pulse: 79 (08/26/24 1613)  Resp: 19 (08/26/24 1613)  BP: 108/72 (08/26/24 1613)  SpO2: 97 % (08/26/24 1613) Vital Signs (24h Range):  Temp:  [97.5 °F (36.4 °C)-99.1 °F (37.3 °C)] 97.7 °F (36.5 °C)  Pulse:  [] 79  Resp:  [10-22] 19  SpO2:  [94 %-100 %] 97 %  BP: ()/(34-95) 108/72     Weight: 70.9 kg (156 lb 4.9 oz)  Body mass index is 21.8 kg/m².    SpO2: 97 %         Intake/Output Summary (Last 24 hours) at 8/26/2024 1630  Last data filed at 8/26/2024 0656  Gross per 24 hour   Intake 243.87 ml   Output 200 ml   Net 43.87 ml       Lines/Drains/Airways       Peripheral Intravenous Line  Duration                  Hemodialysis AV Fistula Right forearm -- days         Peripheral IV - Single Lumen 08/25/24 20 G Anterior;Left Forearm 1 day         Peripheral IV - Single Lumen 08/25/24 1655 20 G Other (Comments) No Anterior;Left Upper Arm <1 day                     Physical Exam  Vitals reviewed.   Constitutional:       Appearance: He is ill-appearing.   Cardiovascular:      Rate and Rhythm: Normal rate and regular rhythm.      Heart sounds: Normal heart sounds.   Pulmonary:      Effort: Pulmonary effort is normal.      Breath sounds:  "Wheezing present.   Abdominal:      General: Bowel sounds are normal.      Palpations: Abdomen is soft.   Skin:     General: Skin is warm and dry.   Neurological:      Mental Status: He is alert and oriented to person, place, and time.          Significant Labs: ABG: No results for input(s): "PH", "PCO2", "HCO3", "POCSATURATED", "BE" in the last 48 hours., Blood Culture: No results for input(s): "LABBLOO" in the last 48 hours., BMP:   Recent Labs   Lab 08/25/24  1513 08/26/24  0447   GLU 99 91    136   K 5.9* 7.2*    106   CO2 25 24   BUN 58* 72*   CREATININE 7.96* 8.67*   CALCIUM 8.6 8.9   MG 2.8*  --    , CMP   Recent Labs   Lab 08/25/24  1513 08/26/24  0447    136   K 5.9* 7.2*    106   CO2 25 24   GLU 99 91   BUN 58* 72*   CREATININE 7.96* 8.67*   CALCIUM 8.6 8.9   PROT 5.4*  --    ALBUMIN 2.9*  --    BILITOT 0.4  --    ALKPHOS 65  --    AST 47*  --    ALT 52  --    ANIONGAP 12 13   , CBC   Recent Labs   Lab 08/25/24  1513 08/26/24  0447   WBC 6.25 6.66   HGB 10.0* 9.2*   HCT 32.9* 29.9*   PLT 78* 66*   , Lipid Panel No results for input(s): "CHOL", "HDL", "LDLCALC", "TRIG", "CHOLHDL" in the last 48 hours., and Troponin No results for input(s): "TROPONINI" in the last 48 hours.    Significant Imaging: Cardiac Cath: 2020 & 2022 ,     Echocardiogram: Transthoracic echo (TTE) complete (Cupid Only):   Results for orders placed or performed during the hospital encounter of 08/25/24   Echo   Result Value Ref Range    BSA 1.88 m2    A4C EF 45 %    LVOT stroke volume 40.87 cm3    LVIDd 3.64 3.5 - 6.0 cm    LV Systolic Volume 31.10 mL    LV Systolic Volume Index 16.4 mL/m2    LVIDs 2.86 2.1 - 4.0 cm    LV Diastolic Volume 55.72 mL    LV ESV A4C 41.29 mL    LV Diastolic Volume Index 29.33 mL/m2    LV EDV A4C 33.45 mL    Left Ventricular End Systolic Volume by Teichholz Method 31.10 mL    Left Ventricular End Diastolic Volume by Teichholz Method 55.72 mL    IVS 1.89 (A) 0.6 - 1.1 cm    LVOT diameter " 1.72 cm    LVOT area 2.3 cm2    FS 21 (A) 28 - 44 %    Left Ventricle Relative Wall Thickness 1.10 cm    Posterior Wall 2.01 (A) 0.6 - 1.1 cm    LV mass 317.12 g    LV Mass Index 167 g/m2    MV Peak E Abraham 0.53 m/s    TDI LATERAL 0.07 m/s    TDI SEPTAL 0.03 m/s    E/E' ratio 10.60 m/s    MV Peak A Abraham 1.50 m/s    TR Max Abraham 3.00 m/s    E/A ratio 0.35     E wave deceleration time 173.74 msec    LV SEPTAL E/E' RATIO 17.67 m/s    LV LATERAL E/E' RATIO 7.57 m/s    LVOT peak abraham 1.10 m/s    Left Ventricular Outflow Tract Mean Velocity 0.78 cm/s    Left Ventricular Outflow Tract Mean Gradient 2.75 mmHg    RV- herrera basal diam 3.6 cm    RV-herrera mid d 3.2 cm    Right ventricular length in diastole (apical 4-chamber view) 3.88 cm    RV-herrera length 3.9 cm    RV mid diameter 3.22 cm    TAPSE 1.23 cm    LA size 3.43 cm    RA Major Axis 3.88 cm    AV mean gradient 6 mmHg    AV peak gradient 13 mmHg    Ao peak abraham 1.77 m/s    Ao VTI 32.40 cm    LVOT peak VTI 17.60 cm    AV valve area 1.26 cm²    AV Velocity Ratio 0.62     AV index (prosthetic) 0.54     EUFEMIA by Velocity Ratio 1.44 cm²    MV stenosis pressure 1/2 time 50.39 ms    MV valve area p 1/2 method 4.37 cm2    Triscuspid Valve Regurgitation Peak Gradient 36 mmHg    PV PEAK VELOCITY 0.68 m/s    PV peak gradient 2 mmHg    Ao root annulus 3.43 cm    IVC diameter 1.88 cm    Mean e' 0.05 m/s    ZLVIDS -1.10     ZLVIDD -3.82     LA area A4C 15.31 cm2    AORTIC VALVE CUSP SEPERATION 1.70 cm    EF 45 %    TV resting pulmonary artery pressure 39 mmHg    RV TB RVSP 6 mmHg    Est. RA pres 3 mmHg    Narrative      Left Ventricle: The left ventricle is smaller than normal. There is   severe concentric hypertrophy. Regional wall motion abnormalities present.    Inferior wall hypokinesis is noted There is mildly reduced systolic   function with a visually estimated ejection fraction of 40 - 50%. Ejection   fraction by visual approximation is 45%. There is diastolic dysfunction.    Right  Ventricle: Normal right ventricular cavity size.    Aortic Valve: The aortic valve is a trileaflet valve. Mildly calcified   cusps.    Tricuspid Valve: There is mild regurgitation. There is mild pulmonary   hypertension.    IVC/SVC: Normal venous pressure at 3 mmHg.    Pericardium: There is a trivial effusion.       , EKG: SR with PACs and nonspecific TWA    , and X-Ray: CXR: X-Ray Chest 1 View (CXR):   X-Ray Chest AP Portable  Order: [id number]  Status: Final result       Visible to patient: No (inaccessible in MyChart)       Next appt: None    0 Result Notes  Details    Reading Physician Reading Date Result Priority   Mohit Bowser II, MD  [phone] 8/25/2024 STAT     Narrative & Impression  EXAMINATION:  XR CHEST AP PORTABLE     CLINICAL HISTORY:  CHF;     COMPARISON:  4 November 2021     TECHNIQUE:  XR CHEST AP PORTABLE     FINDINGS:  The heart and mediastinum are stable in size and configuration.  The pulmonary vascularity is slightly increased with bilateral increased interstitial lung density.  No other lung infiltrates, effusions, pneumothorax or other abnormality is demonstrated.     Impression:     Findings suggest mild cardiac decompensation and / or pneumonitis.        Electronically signed by:Mohit Bowser  Date:                                            08/25/2024  Time:                                           14:21

## 2024-08-26 NOTE — HPI
73 y/o male with PMH of CAD multivessel, CHF, ESRD on dialysis, COPD who presents to [facility] via EMS, [facility] with c/o Weakness and SOB. Patient states he is more short of breath than usual and this started today.  He states he is unable to walk due to his shortness of breath and weakness.  Patient states he is compliant with his medications and that he does not missed dialysis.  Patient also has a complaint of mouth pain.  He points to the left lower gumline to a point of tenderness.  Patient has no teeth at all.  Patient was recently seen at [facility] pulmonology 5 days ago for checkup.  Patient denies headache, chest pain, abdominal pain or lower extremity edema.     Cardiology consulted for NSTEMI. Troponin 569, 553. EKG SR with PACs and nonspecific TWA. Repeat echo pending. CXR with mild cardiac decompensation and / or pneumonitis. Most recent LHC was in 2022 s/p PCI of dist RCA and then in 2021 s/p PCI of LAD & Lcx. Last seen by Dr. Santos in 8/2021. Patient seen while in dialysis today. He denies any chest pain. His main complaint today is mouth pain and difficulty swallowing his food.

## 2024-08-26 NOTE — HPI
74-year-old man with ESRD secondary to hypertension.  He has severe COPD and requires home oxygen.  He presented with shortness of breath.  Chest x-ray shows mild volume overload.  He is also hyperkalemic.

## 2024-08-26 NOTE — ASSESSMENT & PLAN NOTE
Seen during dialysis.  Chest x-ray shows volume overload.  He is also markedly hyperkalemic.  Goal UF 4 L.

## 2024-08-26 NOTE — PROGRESS NOTES
[facility] - [address] [place] [address] Medicine  Progress Note    Patient Name: Michoacano Damon  MRN: [id number]  Patient Class: IP- Inpatient   Admission Date: 8/25/2024  Length of Stay: 1 days  Attending Physician: Joel Sellers MD  Primary Care Provider: Norma, Primary Doctor        Subjective:     Principal Problem:ACS (acute coronary syndrome)        HPI:  Patient is a 74-year-old male who presents to [facility] via EMS, [facility] with c/o Weakness and SOB.  Patient has a past medical history of COPD, CAD, ESRD on dialysis MWF and HFrEF.  Patient states he is more short of breath than usual and this started today.  He states he is unable to walk due to his shortness of breath and weakness.  Patient states he is compliant with his medications and that he does not missed dialysis.  Patient also has a complaint of mouth pain.  He points to the left lower gumline to a point of tenderness.  Patient has no teeth at all.  Patient was recently seen at [facility] pulmonology 5 days ago for checkup.  Patient denies headache, chest pain, abdominal pain or lower extremity edema.    Workup in the ED shows Troponin 569, CXR - Findings suggest mild cardiac decompensation and / or pneumonitis.    Patient will be admitted to [facility] for continued care and medical management.        Overview/Hospital Course:  8/26- Undergoing HD today, cardiology consulted, echocardiogram pending.     Interval History: Patient seen and examined at the bedside, reports feeling short of breath, no chest pain.     Review of Systems   Respiratory:  Positive for shortness of breath.    Cardiovascular:  Negative for chest pain.     Objective:     Vital Signs (Most Recent):  Temp: 97.5 °F (36.4 °C) (08/26/24 0902)  Pulse: 100 (08/26/24 1130)  Resp: 16 (08/26/24 0800)  BP: (!) 119/59 (08/26/24 1130)  SpO2: 99 % (08/26/24 0800) Vital Signs (24h Range):  Temp:  [97.5 °F (36.4  °C)-99.1 °F (37.3 °C)] 97.5 °F (36.4 °C)  Pulse:  [] 100  Resp:  [10-22] 16  SpO2:  [94 %-100 %] 99 %  BP: ()/(34-95) 119/59     Weight: 70.9 kg (156 lb 4.9 oz)  Body mass index is 21.8 kg/m².    Intake/Output Summary (Last 24 hours) at 8/26/2024 1216  Last data filed at 8/26/2024 0656  Gross per 24 hour   Intake 243.87 ml   Output 200 ml   Net 43.87 ml         Physical Exam  Vitals and nursing note reviewed.   Constitutional:       Appearance: He is ill-appearing.   HENT:      Head: Normocephalic and atraumatic.      Nose: Nose normal.      Mouth/Throat:      Mouth: Mucous membranes are moist.   Eyes:      Extraocular Movements: Extraocular movements intact.   Cardiovascular:      Rate and Rhythm: Normal rate and regular rhythm.      Pulses: Normal pulses.      Heart sounds: Normal heart sounds.   Pulmonary:      Effort: Respiratory distress present.      Breath sounds: Rales present.   Abdominal:      General: Bowel sounds are normal.      Palpations: Abdomen is soft.   Musculoskeletal:         General: Normal range of motion.      Cervical back: Normal range of motion.   Skin:     General: Skin is warm.   Neurological:      General: No focal deficit present.      Mental Status: He is alert and oriented to person, place, and time. Mental status is at baseline.   Psychiatric:         Mood and Affect: Mood normal.         Behavior: Behavior normal.             Significant Labs: All pertinent labs within the past 24 hours have been reviewed.    Significant Imaging: I have reviewed all pertinent imaging results/findings within the past 24 hours.    Assessment/Plan:      * ACS (acute coronary syndrome)  Patient presented with SOB, denies chest pain  Troponin trend 569 --> 553.  EKG without acute ST changes.   Echocardiogram pending.  Cardiology consulted.   Heparin drip started, resume aspirin, statin and beta blocker.        Acute respiratory failure with hypoxia and hypercarbia  Patient with Hypercapnic  and Hypoxic Respiratory failure which is Acute on chronic.  he is on home oxygen at 2 LPM. Supplemental oxygen was provided and noted- Oxygen Concentration (%):  [32] 32    .   Signs/symptoms of respiratory failure include- tachypnea and respiratory distress. Contributing diagnoses includes - CHF and COPD Labs and images were reviewed. Patient Has recent ABG, which has been reviewed. Will treat underlying causes and adjust management of respiratory failure as follows-     Continue volume removal with HD, steroids/antibiotics.   Wean O2 as able.     ESRD (end stage renal disease)  Presented with volume overload.   Creatine stable for now. BMP reviewed- noted Estimated Creatinine Clearance: 7.4 mL/min (A) (based on SCr of 8.67 mg/dL (H)). according to latest data. Based on current GFR, CKD stage is end stage.  Monitor UOP and serial BMP and adjust therapy as needed. Renally dose meds. Avoid nephrotoxic medications and procedures.  Dialysis Monday, Wednesday, Friday  Nephrology consulted, appreciate assistance, undergoing urgent HD now.    Acute on chronic HFrEF (heart failure with reduced ejection fraction)  Patient is identified as having Systolic (HFrEF) heart failure that is Chronic. CHF is currently uncontrolled due to >3 pillow orthopnea, Rales/crackles on pulmonary exam, and Pulmonary edema/pleural effusion on CXR. Latest ECHO performed and demonstrates- Results for orders placed during the hospital encounter of 03/19/21    Echo Color Flow Doppler? Yes    Interpretation Summary  · The left ventricle is normal in size with moderate concentric hypertrophy and low normal systolic function. The estimated ejection fraction is 50%  · There is pulmonary hypertension.  · There is right ventricular hypertrophy.  · Normal right ventricular size with normal right ventricular systolic function.  · Normal central venous pressure (3 mmHg).  · The estimated PA systolic pressure is 70 mmHg.  · Differential include hypertensive  "heart disease vs. Cardiac amyloidosis - recommend clinical correlation  . Continue Beta Blocker and monitor clinical status closely. Monitor on telemetry. Patient is off CHF pathway.  Monitor strict Is&Os and daily weights.  Place on fluid restriction of 1.5 L. Cardiology has not been consulted. Continue to stress to patient importance of self efficacy and  on diet for CHF. Last BNP reviewed- and noted below No results for input(s): "BNP", "BNPTRIAGEBLO" in the last 168 hours.   Volume removal with HD.       Chronic obstructive pulmonary disease with acute exacerbation  On O2 at baseline.  Patient's COPD is with exacerbation noted by continued dyspnea and use of accessory muscles for breathing currently.    Patient is currently off COPD Pathway.   Continue scheduled inhalers, antibiotics Steroids and Supplemental oxygen and monitor respiratory status closely.     CAD (coronary artery disease)  Patient with known CAD s/p  unknown, patient on Brilinta , which is uncontrolled as suspect ACS on presentation.  Will continue ASA and Statin and monitor for S/Sx of angina/ACS. Continue to monitor on telemetry.     Hypertension  Chronic, uncontrolled. Latest blood pressure and vitals reviewed-     Temp:  [97.5 °F (36.4 °C)-99.1 °F (37.3 °C)]   Pulse:  []   Resp:  [10-22]   BP: ()/(34-95)   SpO2:  [94 %-100 %] .   Home meds for hypertension were reviewed and noted below.   Hypertension Medications               carvediloL (COREG) 12.5 MG tablet Take 12.5 mg by mouth 2 (two) times daily.    hydrALAZINE (APRESOLINE) 100 MG tablet Take 1 tablet (100 mg total) by mouth 3 (three) times daily.            While in the hospital, will manage blood pressure as follows; Continue home antihypertensive regimen    Will utilize p.r.n. blood pressure medication only if patient's blood pressure greater than 180/110 and he develops symptoms such as worsening chest pain or shortness of breath.      VTE Risk Mitigation (From " admission, onward)           Ordered     heparin 25,000 units in dextrose 5% 250 mL (100 units/mL) infusion LOW INTENSITY nomogram - [facility]  Continuous        Question:  Begin at (units/kg/hr)  Answer:  12    08/25/24 2044     heparin 25,000 units in dextrose 5% (100 units/ml) IV bolus from bag LOW INTENSITY nomogram - [facility]  As needed (PRN)        Question:  Heparin Infusion Adjustment (DO NOT MODIFY ANSWER)  Answer:  \\[email or web address]\epic\Images\Pharmacy\HeparinInfusions\heparin LOW INTENSITY nomogram for [facility] [id number].pdf    08/25/24 2044     heparin 25,000 units in dextrose 5% (100 units/ml) IV bolus from bag LOW INTENSITY nomogram - [facility]  As needed (PRN)        Question:  Heparin Infusion Adjustment (DO NOT MODIFY ANSWER)  Answer:  \\[email or web address]\epic\Images\Pharmacy\HeparinInfusions\heparin LOW INTENSITY nomogram for [facility] [id number]    08/25/24 2044     IP VTE HIGH RISK PATIENT  Once         08/25/24 1847     Place sequential compression device  Until discontinued         08/25/24 1847                    Discharge Planning   MARCIAL: 8/28/2024     Code Status: Full Code   Is the patient medically ready for discharge?:     Reason for patient still in hospital (select all that apply): Patient trending condition and Consult recommendations                     LEV HERNANDEZ MD  Department of Hospital Medicine   [facility] - [address] [facility] Tele[facility]

## 2024-08-26 NOTE — SUBJECTIVE & OBJECTIVE
Past Medical History:   Diagnosis Date    Anticoagulant long-term use     COPD (chronic obstructive pulmonary disease)     Coronary artery disease     ESRD on dialysis     HFrEF (heart failure with reduced ejection fraction) 2021    Hypertension        Past Surgical History:   Procedure Laterality Date    AV FISTULA PLACEMENT, BRACHIOCEPHALIC      INSERTION OF DIALYSIS CATHETER         Review of patient's allergies indicates:  No Known Allergies  Current Facility-Administered Medications   Medication Frequency    0.9%  NaCl infusion PRN    acetaminophen tablet 650 mg Q4H PRN    albuterol-ipratropium 2.5 mg-0.5 mg/3 mL nebulizer solution 3 mL Q6H    aspirin EC tablet 81 mg Daily    atorvastatin tablet 80 mg Daily    budesonide nebulizer solution 0.5 mg BID    carvediloL tablet 12.5 mg BID    dextrose 10% bolus 125 mL 125 mL PRN    dextrose 10% bolus 250 mL 250 mL PRN    glucagon (human recombinant) injection 1 mg PRN    glucose chewable tablet 16 g PRN    glucose chewable tablet 24 g PRN    heparin 25,000 units in dextrose 5% (100 units/ml) IV bolus from bag LOW INTENSITY nomogram - RUSH PRN    heparin 25,000 units in dextrose 5% (100 units/ml) IV bolus from bag LOW INTENSITY nomogram - RUSH PRN    heparin 25,000 units in dextrose 5% 250 mL (100 units/mL) infusion LOW INTENSITY nomogram - RUSH Continuous    hydrALAZINE tablet 50 mg TID    HYDROcodone-acetaminophen  mg per tablet 1 tablet Q8H PRN    [START ON 8/27/2024] levoFLOXacin 500 mg/100 mL IVPB 500 mg Q48H    melatonin tablet 6 mg Nightly PRN    methylPREDNISolone sodium succinate injection 40 mg Q6H    montelukast tablet 10 mg QHS    mupirocin 2 % ointment BID    naloxone 0.4 mg/mL injection 0.02 mg PRN    ondansetron injection 4 mg Q8H PRN    sevelamer carbonate tablet 800 mg TID WM    sodium chloride 0.9% flush 10 mL Q12H PRN    ticagrelor tablet 90 mg BID     Family History       Problem Relation (Age of Onset)    Stroke Mother          Tobacco Use     Smoking status: Every Day     Current packs/day: 1.00     Average packs/day: 1 pack/day for 15.0 years (15.0 ttl pk-yrs)     Types: Cigarettes    Smokeless tobacco: Never   Substance and Sexual Activity    Alcohol use: Not Currently    Drug use: Not Currently    Sexual activity: Not on file     Review of Systems   Constitutional:  Positive for appetite change. Negative for fever.   HENT:  Positive for dental problem.    Eyes:  Negative for visual disturbance.   Respiratory:  Positive for cough, shortness of breath and wheezing.    Cardiovascular:  Negative for chest pain, palpitations and leg swelling.   Gastrointestinal:  Negative for abdominal pain, diarrhea and nausea.   Genitourinary:  Negative for dysuria and hematuria.     Objective:     Vital Signs (Most Recent):  Temp: 97.8 °F (36.6 °C) (08/26/24 0712)  Pulse: 90 (08/26/24 0800)  Resp: 16 (08/26/24 0800)  BP: (!) 152/76 (08/26/24 0712)  SpO2: 99 % (08/26/24 0800) Vital Signs (24h Range):  Temp:  [97.8 °F (36.6 °C)-99.1 °F (37.3 °C)] 97.8 °F (36.6 °C)  Pulse:  [] 90  Resp:  [10-22] 16  SpO2:  [94 %-100 %] 99 %  BP: (112-164)/(60-95) 152/76     Weight: 70.9 kg (156 lb 4.9 oz) (08/26/24 0200)  Body mass index is 21.8 kg/m².  Body surface area is 1.88 meters squared.    I/O last 3 completed shifts:  In: 243.9 [I.V.:104.8; IV Piggyback:139.1]  Out: 200 [Urine:200]     Physical Exam  Constitutional:       General: He is not in acute distress.  HENT:      Head: Normocephalic and atraumatic.   Eyes:      Pupils: Pupils are equal, round, and reactive to light.   Cardiovascular:      Rate and Rhythm: Normal rate and regular rhythm.      Heart sounds: No murmur heard.     No friction rub. No gallop.   Pulmonary:      Effort: Respiratory distress present.      Breath sounds: Wheezing present. No rales.   Abdominal:      General: Bowel sounds are normal.      Tenderness: There is no abdominal tenderness. There is no guarding.   Musculoskeletal:      Right lower  leg: No edema.      Left lower leg: No edema.   Neurological:      Mental Status: He is alert and oriented to person, place, and time.   Psychiatric:         Behavior: Behavior normal.          Significant Labs:  BMP:   Recent Labs   Lab 08/25/24  1513 08/26/24  0447   GLU 99 91    136   K 5.9* 7.2*    106   CO2 25 24   BUN 58* 72*   CREATININE 7.96* 8.67*   CALCIUM 8.6 8.9   MG 2.8*  --      CBC:   Recent Labs   Lab 08/26/24  0447   WBC 6.66   RBC 3.11*   HGB 9.2*   HCT 29.9*   PLT 66*   MCV 96.1*   MCH 29.6   MCHC 30.8*       Significant Imaging:

## 2024-08-26 NOTE — ASSESSMENT & PLAN NOTE
Creatine stable for now. BMP reviewed- noted Estimated Creatinine Clearance: 8.5 mL/min (A) (based on SCr of 7.96 mg/dL (H)). according to latest data. Based on current GFR, CKD stage is end stage.  Monitor UOP and serial BMP and adjust therapy as needed. Renally dose meds. Avoid nephrotoxic medications and procedures.  Dialysis Monday, Wednesday, Friday  Nephrology consulted, appreciate assistance

## 2024-08-26 NOTE — ASSESSMENT & PLAN NOTE
- Potassium 7.2 this morning, currently being dialyzed  - Being followed by primary team    8/27/2024:  - Potassium 6.0 today; Lokelma ordered by primary team and dialysis tomorrow

## 2024-08-26 NOTE — ASSESSMENT & PLAN NOTE
On O2 at baseline.  Patient's COPD is with exacerbation noted by continued dyspnea and use of accessory muscles for breathing currently.    Patient is currently off COPD Pathway.   Continue scheduled inhalers, antibiotics Steroids and Supplemental oxygen and monitor respiratory status closely.

## 2024-08-26 NOTE — NURSING
DIALYSIS TREATMENT SUMMARY       Note: Consult with the attending physician for patient treatment orders, this document is not a physician order.      Patient Information   Patient Michoacano Damon   Date of Birth July 07, 1950   Chart Number [id number]   Location [facility], [place]   Location MRN [id number]   Gender Male   SSN (last 4) 182     Treatment Information   Treatment Type Hemodialysis   Treatment Id [id number]   Start Time August 26, 2024 09:10   End Time August 26, 2024 12:10   Acutal Duration 03:00     Treatment Balances   Total Saline Administered 500   Net Fluid Balance 1324    Hemodialysis Orders   Therapy Standard   Orders Verified Time 08/26/2024 08:50    Date Verified 08/26/2024   Duration 3:00   Isolated UF/Bypass No   BFR (mL) 500   DFR X1.5 BFR   Dialyzer Type OPTIFLUX 160NR   UF Order UF Goal Ordered   UF Goal Ordered (mL) 3500   Crit-Line used No   Heparin Initial Units Bolus No   Heparin IV Maintenance Bolus No   Heparin IV Infusion No   Potassium (mEq/L) 1   Calcium (mEq/L) 2.5   Bicarb (mg/dL) 33   Sodium (mEq/L) 137   Additional Orders 1 K+ x 1 hr, 2 K+ x 2 hrs   Clinician Francesca Haas RN    Dialysis Access   Access Type AV Access   AV Access   Access Location Forearm - R   Needle 15g   Bruit Yes   Thrill Yes   Notes Needles placed without difficulty.      Vitals   Pre-Treatment Vitals   Time Is BP being recorded? Pre BP Map BP Method Pulse RR Temp How was Weight Obtained? Pre Weight Previous Dry Weight Previous Post Weight Metric Target Fluid Removal (mL) Dialysate Confirmed Clinician    08/26/2024 09:02 BP/Map 142/70 (94) Noninvasive 96 22 97.5 How Obtained: Bed Scale 71.5   Kgs 3500 Yes Francesca Haas RN    Comments: Rec'd via bed per hospital transport.      Post Treatment Vitals   Time Is BP being recorded? BP Map Pulse RR Temp How was Weight Obtained? Post Weight Metric BVP UF Goal Ordered NSS Given Intra-Procedure Total Machine UF Removed (mL) Crit-Line Ending Profile Crit-Line  Refill Crit-Line Ending HCT Crit-Line Max BV% Clinician    08/26/2024 12:15 BP/Map 147/71 (96) 97 18 97.6 How Obtained: Bed scale 70 Kgs  3500 0 1824     Francesca Haas RN    Comments:       Safety checks include: access uncovered and secured, Hemaclip secured for all central line access, machine checks performed, and alarm limits confirmed.     Labs   Hepatitis   HBsAG Lab Result HBsAG Lab Result HBsAG Draw Date Transient Antigenemia(MD Diagnosis Only) Anti-HBs Lab Result Anti-HBs Lab Value Anti-HBs Draw Date Documented By Documented Date Hepatitis Status Hepatitis Status   Negative  08/12/2024  Unknown   Francesca Haas 08/26/2024  Susceptible   Notes:       Pre-Treatment Hepatitis Precautions Hard copy verified by nurse and placed in patients hospital chart Yes Signing   Patient tx outside buffer zone Yes Hepatitis Information Entered By Francesca Haas RN Signed By Francesca Haas RN     Pre-Treatment Handoff   Staff Report Received Yes   Report Given by Primary Nurse Cale Mcarthur   Time 08:45     Patient Arrival   Patient ID Verified Date of Birth    MRN    Full Name   Patient Consent to treatment verified Yes   Blood Transfusion Consent Verified N/A     Treatment Comments   Treatment Notes Tolerated without difficulty. Unable to pull ordered amount due to asymptomatic hypotension. Dr Herrera aware.     Post-Treatment Handoff   Report Given to Primary Nurse Cale Mcarthur   Time Report Given 14:45   Report Given By Francesca Haas RN    Machine Validation   Time 10:11   Date 8/26/2024   Auto Alarm Test Passed Yes   Machine Serial # [id number]   Portable RO Yes   RO Serial# [id number]   Residual Bleach Negative Yes   Was a manufactured mix used? No   Machine Log Completed Yes   Total Chlorine (less than 0.1)? Yes   Total Chlorine Log Completed Yes   Bicarb BiBag   Bibag Size 650   Machine Temp 37   Machine Conductivity 14   Meter Type N/A   Meter Conductivity    Independent Conductivity 13.7   pH Status Pass Pass   pH    TCD  Value    TCD Alarm with +/- 0.5    NVL enabled validated 100 asymmetric Yes   Safety check complete Francesca Haas RN   Second Verification Performed? Yes   Second Verification Performed By Francesca Haas RN   Reason Not Verified       Serum Lab Values   Time BUN Creatinine Na K (mEq/L) Cl CO2 Ca (mEq/L) Phos Mg (mg/dL) Alb (g/dL) Glucose Hgb Hct WBC Plt PT aPTT INR Other Clinician    08/26/2024 04:47 72 8.67 136 7.2  24 8.9     9.2 29.9 6.66      Francesca Haas RN    Comments:         Facility Information Location Dialysis Suite Multi Isolation Information   Facility Information Patient Type Chronic dialysis patient with diagnosis of ESRD Isolation Required? N   Admission Date 08/25/2024 Patient Chronic Unit [facility] Completed by   Ordering MD Dr. Herrera Code Status Full Code General Tx information Entered by Francesca Haas RN   Account/Finance Number [id number] Diagnosis    Admission Status InPatient Diagnosis Acute coronary syndrome      Start Treatment Time Out Completed 09:06 Treatment Start Date 08/26/2024   Treatment Initiation Connections Secured Correct patient verified Yes Treatment Start Time 09:10    Saline line double clamped Correct procedure verified Yes Patient/Family questions and concerns addressed Yes   Time Out Confirmed by EVA Haas Correct access site verified Yes      Pre Focused Assessment Notes Obvious sob. O2 via NC @ 2L/M. LOC   Access Edema LOC Alert and Oriented x3   Signs and Symptoms of Infection? No Location Facial    Pain Screening  Upper Ext.  Voids   Does the patient have pain? No Cardiac Completed by   Respiratory Heart Rhythm Regular Pre Treatment Focused Assessment Completed By Francesca Haas RN   Lung Sounds Coarse Telemetry No Time 09:06   Location Bilateral Skin Signing   Position Anterior Skin Warm Signed By Francesca Haas RN   Respiratory Efforts Labored  Dry      Education  Fluid Intake Focus or Topic Hemodialysis treatment review   Patient Education Method Knowledge /  Understanding Assessed Teach back Method Knowledge / Understanding Assessed Teach back   Patient Educated? Yes Family Education Provided? N/A Patient Education Introduced By   Focus or Topic Diet and/or exercise Caregiver Education Provided? Yes Patient Education Introduced By Francesca Haas RN     Post-Treatment Delay Note Post tx bleeding time 2 hrs. Currently receiving iv heparin et received heparin bolus per hospital RN during dialysis tx. Dr Herrera notified. No new orders. Pressure applied manually until hemostasis achieved. Gauze dsg applied. Notes Needles removed et pressure applied x 2 hrs until hemostasis achieved. Gauze dsg applied. Dr Herrera notified . 1324 mls net fluid removed.   Post Treatment Delay Machine Disinfection Requirement Completed by   Delay Y HD machine external disinfection completed per policy Yes Post Treatment Form Completed By Francesca Haas RN   Delay Type Delay Due to Patient PRO external disinfection completed per policy Yes    Delay Hours 2.00 Post Treatment General Information      Post Focused Assessment Location Bilateral  Dry   Changes from Pre Focused Assessment Position Anterior LOC   Changes from Pre Treatment Focused Assessment? No Respiratory Efforts Labored LOC Alert and Oriented x3   Access Notes Obvious sob. O2 via NC @ 2L/M.    Bruit Yes Edema  Voids   Thrill Yes Location Facial Completed by   Access Flow Good  Upper Ext. Post Treatment Focused Assessment Completed by Francesca Haas RN   Dialyzer Clearance Streaked Cardiac Date 08/26/2024   Pain Screening Heart Rhythm Regular Time 12:45   Does the patient have pain? No Telemetry No Signing   Respiratory Skin Signed By Francesca Haas RN   Lung Sounds Coarse Skin Warm

## 2024-08-26 NOTE — PLAN OF CARE
Problem: Adult Inpatient Plan of Care  Goal: Plan of Care Review  Outcome: Progressing  Flowsheets (Taken 8/25/2024 2124)  Plan of Care Reviewed With: patient  Goal: Optimal Comfort and Wellbeing  Outcome: Progressing  Intervention: Monitor Pain and Promote Comfort  Flowsheets (Taken 8/25/2024 2124)  Pain Management Interventions:   pain management plan reviewed with patient/caregiver   position adjusted   pillow support provided   quiet environment facilitated   relaxation techniques promoted  Intervention: Provide Person-Centered Care  Flowsheets (Taken 8/25/2024 2124)  Trust Relationship/Rapport:   care explained   choices provided   emotional support provided   empathic listening provided   questions answered   questions encouraged   reassurance provided   thoughts/feelings acknowledged     Problem: Skin Injury Risk Increased  Goal: Skin Health and Integrity  Outcome: Progressing  Intervention: Optimize Skin Protection  Flowsheets (Taken 8/25/2024 2124)  Pressure Reduction Techniques:   frequent weight shift encouraged   heels elevated off bed   positioned off wounds   pressure points protected   rest period provided between sit times   weight shift assistance provided  Pressure Reduction Devices: positioning supports utilized  Skin Protection:   incontinence pads utilized   transparent dressing maintained  Head of Bed (HOB) Positioning: HOB elevated  Intervention: Promote and Optimize Oral Intake  Flowsheets (Taken 8/25/2024 2124)  Oral Nutrition Promotion:   physical activity promoted   rest periods promoted   social interaction promoted   adaptive equipment use encouraged

## 2024-08-26 NOTE — PROGRESS NOTES
Pharmacist Renal Dose Adjustment Note    Michoacano Damon is a 74 y.o. male being treated with the medication levaquin    Patient Data:    Vital Signs (Most Recent):  Temp: 97.8 °F (36.6 °C) (08/26/24 0712)  Pulse: 90 (08/26/24 0800)  Resp: 16 (08/26/24 0800)  BP: (!) 152/76 (08/26/24 0712)  SpO2: 99 % (08/26/24 0800) Vital Signs (72h Range):  Temp:  [97.8 °F (36.6 °C)-99.1 °F (37.3 °C)]   Pulse:  []   Resp:  [10-22]   BP: (112-164)/(60-95)   SpO2:  [94 %-100 %]      Recent Labs   Lab 08/20/24  1005 08/25/24  1513 08/26/24  0447   CREATININE 7.29* 7.96* 8.67*     Serum creatinine: 8.67 mg/dL (H) 08/26/24 0447  Estimated creatinine clearance: 7.4 mL/min (A)    Medication:levaquin dose: 750 mg frequency qd will be changed to medication:levaquin dose:500 mg frequency:q48 hrs    Pharmacist's Name: Kacie Horn  Pharmacist's Extension: [phone]

## 2024-08-26 NOTE — ASSESSMENT & PLAN NOTE
- Patient seen and evaluated by Dr. Trujillo  - Previously followed by Dr. Santos, last seen 8/2021; LHC 2022 s/p stent RCA and 2020 s/p stents LAD & Lcx  - Troponin 569, 553; flat  - EKG SR with PAC and nonspecific TWA  - Repeat echo pending  - This is likely Type 2 MI in setting of ESRD on dialysis and heart failure, however we will review echo for any new abnormalities and decide if LHC may be warranted  - Further recommendations to follow

## 2024-08-26 NOTE — H&P
[facility] - Emergency Department  Hospital Medicine  History & Physical    Patient Name: Michoacano Damon  MRN: [id number]  Patient Class: IP- Inpatient  Admission Date: 8/25/2024  Attending Physician: David Vega MD   Primary Care Provider: Norma Primary Doctor         Patient information was obtained from patient, past medical records, and ER records.     Subjective:     Principal Problem:ACS (acute coronary syndrome)    Chief Complaint:   Chief Complaint   Patient presents with    Fatigue    Oral Pain     Pt presents to ED via [facility] from [facility] with c/o fatigue and left sided oral pain. Pt reports hx heart problems, COPD, and kidney problems. Pt reports last dialysis treatment on Friday.         HPI: Patient is a 74-year-old male who presents to [facility] via EMS, [facility] with c/o Weakness and SOB.  Patient has a past medical history of COPD, CAD, ESRD on dialysis MWF and HFrEF.  Patient states he is more short of breath than usual and this started today.  He states he is unable to walk due to his shortness of breath and weakness.  Patient states he is compliant with his medications and that he does not missed dialysis.  Patient also has a complaint of mouth pain.  He points to the left lower gumline to a point of tenderness.  Patient has no teeth at all.  Patient was recently seen at [facility] pulmonology 5 days ago for checkup.  Patient denies headache, chest pain, abdominal pain or lower extremity edema.    Workup in the ED shows Troponin 569, CXR - Findings suggest mild cardiac decompensation and / or pneumonitis.    Patient will be admitted to [facility] for continued care and medical management.        Past Medical History:   Diagnosis Date    Anticoagulant long-term use     COPD (chronic obstructive pulmonary disease)     Coronary artery disease     ESRD on dialysis     HFrEF (heart failure with reduced ejection fraction) 2021     Hypertension        Past Surgical History:   Procedure Laterality Date    AV FISTULA PLACEMENT, BRACHIOCEPHALIC      INSERTION OF DIALYSIS CATHETER         Review of patient's allergies indicates:  No Known Allergies    No current facility-administered medications on file prior to encounter.     Current Outpatient Medications on File Prior to Encounter   Medication Sig    albuterol-ipratropium (DUO-NEB) 2.5 mg-0.5 mg/3 mL nebulizer solution Take 3 mLs by nebulization every 6 (six) hours as needed for Wheezing. Rescue    arformoteroL (BROVANA) 15 mcg/2 mL Nebu Take 15 mcg by nebulization 2 (two) times a day. Controller    aspirin (ECOTRIN) 81 MG EC tablet Take 81 mg by mouth once daily.    budesonide (PULMICORT) 0.5 mg/2 mL nebulizer solution Take 0.5 mg by nebulization 2 (two) times a day.    carvediloL (COREG) 12.5 MG tablet Take 12.5 mg by mouth 2 (two) times daily.    hydrALAZINE (APRESOLINE) 100 MG tablet Take 1 tablet (100 mg total) by mouth 3 (three) times daily.    HYDROcodone-acetaminophen (NORCO)  mg per tablet Take 1 tablet by mouth 3 (three) times daily as needed for pain    montelukast (SINGULAIR) 10 mg tablet Take 1 tablet (10 mg total) by mouth every evening.    olopatadine (PATANOL) 0.1 % ophthalmic solution Place 1 drop into both eyes once daily.    predniSONE (DELTASONE) 20 MG tablet Take 1 tablet (20 mg total) by mouth 2 (two) times daily with meals.    ROFLUMILAST ORAL Take 500 mcg by mouth Daily.    rosuvastatin (CRESTOR) 20 MG tablet Take 1 tablet (20 mg total) by mouth nightly for cholesterol.    sevelamer carbonate (RENVELA) 800 mg Tab Take 800 mg by mouth 3 (three) times daily with meals.    ticagrelor (BRILINTA) 90 mg tablet Take 1 tablet (90 mg total) by mouth 2 (two) times a day.    [DISCONTINUED] budesonide-formoterol 160-4.5 mcg (SYMBICORT) 160-4.5 mcg/actuation HFAA Use as directed, inhaling two (2) puffs by mouth twice a day.    [DISCONTINUED] albuterol (PROVENTIL/VENTOLIN HFA)  90 mcg/actuation inhaler Inhale 2 puffs into the lungs every 6 (six) hours as needed for Wheezing. Rescue    [DISCONTINUED] albuterol (VENTOLIN HFA) 90 mcg/actuation inhaler Use as directed, inhaling one (1) to two (2) puffs by mouth every 4 hours as needed.    [DISCONTINUED] allopurinoL (ZYLOPRIM) 100 MG tablet Take 100 mg by mouth once daily.    [DISCONTINUED] allopurinoL (ZYLOPRIM) 100 MG tablet Allopurinol 100 MG Oral Tablet QTY: 30  Days: 0 Refills: 5  Written: 06/25/20 Patient Instructions: allopurinol 100 mg oral tablet Dispense: 30 - TAKE 1 TABLET BY MOUTH DAILY Tablet    [DISCONTINUED] allopurinoL (ZYLOPRIM) 100 MG tablet Take 1 tablet (100 mg total) by mouth once daily.    [DISCONTINUED] amLODIPine (NORVASC) 5 MG tablet Take 5 mg by mouth once daily.    [DISCONTINUED] azithromycin (Z-NITZA) 250 MG tablet Take 2 tablets by mouth on day one, then take 1 tablet by mouth daily days 2-5.    [DISCONTINUED] carvediloL (COREG) 12.5 MG tablet Take 12.5 mg by mouth 2 (two) times daily.    [DISCONTINUED] carvediloL (COREG) 12.5 MG tablet Take 1 tablet (12.5 mg total) by mouth 2 (two) times a day.    [DISCONTINUED] cloNIDine (CATAPRES) 0.1 MG tablet Take 0.1 mg by mouth.    [DISCONTINUED] clopidogreL (PLAVIX) 75 mg tablet Take 1 tablet (75 mg total) by mouth once daily.    [DISCONTINUED] hydrALAZINE (APRESOLINE) 100 MG tablet Take 100 mg by mouth 3 (three) times daily.    [DISCONTINUED] HYDROcodone-acetaminophen (NORCO)  mg per tablet TAKE 1 TABLET BY MOUTH 2 TO 3 TIMES A DAY WITH FOOD AS NEEDED FOR PAIN CAUSES DROWSINESS-AVOID ALCOHOL!!    [DISCONTINUED] HYDROcodone-acetaminophen (NORCO)  mg per tablet Take 1 tablet by mouth 3 (three) times daily as needed for pain .. May cause drowsiness    [DISCONTINUED] linezolid (ZYVOX) 600 mg Tab Take 1 tablet (600 mg total) by mouth every 12 (twelve) hours. for 7 days    [DISCONTINUED] pantoprazole (PROTONIX) 40 MG tablet Take 40 mg by mouth once daily.     [DISCONTINUED] pravastatin (PRAVACHOL) 40 MG tablet Take 40 mg by mouth every evening.    [DISCONTINUED] predniSONE (DELTASONE) 20 MG tablet Take 2 tablets (40 mg total) by mouth 2 (two) times daily with meals.    [DISCONTINUED] theophylline (ADELSO-24) 400 mg 24 hr capsule take 400 mg by mouth daily    [DISCONTINUED] tiotropium bromide (SPIRIVA RESPIMAT) 2.5 mcg/actuation inhaler Inhale 2 puffs into the lungs Daily. Controller     Family History       Problem Relation (Age of Onset)    Stroke Mother          Tobacco Use    Smoking status: Every Day     Current packs/day: 1.00     Average packs/day: 1 pack/day for 15.0 years (15.0 ttl pk-yrs)     Types: Cigarettes    Smokeless tobacco: Never   Substance and Sexual Activity    Alcohol use: Not Currently    Drug use: Not Currently    Sexual activity: Not on file     Review of Systems   Constitutional:  Positive for activity change, appetite change and fatigue. Negative for fever and unexpected weight change.   HENT:  Positive for dental problem and mouth sores. Negative for ear pain, nosebleeds, postnasal drip, rhinorrhea, sinus pain, sneezing and trouble swallowing.    Eyes:  Positive for discharge and visual disturbance. Negative for pain and itching.   Respiratory:  Positive for cough, shortness of breath and wheezing. Negative for apnea, choking, chest tightness and stridor.    Cardiovascular:  Negative for chest pain, palpitations and leg swelling.   Gastrointestinal:  Negative for abdominal distention, diarrhea, nausea and vomiting.   Endocrine: Negative for cold intolerance and heat intolerance.   Genitourinary:  Positive for decreased urine volume. Negative for difficulty urinating, dysuria, hematuria and urgency.   Musculoskeletal:  Positive for back pain. Negative for arthralgias, neck pain and neck stiffness.   Skin:  Negative for rash and wound.   Allergic/Immunologic: Negative for environmental allergies and food allergies.   Neurological:  Positive for  weakness. Negative for tremors, syncope, facial asymmetry, light-headedness, numbness and headaches.   Psychiatric/Behavioral:  Negative for behavioral problems, dysphoric mood and hallucinations. The patient is not nervous/anxious and is not hyperactive.    All other systems reviewed and are negative.    Objective:     Vital Signs (Most Recent):  Temp: 98.4 °F (36.9 °C) (08/25/24 1400)  Pulse: 95 (08/25/24 1831)  Resp: 15 (08/25/24 1831)  BP: (!) 160/93 (08/25/24 1831)  SpO2: 100 % (08/25/24 1831) Vital Signs (24h Range):  Temp:  [98.4 °F (36.9 °C)] 98.4 °F (36.9 °C)  Pulse:  [] 95  Resp:  [11-19] 15  SpO2:  [98 %-100 %] 100 %  BP: (132-164)/(60-93) 160/93     Weight: 80.7 kg (178 lb)  Body mass index is 24.83 kg/m².     Physical Exam  Vitals and nursing note reviewed.   Constitutional:       General: He is awake. He is not in acute distress.     Appearance: Normal appearance. He is well-developed, well-groomed and normal weight. He is ill-appearing. He is not toxic-appearing or diaphoretic.      Interventions: Nasal cannula in place.   HENT:      Head: Normocephalic and atraumatic.      Mouth/Throat:      Mouth: Mucous membranes are moist. Oral lesions present.      Dentition: Abnormal dentition.      Pharynx: Oropharynx is clear.     Eyes:      General:         Right eye: Discharge present.         Left eye: Discharge present.  Cardiovascular:      Rate and Rhythm: Normal rate and regular rhythm.      Pulses: Normal pulses.      Heart sounds: Normal heart sounds. No murmur heard.  Pulmonary:      Breath sounds: Wheezing and rhonchi present.   Abdominal:      General: There is no distension.      Palpations: Abdomen is soft.      Tenderness: There is no abdominal tenderness.   Musculoskeletal:        Arms:       Right lower leg: No edema.      Left lower leg: No edema.   Skin:     General: Skin is warm and dry.   Neurological:      Mental Status: He is alert and oriented to person, place, and time.    Psychiatric:         Mood and Affect: Mood normal.         Behavior: Behavior normal. Behavior is cooperative.         Thought Content: Thought content normal.                Significant Labs: All pertinent labs within the past 24 hours have been reviewed.    Significant Imaging: I have reviewed all pertinent imaging results/findings within the past 24 hours.  Assessment/Plan:     * ACS (acute coronary syndrome)  Patient SOB, denies chest pain  Troponin trend 569, 2nd pending  Cardiac monitoring  Aspirin, statin, beta-blocker  ProBNP 14,404  Echo ordered  Brilinta 90 mg BID  We will continue to monitor      ESRD (end stage renal disease)  Creatine stable for now. BMP reviewed- noted Estimated Creatinine Clearance: 8.5 mL/min (A) (based on SCr of 7.96 mg/dL (H)). according to latest data. Based on current GFR, CKD stage is end stage.  Monitor UOP and serial BMP and adjust therapy as needed. Renally dose meds. Avoid nephrotoxic medications and procedures.  Dialysis Monday, Wednesday, Friday  Nephrology consulted, appreciate assistance    HFrEF (heart failure with reduced ejection fraction)  Patient is identified as having Systolic (HFrEF) heart failure that is Chronic. CHF is currently uncontrolled due to >3 pillow orthopnea, Rales/crackles on pulmonary exam, and Pulmonary edema/pleural effusion on CXR. Latest ECHO performed and demonstrates- Results for orders placed during the hospital encounter of 03/19/21    Echo Color Flow Doppler? Yes    Interpretation Summary  · The left ventricle is normal in size with moderate concentric hypertrophy and low normal systolic function. The estimated ejection fraction is 50%  · There is pulmonary hypertension.  · There is right ventricular hypertrophy.  · Normal right ventricular size with normal right ventricular systolic function.  · Normal central venous pressure (3 mmHg).  · The estimated PA systolic pressure is 70 mmHg.  · Differential include hypertensive heart disease  "vs. Cardiac amyloidosis - recommend clinical correlation  . Continue Beta Blocker and monitor clinical status closely. Monitor on telemetry. Patient is off CHF pathway.  Monitor strict Is&Os and daily weights.  Place on fluid restriction of 1.5 L. Cardiology has not been consulted. Continue to stress to patient importance of self efficacy and  on diet for CHF. Last BNP reviewed- and noted below No results for input(s): "BNP", "BNPTRIAGEBLO" in the last 168 hours.       CAD (coronary artery disease)  Patient with known CAD s/p  unknown, patient on Brilinta , which is uncontrolled Will continue ASA and Statin and monitor for S/Sx of angina/ACS. Continue to monitor on telemetry.     COPD (chronic obstructive pulmonary disease)  Patient's COPD is with exacerbation noted by continued dyspnea and use of accessory muscles for breathing currently.  Patient is currently off COPD Pathway. Continue scheduled inhalers Steroids and Supplemental oxygen and monitor respiratory status closely.     Hypertension  Chronic, uncontrolled. Latest blood pressure and vitals reviewed-     Temp:  [98.4 °F (36.9 °C)]   Pulse:  []   Resp:  [11-19]   BP: (132-164)/(60-93)   SpO2:  [98 %-100 %] .   Home meds for hypertension were reviewed and noted below.   Hypertension Medications               carvediloL (COREG) 12.5 MG tablet Take 12.5 mg by mouth 2 (two) times daily.    hydrALAZINE (APRESOLINE) 100 MG tablet Take 1 tablet (100 mg total) by mouth 3 (three) times daily.            While in the hospital, will manage blood pressure as follows; Continue home antihypertensive regimen    Will utilize p.r.n. blood pressure medication only if patient's blood pressure greater than 180/110 and he develops symptoms such as worsening chest pain or shortness of breath.      VTE Risk Mitigation (From admission, onward)           Ordered     heparin (porcine) injection 5,000 Units  Every 8 hours         08/25/24 3883     IP VTE HIGH RISK " PATIENT  Once         08/25/24 1847     Place sequential compression device  Until discontinued         08/25/24 1847                                    Leodan Brandon MD  Department of Hospital Medicine  [facility] - Emergency Department

## 2024-08-26 NOTE — ASSESSMENT & PLAN NOTE
"Patient is identified as having Systolic (HFrEF) heart failure that is Chronic. CHF is currently uncontrolled due to >3 pillow orthopnea, Rales/crackles on pulmonary exam, and Pulmonary edema/pleural effusion on CXR. Latest ECHO performed and demonstrates- Results for orders placed during the hospital encounter of 03/19/21    Echo Color Flow Doppler? Yes    Interpretation Summary  · The left ventricle is normal in size with moderate concentric hypertrophy and low normal systolic function. The estimated ejection fraction is 50%  · There is pulmonary hypertension.  · There is right ventricular hypertrophy.  · Normal right ventricular size with normal right ventricular systolic function.  · Normal central venous pressure (3 mmHg).  · The estimated PA systolic pressure is 70 mmHg.  · Differential include hypertensive heart disease vs. Cardiac amyloidosis - recommend clinical correlation  . Continue Beta Blocker and monitor clinical status closely. Monitor on telemetry. Patient is off CHF pathway.  Monitor strict Is&Os and daily weights.  Place on fluid restriction of 1.5 L. Cardiology has not been consulted. Continue to stress to patient importance of self efficacy and  on diet for CHF. Last BNP reviewed- and noted below No results for input(s): "BNP", "BNPTRIAGEBLO" in the last 168 hours.     "

## 2024-08-26 NOTE — ASSESSMENT & PLAN NOTE
Chronic, uncontrolled. Latest blood pressure and vitals reviewed-     Temp:  [98.4 °F (36.9 °C)]   Pulse:  []   Resp:  [11-19]   BP: (132-164)/(60-93)   SpO2:  [98 %-100 %] .   Home meds for hypertension were reviewed and noted below.   Hypertension Medications               carvediloL (COREG) 12.5 MG tablet Take 12.5 mg by mouth 2 (two) times daily.    hydrALAZINE (APRESOLINE) 100 MG tablet Take 1 tablet (100 mg total) by mouth 3 (three) times daily.            While in the hospital, will manage blood pressure as follows; Continue home antihypertensive regimen    Will utilize p.r.n. blood pressure medication only if patient's blood pressure greater than 180/110 and he develops symptoms such as worsening chest pain or shortness of breath.   Detail Level: Zone

## 2024-08-26 NOTE — ASSESSMENT & PLAN NOTE
Patient presented with SOB, denies chest pain  Troponin trend 569 --> 553.  EKG without acute ST changes.   Echocardiogram pending.  Cardiology consulted.   Heparin drip started, resume aspirin, statin and beta blocker.

## 2024-08-26 NOTE — CONSULTS
[facility] Tele[facility]  Nephrology  Consult Note    Patient Name: Michoacano Damon  MRN: [id number]  Admission Date: 8/25/2024  Hospital Length of Stay: 1 days  Attending Provider: Joel Sellers MD   Primary Care Physician: Norma, Primary Doctor  Principal Problem:ACS (acute coronary syndrome)    Consults  Subjective:     HPI: 74-year-old man with ESRD secondary to hypertension.  He has severe COPD and requires home oxygen.  He presented with shortness of breath.  Chest x-ray shows mild volume overload.  He is also hyperkalemic.    Past Medical History:   Diagnosis Date    Anticoagulant long-term use     COPD (chronic obstructive pulmonary disease)     Coronary artery disease     ESRD on dialysis     HFrEF (heart failure with reduced ejection fraction) 2021    Hypertension        Past Surgical History:   Procedure Laterality Date    AV FISTULA PLACEMENT, BRACHIOCEPHALIC      INSERTION OF DIALYSIS CATHETER         Review of patient's allergies indicates:  No Known Allergies  Current Facility-Administered Medications   Medication Frequency    0.9%  NaCl infusion PRN    acetaminophen tablet 650 mg Q4H PRN    albuterol-ipratropium 2.5 mg-0.5 mg/3 mL nebulizer solution 3 mL Q6H    aspirin EC tablet 81 mg Daily    atorvastatin tablet 80 mg Daily    budesonide nebulizer solution 0.5 mg BID    carvediloL tablet 12.5 mg BID    dextrose 10% bolus 125 mL 125 mL PRN    dextrose 10% bolus 250 mL 250 mL PRN    glucagon (human recombinant) injection 1 mg PRN    glucose chewable tablet 16 g PRN    glucose chewable tablet 24 g PRN    heparin 25,000 units in dextrose 5% (100 units/ml) IV bolus from bag LOW INTENSITY [facility] PRN    heparin 25,000 units in dextrose 5% (100 units/ml) IV bolus from bag LOW INTENSITY [facility] PRN    heparin 25,000 units in dextrose 5% 250 mL (100 units/mL) infusion LOW INTENSITY [facility] Continuous    hydrALAZINE tablet 50 mg TID    HYDROcodone-acetaminophen   mg per tablet 1 tablet Q8H PRN    [START ON 8/27/2024] levoFLOXacin 500 mg/100 mL IVPB 500 mg Q48H    melatonin tablet 6 mg Nightly PRN    methylPREDNISolone sodium succinate injection 40 mg Q6H    montelukast tablet 10 mg QHS    mupirocin 2 % ointment BID    naloxone 0.4 mg/mL injection 0.02 mg PRN    ondansetron injection 4 mg Q8H PRN    sevelamer carbonate tablet 800 mg TID WM    sodium chloride 0.9% flush 10 mL Q12H PRN    ticagrelor tablet 90 mg BID     Family History       Problem Relation (Age of Onset)    Stroke Mother          Tobacco Use    Smoking status: Every Day     Current packs/day: 1.00     Average packs/day: 1 pack/day for 15.0 years (15.0 ttl pk-yrs)     Types: Cigarettes    Smokeless tobacco: Never   Substance and Sexual Activity    Alcohol use: Not Currently    Drug use: Not Currently    Sexual activity: Not on file     Review of Systems   Constitutional:  Positive for appetite change. Negative for fever.   HENT:  Positive for dental problem.    Eyes:  Negative for visual disturbance.   Respiratory:  Positive for cough, shortness of breath and wheezing.    Cardiovascular:  Negative for chest pain, palpitations and leg swelling.   Gastrointestinal:  Negative for abdominal pain, diarrhea and nausea.   Genitourinary:  Negative for dysuria and hematuria.     Objective:     Vital Signs (Most Recent):  Temp: 97.8 °F (36.6 °C) (08/26/24 0712)  Pulse: 90 (08/26/24 0800)  Resp: 16 (08/26/24 0800)  BP: (!) 152/76 (08/26/24 0712)  SpO2: 99 % (08/26/24 0800) Vital Signs (24h Range):  Temp:  [97.8 °F (36.6 °C)-99.1 °F (37.3 °C)] 97.8 °F (36.6 °C)  Pulse:  [] 90  Resp:  [10-22] 16  SpO2:  [94 %-100 %] 99 %  BP: (112-164)/(60-95) 152/76     Weight: 70.9 kg (156 lb 4.9 oz) (08/26/24 0200)  Body mass index is 21.8 kg/m².  Body surface area is 1.88 meters squared.    I/O last 3 completed shifts:  In: 243.9 [I.V.:104.8; IV Piggyback:139.1]  Out: 200 [Urine:200]     Physical Exam  Constitutional:        General: He is not in acute distress.  HENT:      Head: Normocephalic and atraumatic.   Eyes:      Pupils: Pupils are equal, round, and reactive to light.   Cardiovascular:      Rate and Rhythm: Normal rate and regular rhythm.      Heart sounds: No murmur heard.     No friction rub. No gallop.   Pulmonary:      Effort: Respiratory distress present.      Breath sounds: Wheezing present. No rales.   Abdominal:      General: Bowel sounds are normal.      Tenderness: There is no abdominal tenderness. There is no guarding.   Musculoskeletal:      Right lower leg: No edema.      Left lower leg: No edema.   Neurological:      Mental Status: He is alert and oriented to person, place, and time.   Psychiatric:         Behavior: Behavior normal.          Significant Labs:  BMP:   Recent Labs   Lab 08/25/24  1513 08/26/24  0447   GLU 99 91    136   K 5.9* 7.2*    106   CO2 25 24   BUN 58* 72*   CREATININE 7.96* 8.67*   CALCIUM 8.6 8.9   MG 2.8*  --      CBC:   Recent Labs   Lab 08/26/24  0447   WBC 6.66   RBC 3.11*   HGB 9.2*   HCT 29.9*   PLT 66*   MCV 96.1*   MCH 29.6   MCHC 30.8*       Significant Imaging:    Assessment/Plan:     Pulmonary  COPD (chronic obstructive pulmonary disease)  He has severe COPD.  He has required multiple admissions for exacerbations recently.  He requires home O2.    Cardiac/Vascular  CAD (coronary artery disease)  He currently denies chest pain    Hypertension  Controlled    Renal/  ESRD (end stage renal disease)  Seen during dialysis.  Chest x-ray shows volume overload.  He is also markedly hyperkalemic.  Goal UF 4 L.        Thank you for your consult.     Smooth Herrera MD  Nephrology  [facility] - [address] [place] [address]

## 2024-08-26 NOTE — HPI
Patient is a 74-year-old male who presents to [facility] via EMS, [facility] with c/o Weakness and SOB.  Patient has a past medical history of COPD, CAD, ESRD on dialysis MWF and HFrEF.  Patient states he is more short of breath than usual and this started today.  He states he is unable to walk due to his shortness of breath and weakness.  Patient states he is compliant with his medications and that he does not missed dialysis.  Patient also has a complaint of mouth pain.  He points to the left lower gumline to a point of tenderness.  Patient has no teeth at all.  Patient was recently seen at [facility] pulmonology 5 days ago for checkup.  Patient denies headache, chest pain, abdominal pain or lower extremity edema.    Workup in the ED shows Troponin 569, CXR - Findings suggest mild cardiac decompensation and / or pneumonitis.    Patient will be admitted to [facility] for continued care and medical management.

## 2024-08-26 NOTE — ASSESSMENT & PLAN NOTE
Chronic, uncontrolled. Latest blood pressure and vitals reviewed-     Temp:  [97.5 °F (36.4 °C)-99.1 °F (37.3 °C)]   Pulse:  []   Resp:  [10-22]   BP: ()/(34-95)   SpO2:  [94 %-100 %] .   Home meds for hypertension were reviewed and noted below.   Hypertension Medications               carvediloL (COREG) 12.5 MG tablet Take 12.5 mg by mouth 2 (two) times daily.    hydrALAZINE (APRESOLINE) 100 MG tablet Take 1 tablet (100 mg total) by mouth 3 (three) times daily.            While in the hospital, will manage blood pressure as follows; Continue home antihypertensive regimen    Will utilize p.r.n. blood pressure medication only if patient's blood pressure greater than 180/110 and he develops symptoms such as worsening chest pain or shortness of breath.

## 2024-08-26 NOTE — ASSESSMENT & PLAN NOTE
Patient's COPD is with exacerbation noted by continued dyspnea and use of accessory muscles for breathing currently.  Patient is currently off COPD Pathway. Continue scheduled inhalers Steroids and Supplemental oxygen and monitor respiratory status closely.

## 2024-08-26 NOTE — ASSESSMENT & PLAN NOTE
Patient with known CAD s/p  unknown, patient on Brilinta , which is uncontrolled as suspect ACS on presentation.  Will continue ASA and Statin and monitor for S/Sx of angina/ACS. Continue to monitor on telemetry.

## 2024-08-26 NOTE — CONSULTS
[facility] - [address] [place] [address]  Cardiology  Consult Note    Patient Name: Michoacano Damon  MRN: [id number]  Admission Date: 8/25/2024  Hospital Length of Stay: 1 days  Code Status: Full Code   Attending Provider: Joel Sellers MD   Consulting Provider: ANDRÉS Smith  Primary Care Physician: Norma, Primary Doctor  Principal Problem:ACS (acute coronary syndrome)    Patient information was obtained from patient, past medical records, ER records, and primary team.     Inpatient consult to Cardiology  Consult performed by: Maryanne English FNP  Consult ordered by: David Vega MD  Reason for consult: NSTEMI        Subjective:     Chief Complaint:  sob, weakness, and mouth pain     HPI:   73 y/o male with PMH of CAD multivessel, CHF, ESRD on dialysis, COPD who presents to [facility] via EMS, [facility] with c/o Weakness and SOB. Patient states he is more short of breath than usual and this started today.  He states he is unable to walk due to his shortness of breath and weakness.  Patient states he is compliant with his medications and that he does not missed dialysis.  Patient also has a complaint of mouth pain.  He points to the left lower gumline to a point of tenderness.  Patient has no teeth at all.  Patient was recently seen at [facility] pulmonology 5 days ago for checkup.  Patient denies headache, chest pain, abdominal pain or lower extremity edema.     Cardiology consulted for NSTEMI. Troponin 569, 553. EKG SR with PACs and nonspecific TWA. Repeat echo pending. CXR with mild cardiac decompensation and / or pneumonitis. Most recent LHC was in 2022 s/p PCI of dist RCA and then in 2021 s/p PCI of LAD & Lcx. Last seen by Dr. Santos in 8/2021. Patient seen while in dialysis today. He denies any chest pain. His main complaint today is mouth pain and difficulty swallowing his food.     Past Medical History:   Diagnosis Date    Anticoagulant long-term use      COPD (chronic obstructive pulmonary disease)     Coronary artery disease     ESRD on dialysis     HFrEF (heart failure with reduced ejection fraction) 2021    Hypertension        Past Surgical History:   Procedure Laterality Date    AV FISTULA PLACEMENT, BRACHIOCEPHALIC      INSERTION OF DIALYSIS CATHETER         Review of patient's allergies indicates:  No Known Allergies    No current facility-administered medications on file prior to encounter.     Current Outpatient Medications on File Prior to Encounter   Medication Sig    albuterol-ipratropium (DUO-NEB) 2.5 mg-0.5 mg/3 mL nebulizer solution Take 3 mLs by nebulization every 6 (six) hours as needed for Wheezing. Rescue    arformoteroL (BROVANA) 15 mcg/2 mL Nebu Take 15 mcg by nebulization 2 (two) times a day. Controller    aspirin (ECOTRIN) 81 MG EC tablet Take 81 mg by mouth once daily.    budesonide (PULMICORT) 0.5 mg/2 mL nebulizer solution Take 0.5 mg by nebulization 2 (two) times a day.    carvediloL (COREG) 12.5 MG tablet Take 12.5 mg by mouth 2 (two) times daily.    hydrALAZINE (APRESOLINE) 100 MG tablet Take 1 tablet (100 mg total) by mouth 3 (three) times daily.    HYDROcodone-acetaminophen (NORCO)  mg per tablet Take 1 tablet by mouth 3 (three) times daily as needed for pain    montelukast (SINGULAIR) 10 mg tablet Take 1 tablet (10 mg total) by mouth every evening.    olopatadine (PATANOL) 0.1 % ophthalmic solution Place 1 drop into both eyes once daily.    predniSONE (DELTASONE) 20 MG tablet Take 1 tablet (20 mg total) by mouth 2 (two) times daily with meals.    ROFLUMILAST ORAL Take 500 mcg by mouth Daily.    rosuvastatin (CRESTOR) 20 MG tablet Take 1 tablet (20 mg total) by mouth nightly for cholesterol.    sevelamer carbonate (RENVELA) 800 mg Tab Take 800 mg by mouth 3 (three) times daily with meals.    ticagrelor (BRILINTA) 90 mg tablet Take 1 tablet (90 mg total) by mouth 2 (two) times a day.     Family History       Problem Relation (Age  of Onset)    Stroke Mother          Tobacco Use    Smoking status: Every Day     Current packs/day: 1.00     Average packs/day: 1 pack/day for 15.0 years (15.0 ttl pk-yrs)     Types: Cigarettes    Smokeless tobacco: Never   Substance and Sexual Activity    Alcohol use: Not Currently    Drug use: Not Currently    Sexual activity: Not on file     Review of Systems   Constitutional: Negative for chills and fever.   HENT:          Mouth pain   Cardiovascular:  Positive for dyspnea on exertion. Negative for chest pain and orthopnea.   Respiratory:  Positive for shortness of breath.    Gastrointestinal:  Positive for dysphagia.     Objective:     Vital Signs (Most Recent):  Temp: 97.7 °F (36.5 °C) (08/26/24 1613)  Pulse: 79 (08/26/24 1613)  Resp: 19 (08/26/24 1613)  BP: 108/72 (08/26/24 1613)  SpO2: 97 % (08/26/24 1613) Vital Signs (24h Range):  Temp:  [97.5 °F (36.4 °C)-99.1 °F (37.3 °C)] 97.7 °F (36.5 °C)  Pulse:  [] 79  Resp:  [10-22] 19  SpO2:  [94 %-100 %] 97 %  BP: ()/(34-95) 108/72     Weight: 70.9 kg (156 lb 4.9 oz)  Body mass index is 21.8 kg/m².    SpO2: 97 %         Intake/Output Summary (Last 24 hours) at 8/26/2024 1630  Last data filed at 8/26/2024 0656  Gross per 24 hour   Intake 243.87 ml   Output 200 ml   Net 43.87 ml       Lines/Drains/Airways       Peripheral Intravenous Line  Duration                  Hemodialysis AV Fistula Right forearm -- days         Peripheral IV - Single Lumen 08/25/24 20 G Anterior;Left Forearm 1 day         Peripheral IV - Single Lumen 08/25/24 1655 20 G Other (Comments) No Anterior;Left Upper Arm <1 day                     Physical Exam  Vitals reviewed.   Constitutional:       Appearance: He is ill-appearing.   Cardiovascular:      Rate and Rhythm: Normal rate and regular rhythm.      Heart sounds: Normal heart sounds.   Pulmonary:      Effort: Pulmonary effort is normal.      Breath sounds: Wheezing present.   Abdominal:      General: Bowel sounds are normal.       "Palpations: Abdomen is soft.   Skin:     General: Skin is warm and dry.   Neurological:      Mental Status: He is alert and oriented to person, place, and time.          Significant Labs: ABG: No results for input(s): "PH", "PCO2", "HCO3", "POCSATURATED", "BE" in the last 48 hours., Blood Culture: No results for input(s): "LABBLOO" in the last 48 hours., BMP:   Recent Labs   Lab 08/25/24  1513 08/26/24  0447   GLU 99 91    136   K 5.9* 7.2*    106   CO2 25 24   BUN 58* 72*   CREATININE 7.96* 8.67*   CALCIUM 8.6 8.9   MG 2.8*  --    , CMP   Recent Labs   Lab 08/25/24  1513 08/26/24  0447    136   K 5.9* 7.2*    106   CO2 25 24   GLU 99 91   BUN 58* 72*   CREATININE 7.96* 8.67*   CALCIUM 8.6 8.9   PROT 5.4*  --    ALBUMIN 2.9*  --    BILITOT 0.4  --    ALKPHOS 65  --    AST 47*  --    ALT 52  --    ANIONGAP 12 13   , CBC   Recent Labs   Lab 08/25/24  1513 08/26/24  0447   WBC 6.25 6.66   HGB 10.0* 9.2*   HCT 32.9* 29.9*   PLT 78* 66*   , Lipid Panel No results for input(s): "CHOL", "HDL", "LDLCALC", "TRIG", "CHOLHDL" in the last 48 hours., and Troponin No results for input(s): "TROPONINI" in the last 48 hours.    Significant Imaging: Cardiac Cath: 2020 & 2022 ,     Echocardiogram: Transthoracic echo (TTE) complete (Cupid Only):   Results for orders placed or performed during the hospital encounter of 08/25/24   Echo   Result Value Ref Range    BSA 1.88 m2    A4C EF 45 %    LVOT stroke volume 40.87 cm3    LVIDd 3.64 3.5 - 6.0 cm    LV Systolic Volume 31.10 mL    LV Systolic Volume Index 16.4 mL/m2    LVIDs 2.86 2.1 - 4.0 cm    LV Diastolic Volume 55.72 mL    LV ESV A4C 41.29 mL    LV Diastolic Volume Index 29.33 mL/m2    LV EDV A4C 33.45 mL    Left Ventricular End Systolic Volume by Teichholz Method 31.10 mL    Left Ventricular End Diastolic Volume by Teichholz Method 55.72 mL    IVS 1.89 (A) 0.6 - 1.1 cm    LVOT diameter 1.72 cm    LVOT area 2.3 cm2    FS 21 (A) 28 - 44 %    Left Ventricle " Relative Wall Thickness 1.10 cm    Posterior Wall 2.01 (A) 0.6 - 1.1 cm    LV mass 317.12 g    LV Mass Index 167 g/m2    MV Peak E Abraham 0.53 m/s    TDI LATERAL 0.07 m/s    TDI SEPTAL 0.03 m/s    E/E' ratio 10.60 m/s    MV Peak A Abraham 1.50 m/s    TR Max Abraham 3.00 m/s    E/A ratio 0.35     E wave deceleration time 173.74 msec    LV SEPTAL E/E' RATIO 17.67 m/s    LV LATERAL E/E' RATIO 7.57 m/s    LVOT peak abraham 1.10 m/s    Left Ventricular Outflow Tract Mean Velocity 0.78 cm/s    Left Ventricular Outflow Tract Mean Gradient 2.75 mmHg    RV- herrera basal diam 3.6 cm    RV-herrera mid d 3.2 cm    Right ventricular length in diastole (apical 4-chamber view) 3.88 cm    RV-herrera length 3.9 cm    RV mid diameter 3.22 cm    TAPSE 1.23 cm    LA size 3.43 cm    RA Major Axis 3.88 cm    AV mean gradient 6 mmHg    AV peak gradient 13 mmHg    Ao peak abraham 1.77 m/s    Ao VTI 32.40 cm    LVOT peak VTI 17.60 cm    AV valve area 1.26 cm²    AV Velocity Ratio 0.62     AV index (prosthetic) 0.54     EUFEMIA by Velocity Ratio 1.44 cm²    MV stenosis pressure 1/2 time 50.39 ms    MV valve area p 1/2 method 4.37 cm2    Triscuspid Valve Regurgitation Peak Gradient 36 mmHg    PV PEAK VELOCITY 0.68 m/s    PV peak gradient 2 mmHg    Ao root annulus 3.43 cm    IVC diameter 1.88 cm    Mean e' 0.05 m/s    ZLVIDS -1.10     ZLVIDD -3.82     LA area A4C 15.31 cm2    AORTIC VALVE CUSP SEPERATION 1.70 cm    EF 45 %    TV resting pulmonary artery pressure 39 mmHg    RV TB RVSP 6 mmHg    Est. RA pres 3 mmHg    Narrative      Left Ventricle: The left ventricle is smaller than normal. There is   severe concentric hypertrophy. Regional wall motion abnormalities present.    Inferior wall hypokinesis is noted There is mildly reduced systolic   function with a visually estimated ejection fraction of 40 - 50%. Ejection   fraction by visual approximation is 45%. There is diastolic dysfunction.    Right Ventricle: Normal right ventricular cavity size.    Aortic Valve: The  aortic valve is a trileaflet valve. Mildly calcified   cusps.    Tricuspid Valve: There is mild regurgitation. There is mild pulmonary   hypertension.    IVC/SVC: Normal venous pressure at 3 mmHg.    Pericardium: There is a trivial effusion.       , EKG: SR with PACs and nonspecific TWA    , and X-Ray: CXR: X-Ray Chest 1 View (CXR):   X-Ray Chest AP Portable  Order: [id number]  Status: Final result       Visible to patient: No (inaccessible in MyChart)       Next appt: None    0 Result Notes  Details    Reading Physician Reading Date Result Priority   Mohit Bowser II, MD  [phone] 8/25/2024 STAT     Narrative & Impression  EXAMINATION:  XR CHEST AP PORTABLE     CLINICAL HISTORY:  CHF;     COMPARISON:  4 November 2021     TECHNIQUE:  XR CHEST AP PORTABLE     FINDINGS:  The heart and mediastinum are stable in size and configuration.  The pulmonary vascularity is slightly increased with bilateral increased interstitial lung density.  No other lung infiltrates, effusions, pneumothorax or other abnormality is demonstrated.     Impression:     Findings suggest mild cardiac decompensation and / or pneumonitis.        Electronically signed by:Mohit Bowser  Date:                                            08/25/2024  Time:                                           14:21     Assessment and Plan:     Hyperkalemia  - Potassium 7.2 this morning, currently being dialyzed  - Being followed by primary team    NSTEMI (non-ST elevated myocardial infarction)  - Patient seen and evaluated by Dr. Trujillo  - Previously followed by Dr. Santos, last seen 8/2021; LHC 2022 s/p stent RCA and 2020 s/p stents LAD & Lcx  - Troponin 569, 553; flat  - EKG SR with PAC and nonspecific TWA  - Repeat echo pending  - This is likely Type 2 MI in setting of ESRD on dialysis and heart failure, however we will review echo for any new abnormalities and decide if LHC may be warranted  - Further recommendations to follow    Chronic  obstructive pulmonary disease with acute exacerbation  - Followed by pulmonology    ESRD (end stage renal disease)  - On dialysis        VTE Risk Mitigation (From admission, onward)           Ordered     heparin 25,000 units in dextrose 5% 250 mL (100 units/mL) infusion LOW INTENSITY nomogram - [facility]  Continuous        Question:  Begin at (units/kg/hr)  Answer:  12    08/25/24 2044     heparin 25,000 units in dextrose 5% (100 units/ml) IV bolus from bag LOW INTENSITY nomogram - [facility]  As needed (PRN)        Question:  Heparin Infusion Adjustment (DO NOT MODIFY ANSWER)  Answer:  [email or web address]\epic\Images\Pharmacy\HeparinInfusions\heparin LOW INTENSITY nomogram for [facility] [id number].pdf    08/25/24 2044     heparin 25,000 units in dextrose 5% (100 units/ml) IV bolus from bag LOW INTENSITY nomogram - [facility]  As needed (PRN)        Question:  Heparin Infusion Adjustment (DO NOT MODIFY ANSWER)  Answer:  [facility]\epic\Images\Pharmacy\HeparinInfusions\heparin LOW INTENSITY nomogram for [facility] [id number].pdf    08/25/24 2044     IP VTE HIGH RISK PATIENT  Once         08/25/24 1847     Place sequential compression device  Until discontinued         08/25/24 1847                    Thank you for your consult. I will follow-up with patient. Please contact us if you have any additional questions.    Maryanne English, ANDRÉS  Cardiology   [facility] - [address] [place] [address]

## 2024-08-27 PROBLEM — R94.30 WALL MOTION ABNORMALITY OF INFERIOR WALL OF LEFT VENTRICLE: Status: ACTIVE | Noted: 2024-08-27

## 2024-08-27 PROBLEM — R68.84 JAW PAIN: Status: ACTIVE | Noted: 2024-08-27

## 2024-08-27 PROBLEM — I21.4 NSTEMI (NON-ST ELEVATED MYOCARDIAL INFARCTION): Status: RESOLVED | Noted: 2024-08-26 | Resolved: 2024-08-27

## 2024-08-27 PROBLEM — I21.4 NSTEMI (NON-ST ELEVATED MYOCARDIAL INFARCTION): Status: ACTIVE | Noted: 2024-08-25

## 2024-08-27 LAB
ANION GAP SERPL CALCULATED.3IONS-SCNC: 15 MMOL/L (ref 7–16)
BASOPHILS # BLD AUTO: 0 K/UL (ref 0–0.2)
BASOPHILS NFR BLD AUTO: 0 % (ref 0–1)
BUN SERPL-MCNC: 86 MG/DL (ref 7–18)
BUN/CREAT SERPL: 12 (ref 6–20)
CALCIUM SERPL-MCNC: 8.5 MG/DL (ref 8.5–10.1)
CHLORIDE SERPL-SCNC: 106 MMOL/L (ref 98–107)
CO2 SERPL-SCNC: 27 MMOL/L (ref 21–32)
CREAT SERPL-MCNC: 7.06 MG/DL (ref 0.7–1.3)
DIFFERENTIAL METHOD BLD: ABNORMAL
EGFR (NO RACE VARIABLE) (RUSH/TITUS): 8 ML/MIN/1.73M2
EOSINOPHIL # BLD AUTO: 0 K/UL (ref 0–0.5)
EOSINOPHIL NFR BLD AUTO: 0 % (ref 1–4)
ERYTHROCYTE [DISTWIDTH] IN BLOOD BY AUTOMATED COUNT: 19.3 % (ref 11.5–14.5)
GLUCOSE SERPL-MCNC: 113 MG/DL (ref 74–106)
HCT VFR BLD AUTO: 20.3 % (ref 40–54)
HCT VFR BLD AUTO: 21.6 % (ref 40–54)
HGB BLD-MCNC: 6.4 G/DL (ref 13.5–18)
HGB BLD-MCNC: 6.7 G/DL (ref 13.5–18)
IMM GRANULOCYTES # BLD AUTO: 0.16 K/UL (ref 0–0.04)
IMM GRANULOCYTES NFR BLD: 2.3 % (ref 0–0.4)
INDIRECT COOMBS: NORMAL
LYMPHOCYTES # BLD AUTO: 0.27 K/UL (ref 1–4.8)
LYMPHOCYTES NFR BLD AUTO: 3.9 % (ref 27–41)
MCH RBC QN AUTO: 29.5 PG (ref 27–31)
MCHC RBC AUTO-ENTMCNC: 31 G/DL (ref 32–36)
MCV RBC AUTO: 95.2 FL (ref 80–96)
MONOCYTES # BLD AUTO: 0.31 K/UL (ref 0–0.8)
MONOCYTES NFR BLD AUTO: 4.5 % (ref 2–6)
MPC BLD CALC-MCNC: 11.6 FL (ref 9.4–12.4)
NEUTROPHILS # BLD AUTO: 6.13 K/UL (ref 1.8–7.7)
NEUTROPHILS NFR BLD AUTO: 89.3 % (ref 53–65)
NRBC # BLD AUTO: 0 X10E3/UL
NRBC, AUTO (.00): 0 %
PLATELET # BLD AUTO: 66 K/UL (ref 150–400)
POTASSIUM SERPL-SCNC: 5.7 MMOL/L (ref 3.5–5.1)
POTASSIUM SERPL-SCNC: 6 MMOL/L (ref 3.5–5.1)
RBC # BLD AUTO: 2.27 M/UL (ref 4.6–6.2)
RH BLD: NORMAL
SODIUM SERPL-SCNC: 142 MMOL/L (ref 136–145)
SPECIMEN OUTDATE: NORMAL
WBC # BLD AUTO: 6.87 K/UL (ref 4.5–11)

## 2024-08-27 PROCEDURE — 86901 BLOOD TYPING SEROLOGIC RH(D): CPT | Performed by: INTERNAL MEDICINE

## 2024-08-27 PROCEDURE — 36415 COLL VENOUS BLD VENIPUNCTURE: CPT | Performed by: NURSE PRACTITIONER

## 2024-08-27 PROCEDURE — 36415 COLL VENOUS BLD VENIPUNCTURE: CPT | Performed by: INTERNAL MEDICINE

## 2024-08-27 PROCEDURE — 25000003 PHARM REV CODE 250: Performed by: INTERNAL MEDICINE

## 2024-08-27 PROCEDURE — 86923 COMPATIBILITY TEST ELECTRIC: CPT | Performed by: INTERNAL MEDICINE

## 2024-08-27 PROCEDURE — 25000242 PHARM REV CODE 250 ALT 637 W/ HCPCS: Performed by: INTERNAL MEDICINE

## 2024-08-27 PROCEDURE — B2111ZZ FLUOROSCOPY OF MULTIPLE CORONARY ARTERIES USING LOW OSMOLAR CONTRAST: ICD-10-PCS | Performed by: STUDENT IN AN ORGANIZED HEALTH CARE EDUCATION/TRAINING PROGRAM

## 2024-08-27 PROCEDURE — 63600175 PHARM REV CODE 636 W HCPCS: Performed by: STUDENT IN AN ORGANIZED HEALTH CARE EDUCATION/TRAINING PROGRAM

## 2024-08-27 PROCEDURE — 63600175 PHARM REV CODE 636 W HCPCS: Performed by: INTERNAL MEDICINE

## 2024-08-27 PROCEDURE — 93458 L HRT ARTERY/VENTRICLE ANGIO: CPT | Mod: 26,,, | Performed by: STUDENT IN AN ORGANIZED HEALTH CARE EDUCATION/TRAINING PROGRAM

## 2024-08-27 PROCEDURE — 85014 HEMATOCRIT: CPT | Performed by: INTERNAL MEDICINE

## 2024-08-27 PROCEDURE — 30233N1 TRANSFUSION OF NONAUTOLOGOUS RED BLOOD CELLS INTO PERIPHERAL VEIN, PERCUTANEOUS APPROACH: ICD-10-PCS | Performed by: INTERNAL MEDICINE

## 2024-08-27 PROCEDURE — 4A023N7 MEASUREMENT OF CARDIAC SAMPLING AND PRESSURE, LEFT HEART, PERCUTANEOUS APPROACH: ICD-10-PCS | Performed by: STUDENT IN AN ORGANIZED HEALTH CARE EDUCATION/TRAINING PROGRAM

## 2024-08-27 PROCEDURE — 94640 AIRWAY INHALATION TREATMENT: CPT

## 2024-08-27 PROCEDURE — 99152 MOD SED SAME PHYS/QHP 5/>YRS: CPT | Mod: ,,, | Performed by: STUDENT IN AN ORGANIZED HEALTH CARE EDUCATION/TRAINING PROGRAM

## 2024-08-27 PROCEDURE — 25000242 PHARM REV CODE 250 ALT 637 W/ HCPCS

## 2024-08-27 PROCEDURE — 25000003 PHARM REV CODE 250: Performed by: STUDENT IN AN ORGANIZED HEALTH CARE EDUCATION/TRAINING PROGRAM

## 2024-08-27 PROCEDURE — 99900035 HC TECH TIME PER 15 MIN (STAT)

## 2024-08-27 PROCEDURE — P9016 RBC LEUKOCYTES REDUCED: HCPCS | Performed by: INTERNAL MEDICINE

## 2024-08-27 PROCEDURE — 80048 BASIC METABOLIC PNL TOTAL CA: CPT | Performed by: NURSE PRACTITIONER

## 2024-08-27 PROCEDURE — 99233 SBSQ HOSP IP/OBS HIGH 50: CPT | Mod: ,,, | Performed by: NURSE PRACTITIONER

## 2024-08-27 PROCEDURE — 84132 ASSAY OF SERUM POTASSIUM: CPT | Performed by: INTERNAL MEDICINE

## 2024-08-27 PROCEDURE — 93458 L HRT ARTERY/VENTRICLE ANGIO: CPT | Performed by: STUDENT IN AN ORGANIZED HEALTH CARE EDUCATION/TRAINING PROGRAM

## 2024-08-27 PROCEDURE — 25000003 PHARM REV CODE 250

## 2024-08-27 PROCEDURE — 27000221 HC OXYGEN, UP TO 24 HOURS

## 2024-08-27 PROCEDURE — 85025 COMPLETE CBC W/AUTO DIFF WBC: CPT | Performed by: INTERNAL MEDICINE

## 2024-08-27 PROCEDURE — 25500020 PHARM REV CODE 255: Performed by: STUDENT IN AN ORGANIZED HEALTH CARE EDUCATION/TRAINING PROGRAM

## 2024-08-27 PROCEDURE — 99233 SBSQ HOSP IP/OBS HIGH 50: CPT | Mod: ,,, | Performed by: INTERNAL MEDICINE

## 2024-08-27 PROCEDURE — 86900 BLOOD TYPING SEROLOGIC ABO: CPT | Performed by: INTERNAL MEDICINE

## 2024-08-27 PROCEDURE — 11000001 HC ACUTE MED/SURG PRIVATE ROOM

## 2024-08-27 PROCEDURE — 85018 HEMOGLOBIN: CPT | Performed by: INTERNAL MEDICINE

## 2024-08-27 PROCEDURE — 94761 N-INVAS EAR/PLS OXIMETRY MLT: CPT

## 2024-08-27 RX ORDER — HYDROCODONE BITARTRATE AND ACETAMINOPHEN 500; 5 MG/1; MG/1
TABLET ORAL
Status: DISCONTINUED | OUTPATIENT
Start: 2024-08-27 | End: 2024-08-29 | Stop reason: HOSPADM

## 2024-08-27 RX ORDER — FENTANYL CITRATE 50 UG/ML
INJECTION, SOLUTION INTRAMUSCULAR; INTRAVENOUS
Status: DISCONTINUED | OUTPATIENT
Start: 2024-08-27 | End: 2024-08-27 | Stop reason: HOSPADM

## 2024-08-27 RX ORDER — ONDANSETRON 4 MG/1
8 TABLET, ORALLY DISINTEGRATING ORAL EVERY 8 HOURS PRN
Status: DISCONTINUED | OUTPATIENT
Start: 2024-08-27 | End: 2024-08-29 | Stop reason: HOSPADM

## 2024-08-27 RX ORDER — PREDNISONE 20 MG/1
40 TABLET ORAL DAILY
Status: DISCONTINUED | OUTPATIENT
Start: 2024-08-27 | End: 2024-08-29 | Stop reason: HOSPADM

## 2024-08-27 RX ORDER — SODIUM CHLORIDE 9 MG/ML
INJECTION, SOLUTION INTRAVENOUS
Status: DISCONTINUED | OUTPATIENT
Start: 2024-08-27 | End: 2024-08-29 | Stop reason: HOSPADM

## 2024-08-27 RX ORDER — MIDAZOLAM HYDROCHLORIDE 1 MG/ML
INJECTION INTRAMUSCULAR; INTRAVENOUS
Status: DISCONTINUED | OUTPATIENT
Start: 2024-08-27 | End: 2024-08-27 | Stop reason: HOSPADM

## 2024-08-27 RX ORDER — ACETAMINOPHEN 325 MG/1
650 TABLET ORAL EVERY 4 HOURS PRN
Status: DISCONTINUED | OUTPATIENT
Start: 2024-08-27 | End: 2024-08-29 | Stop reason: HOSPADM

## 2024-08-27 RX ORDER — LIDOCAINE HYDROCHLORIDE 10 MG/ML
INJECTION, SOLUTION INFILTRATION; PERINEURAL
Status: DISCONTINUED | OUTPATIENT
Start: 2024-08-27 | End: 2024-08-27 | Stop reason: HOSPADM

## 2024-08-27 RX ORDER — IOPAMIDOL 755 MG/ML
INJECTION, SOLUTION INTRAVASCULAR
Status: DISCONTINUED | OUTPATIENT
Start: 2024-08-27 | End: 2024-08-27 | Stop reason: HOSPADM

## 2024-08-27 RX ORDER — BUDESONIDE 0.5 MG/2ML
0.5 INHALANT ORAL 2 TIMES DAILY
Status: DISCONTINUED | OUTPATIENT
Start: 2024-08-27 | End: 2024-08-29 | Stop reason: HOSPADM

## 2024-08-27 RX ADMIN — NYSTATIN 500000 UNITS: 100000 SUSPENSION ORAL at 12:08

## 2024-08-27 RX ADMIN — BUDESONIDE 0.5 MG: 0.5 INHALANT RESPIRATORY (INHALATION) at 08:08

## 2024-08-27 RX ADMIN — ATORVASTATIN CALCIUM 80 MG: 80 TABLET, FILM COATED ORAL at 08:08

## 2024-08-27 RX ADMIN — ENOXAPARIN SODIUM 70 MG: 80 INJECTION SUBCUTANEOUS at 01:08

## 2024-08-27 RX ADMIN — CARVEDILOL 12.5 MG: 12.5 TABLET, FILM COATED ORAL at 09:08

## 2024-08-27 RX ADMIN — HYDRALAZINE HYDROCHLORIDE 50 MG: 50 TABLET ORAL at 09:08

## 2024-08-27 RX ADMIN — LEVOFLOXACIN 500 MG: 500 INJECTION, SOLUTION INTRAVENOUS at 08:08

## 2024-08-27 RX ADMIN — CARVEDILOL 12.5 MG: 12.5 TABLET, FILM COATED ORAL at 08:08

## 2024-08-27 RX ADMIN — PREDNISONE 40 MG: 20 TABLET ORAL at 12:08

## 2024-08-27 RX ADMIN — SODIUM CHLORIDE: 9 INJECTION, SOLUTION INTRAVENOUS at 11:08

## 2024-08-27 RX ADMIN — METHYLPREDNISOLONE SODIUM SUCCINATE 40 MG: 40 INJECTION, POWDER, FOR SOLUTION INTRAMUSCULAR; INTRAVENOUS at 12:08

## 2024-08-27 RX ADMIN — NYSTATIN 500000 UNITS: 100000 SUSPENSION ORAL at 09:08

## 2024-08-27 RX ADMIN — BUDESONIDE 0.5 MG: 0.5 INHALANT RESPIRATORY (INHALATION) at 07:08

## 2024-08-27 RX ADMIN — SEVELAMER CARBONATE 800 MG: 800 TABLET, FILM COATED ORAL at 05:08

## 2024-08-27 RX ADMIN — SEVELAMER CARBONATE 800 MG: 800 TABLET, FILM COATED ORAL at 12:08

## 2024-08-27 RX ADMIN — IPRATROPIUM BROMIDE AND ALBUTEROL SULFATE 3 ML: 2.5; .5 SOLUTION RESPIRATORY (INHALATION) at 07:08

## 2024-08-27 RX ADMIN — IPRATROPIUM BROMIDE AND ALBUTEROL SULFATE 3 ML: 2.5; .5 SOLUTION RESPIRATORY (INHALATION) at 08:08

## 2024-08-27 RX ADMIN — NYSTATIN 500000 UNITS: 100000 SUSPENSION ORAL at 08:08

## 2024-08-27 RX ADMIN — MUPIROCIN: 20 OINTMENT TOPICAL at 09:08

## 2024-08-27 RX ADMIN — SODIUM ZIRCONIUM CYCLOSILICATE 10 G: 10 POWDER, FOR SUSPENSION ORAL at 12:08

## 2024-08-27 RX ADMIN — HYDROCODONE BITARTRATE AND ACETAMINOPHEN 1 TABLET: 10; 325 TABLET ORAL at 09:08

## 2024-08-27 RX ADMIN — IPRATROPIUM BROMIDE AND ALBUTEROL SULFATE 3 ML: 2.5; .5 SOLUTION RESPIRATORY (INHALATION) at 12:08

## 2024-08-27 RX ADMIN — MONTELUKAST 10 MG: 10 TABLET, FILM COATED ORAL at 09:08

## 2024-08-27 RX ADMIN — ASPIRIN 81 MG: 81 TABLET, COATED ORAL at 08:08

## 2024-08-27 RX ADMIN — METHYLPREDNISOLONE SODIUM SUCCINATE 40 MG: 40 INJECTION, POWDER, FOR SOLUTION INTRAMUSCULAR; INTRAVENOUS at 05:08

## 2024-08-27 RX ADMIN — TICAGRELOR 90 MG: 90 TABLET ORAL at 08:08

## 2024-08-27 RX ADMIN — NYSTATIN 500000 UNITS: 100000 SUSPENSION ORAL at 05:08

## 2024-08-27 RX ADMIN — SEVELAMER CARBONATE 800 MG: 800 TABLET, FILM COATED ORAL at 08:08

## 2024-08-27 RX ADMIN — TICAGRELOR 90 MG: 90 TABLET ORAL at 09:08

## 2024-08-27 RX ADMIN — SODIUM ZIRCONIUM CYCLOSILICATE 10 G: 10 POWDER, FOR SUSPENSION ORAL at 09:08

## 2024-08-27 NOTE — SUBJECTIVE & OBJECTIVE
Interval History: Patient seen and examined at the bedside, reports feeling short of breath but improved, no chest pain. Complains of jaw pain.     Review of Systems   HENT:          Jaw pain   Respiratory:  Positive for shortness of breath.    Cardiovascular:  Negative for chest pain.     Objective:     Vital Signs (Most Recent):  Temp: 98.3 °F (36.8 °C) (08/27/24 0740)  Pulse: 91 (08/27/24 0749)  Resp: 18 (08/27/24 0900)  BP: 105/63 (08/27/24 0740)  SpO2: 97 % (08/27/24 0749) Vital Signs (24h Range):  Temp:  [97.4 °F (36.3 °C)-98.6 °F (37 °C)] 98.3 °F (36.8 °C)  Pulse:  [] 91  Resp:  [12-20] 18  SpO2:  [93 %-100 %] 97 %  BP: ()/(53-72) 105/63     Weight: 70.9 kg (156 lb 4.9 oz)  Body mass index is 21.8 kg/m².    Intake/Output Summary (Last 24 hours) at 8/27/2024 1103  Last data filed at 8/27/2024 0607  Gross per 24 hour   Intake 122.7 ml   Output --   Net 122.7 ml         Physical Exam  Vitals and nursing note reviewed.   Constitutional:       Appearance: He is ill-appearing.   HENT:      Head: Normocephalic and atraumatic.      Nose: Nose normal.      Mouth/Throat:      Mouth: Mucous membranes are moist.   Eyes:      Extraocular Movements: Extraocular movements intact.   Cardiovascular:      Rate and Rhythm: Normal rate and regular rhythm.      Pulses: Normal pulses.      Heart sounds: Normal heart sounds.   Pulmonary:      Effort: Pulmonary effort is normal.      Breath sounds: Rales present.      Comments: On 1L NC  Abdominal:      General: Bowel sounds are normal.      Palpations: Abdomen is soft.   Musculoskeletal:         General: Normal range of motion.      Cervical back: Normal range of motion.   Skin:     General: Skin is warm.   Neurological:      General: No focal deficit present.      Mental Status: He is alert and oriented to person, place, and time. Mental status is at baseline.   Psychiatric:         Mood and Affect: Mood normal.         Behavior: Behavior normal.              Significant Labs: All pertinent labs within the past 24 hours have been reviewed.    Significant Imaging: I have reviewed all pertinent imaging results/findings within the past 24 hours.

## 2024-08-27 NOTE — ASSESSMENT & PLAN NOTE
Patient with Hypercapnic and Hypoxic Respiratory failure which is Acute on chronic.  he is on home oxygen at 2 LPM. Supplemental oxygen was provided and noted- Oxygen Concentration (%):  [24-94] 24    .   Signs/symptoms of respiratory failure include- tachypnea and respiratory distress. Contributing diagnoses includes - CHF and COPD Labs and images were reviewed. Patient Has recent ABG, which has been reviewed. Will treat underlying causes and adjust management of respiratory failure as follows-     Continue volume removal with HD, steroids/antibiotics.   Wean O2 as able- O2 requirement is improved, on 1L NC now.

## 2024-08-27 NOTE — ASSESSMENT & PLAN NOTE
On O2 at baseline.  Patient's COPD is with exacerbation noted by continued dyspnea and use of accessory muscles for breathing currently.    Patient is currently off COPD Pathway.   Continue scheduled inhalers, antibiotics Steroids and Supplemental oxygen and monitor respiratory status closely.   Transition steroids to PO.

## 2024-08-27 NOTE — ASSESSMENT & PLAN NOTE
"Echo    Result Date: 8/26/2024    Left Ventricle: The left ventricle is smaller than normal. There is   severe concentric hypertrophy. Regional wall motion abnormalities present.    Inferior wall hypokinesis is noted There is mildly reduced systolic   function with a visually estimated ejection fraction of 40 - 50%. Ejection   fraction by visual approximation is 45%. There is diastolic dysfunction.    Right Ventricle: Normal right ventricular cavity size.    Aortic Valve: The aortic valve is a trileaflet valve. Mildly calcified   cusps.    Tricuspid Valve: There is mild regurgitation. There is mild pulmonary   hypertension.    IVC/SVC: Normal venous pressure at 3 mmHg.    Pericardium: There is a trivial effusion.         Continue Beta Blocker and monitor clinical status closely. Monitor on telemetry. Patient is off CHF pathway.  Monitor strict Is&Os and daily weights.  Place on fluid restriction of 1.5 L. Cardiology has not been consulted. Continue to stress to patient importance of self efficacy and  on diet for CHF. Last BNP reviewed- and noted below No results for input(s): "BNP", "BNPTRIAGEBLO" in the last 168 hours.   Volume removal with HD.     "

## 2024-08-27 NOTE — SUBJECTIVE & OBJECTIVE
Interval History:  He denies chest pain.  He has baseline shortness of breath.    Review of patient's allergies indicates:  No Known Allergies  Current Facility-Administered Medications   Medication Frequency    0.9%  NaCl infusion PRN    acetaminophen tablet 650 mg Q4H PRN    albuterol-ipratropium 2.5 mg-0.5 mg/3 mL nebulizer solution 3 mL Q6H    albuterol-ipratropium 2.5 mg-0.5 mg/3 mL nebulizer solution 3 mL Q4H PRN    aspirin EC tablet 81 mg Daily    atorvastatin tablet 80 mg Daily    budesonide nebulizer solution 0.5 mg BID    carvediloL tablet 12.5 mg BID    dextrose 10% bolus 125 mL 125 mL PRN    dextrose 10% bolus 250 mL 250 mL PRN    glucagon (human recombinant) injection 1 mg PRN    glucose chewable tablet 16 g PRN    glucose chewable tablet 24 g PRN    hydrALAZINE tablet 50 mg TID    HYDROcodone-acetaminophen  mg per tablet 1 tablet Q8H PRN    levoFLOXacin 500 mg/100 mL IVPB 500 mg Q48H    magic mouthwash (diphenhydrAMINE 12.5 mg/5 mL 20 mL, aluminum & magnesium hydroxide-simethicone (MYLANTA) 20 mL, LIDOcaine HCl 2% (XYLOCAINE) 20 mL) solution Q4H PRN    melatonin tablet 6 mg Nightly PRN    methylPREDNISolone sodium succinate injection 40 mg Q6H    montelukast tablet 10 mg QHS    mupirocin 2 % ointment BID    naloxone 0.4 mg/mL injection 0.02 mg PRN    nystatin 100,000 unit/mL suspension 500,000 Units QID (WM & HS)    ondansetron injection 4 mg Q8H PRN    sevelamer carbonate tablet 800 mg TID WM    sodium chloride 0.9% flush 10 mL Q12H PRN    ticagrelor tablet 90 mg BID       Objective:     Vital Signs (Most Recent):  Temp: 98.3 °F (36.8 °C) (08/27/24 0740)  Pulse: 91 (08/27/24 0749)  Resp: 18 (08/27/24 0900)  BP: 105/63 (08/27/24 0740)  SpO2: 97 % (08/27/24 0749) Vital Signs (24h Range):  Temp:  [97.4 °F (36.3 °C)-98.6 °F (37 °C)] 98.3 °F (36.8 °C)  Pulse:  [] 91  Resp:  [12-20] 18  SpO2:  [93 %-100 %] 97 %  BP: ()/(34-72) 105/63     Weight: 70.9 kg (156 lb 4.9 oz) (08/27/24  0200)  Body mass index is 21.8 kg/m².  Body surface area is 1.88 meters squared.    I/O last 3 completed shifts:  In: 366.6 [I.V.:227.5; IV Piggyback:139.1]  Out: 200 [Urine:200]     Physical Exam  Constitutional:       General: He is not in acute distress.  HENT:      Head: Normocephalic and atraumatic.   Eyes:      Pupils: Pupils are equal, round, and reactive to light.   Cardiovascular:      Rate and Rhythm: Normal rate and regular rhythm.      Heart sounds: No murmur heard.     No friction rub. No gallop.   Pulmonary:      Breath sounds: No wheezing or rales.   Abdominal:      General: Bowel sounds are normal.      Tenderness: There is no abdominal tenderness. There is no guarding.   Musculoskeletal:      Right lower leg: No edema.      Left lower leg: No edema.   Neurological:      Mental Status: He is alert and oriented to person, place, and time.   Psychiatric:         Behavior: Behavior normal.          Significant Labs:  BMP:   Recent Labs   Lab 08/25/24  1513 08/26/24  0447   GLU 99 91    136   K 5.9* 7.2*    106   CO2 25 24   BUN 58* 72*   CREATININE 7.96* 8.67*   CALCIUM 8.6 8.9   MG 2.8*  --      CBC:   Recent Labs   Lab 08/26/24 0447   WBC 6.66   RBC 3.11*   HGB 9.2*   HCT 29.9*   PLT 66*   MCV 96.1*   MCH 29.6   MCHC 30.8*        Significant Imaging:

## 2024-08-27 NOTE — ASSESSMENT & PLAN NOTE
Presented with volume overload.   Creatine stable for now. BMP reviewed- noted Estimated Creatinine Clearance: 7.4 mL/min (A) (based on SCr of 8.67 mg/dL (H)). according to latest data. Based on current GFR, CKD stage is end stage.  Monitor UOP and serial BMP and adjust therapy as needed. Renally dose meds. Avoid nephrotoxic medications and procedures.  Dialysis Monday, Wednesday, Friday  Nephrology consulted, appreciate assistance.

## 2024-08-27 NOTE — ASSESSMENT & PLAN NOTE
8/27/2024:  - Pt with continued jaw/mandible pain present after recent bronchoscopy affecting his ability to eat  - CT ordered

## 2024-08-27 NOTE — NURSING
"2118- PTT resulted as 137.5.  2120- Infusion stopped. Pt showed no signs of active bleeding. Dr. Vega notified via secure chat. Next PTT was ordered to be drawn at 2245. Lab stuck patient once and was unsuccessful. Lab attempted to stick pt again but pt refused. He said " I don't want to be stuck anymore", and placed his arm back underneath the covers. I educated pt on the importance of having labs drawn, pt still refused. Dr. Vega notified via secure chat. New orders obtained for lovenox (see MAR). Will continue to monitor pt.  "

## 2024-08-27 NOTE — SUBJECTIVE & OBJECTIVE
Interval History: Patient seen today, denies chest pain. Continues to have complaints of left jaw pain that has been present since recent bronchoscopy that is affecting his ability to eat. CT mandible ordered. Repeat echo with mildly reduced EF 45% and new lateral WMA. Plan for [facility] today.     Review of Systems   Constitutional: Negative for chills and fever.   HENT:  Positive for sore throat (and left jaw pain).         Mouth pain   Cardiovascular:  Positive for dyspnea on exertion. Negative for chest pain and orthopnea.   Respiratory:  Positive for shortness of breath.      Objective:     Vital Signs (Most Recent):  Temp: 97.9 °F (36.6 °C) (08/27/24 1119)  Pulse: 83 (08/27/24 1119)  Resp: 19 (08/27/24 1119)  BP: 107/72 (08/27/24 1119)  SpO2: 97 % (08/27/24 1119) Vital Signs (24h Range):  Temp:  [97.4 °F (36.3 °C)-98.6 °F (37 °C)] 97.9 °F (36.6 °C)  Pulse:  [] 83  Resp:  [12-20] 19  SpO2:  [93 %-100 %] 97 %  BP: ()/(58-72) 107/72     Weight: 70.9 kg (156 lb 4.9 oz)  Body mass index is 21.8 kg/m².     SpO2: 97 %         Intake/Output Summary (Last 24 hours) at 8/27/2024 1247  Last data filed at 8/27/2024 0607  Gross per 24 hour   Intake 122.7 ml   Output --   Net 122.7 ml       Lines/Drains/Airways       Peripheral Intravenous Line  Duration                  Hemodialysis AV Fistula Right forearm -- days         Peripheral IV - Single Lumen 08/25/24 20 G Anterior;Left Forearm 2 days         Peripheral IV - Single Lumen 08/25/24 1655 20 G Other (Comments) No Anterior;Left Upper Arm 1 day                       Physical Exam  Vitals reviewed.   Constitutional:       Appearance: He is ill-appearing.   Eyes:      General: No scleral icterus.  Cardiovascular:      Rate and Rhythm: Normal rate and regular rhythm.      Heart sounds: Normal heart sounds.   Pulmonary:      Effort: Pulmonary effort is normal.      Breath sounds: Wheezing present.   Abdominal:      General: Bowel sounds are normal.      Palpations:  "Abdomen is soft.   Skin:     General: Skin is warm and dry.   Neurological:      Mental Status: He is alert and oriented to person, place, and time.            Significant Labs: ABG: No results for input(s): "PH", "PCO2", "HCO3", "POCSATURATED", "BE" in the last 48 hours., Blood Culture: No results for input(s): "LABBLOO" in the last 48 hours., BMP:   Recent Labs   Lab 08/25/24  1513 08/26/24 0447 08/27/24  1040   GLU 99 91 113*    136 142   K 5.9* 7.2* 6.0*    106 106   CO2 25 24 27   BUN 58* 72* 86*   CREATININE 7.96* 8.67* 7.06*   CALCIUM 8.6 8.9 8.5   MG 2.8*  --   --    , CMP   Recent Labs   Lab 08/25/24  1513 08/26/24 0447 08/27/24  1040    136 142   K 5.9* 7.2* 6.0*    106 106   CO2 25 24 27   GLU 99 91 113*   BUN 58* 72* 86*   CREATININE 7.96* 8.67* 7.06*   CALCIUM 8.6 8.9 8.5   PROT 5.4*  --   --    ALBUMIN 2.9*  --   --    BILITOT 0.4  --   --    ALKPHOS 65  --   --    AST 47*  --   --    ALT 52  --   --    ANIONGAP 12 13 15   , CBC   Recent Labs   Lab 08/25/24  1513 08/26/24 0447 08/27/24  1040   WBC 6.25 6.66 6.87   HGB 10.0* 9.2* 6.7*   HCT 32.9* 29.9* 21.6*   PLT 78* 66* 66*   , Lipid Panel No results for input(s): "CHOL", "HDL", "LDLCALC", "TRIG", "CHOLHDL" in the last 48 hours., and Troponin No results for input(s): "TROPONINI" in the last 48 hours.    Significant Imaging: Cardiac Cath: No results found for this or any previous visit.     , Echocardiogram: Transthoracic echo (TTE) complete (Cupid Only):   Results for orders placed or performed during the hospital encounter of 08/25/24   Echo   Result Value Ref Range    BSA 1.88 m2    A4C EF 45 %    LVOT stroke volume 40.87 cm3    LVIDd 3.64 3.5 - 6.0 cm    LV Systolic Volume 31.10 mL    LV Systolic Volume Index 16.4 mL/m2    LVIDs 2.86 2.1 - 4.0 cm    LV Diastolic Volume 55.72 mL    LV ESV A4C 41.29 mL    LV Diastolic Volume Index 29.33 mL/m2    LV EDV A4C 33.45 mL    Left Ventricular End Systolic Volume by Teichholz Method " 31.10 mL    Left Ventricular End Diastolic Volume by Teichholz Method 55.72 mL    IVS 1.89 (A) 0.6 - 1.1 cm    LVOT diameter 1.72 cm    LVOT area 2.3 cm2    FS 21 (A) 28 - 44 %    Left Ventricle Relative Wall Thickness 1.10 cm    Posterior Wall 2.01 (A) 0.6 - 1.1 cm    LV mass 317.12 g    LV Mass Index 167 g/m2    MV Peak E Abraham 0.53 m/s    TDI LATERAL 0.07 m/s    TDI SEPTAL 0.03 m/s    E/E' ratio 10.60 m/s    MV Peak A Abraham 1.50 m/s    TR Max Abraham 3.00 m/s    E/A ratio 0.35     E wave deceleration time 173.74 msec    LV SEPTAL E/E' RATIO 17.67 m/s    LV LATERAL E/E' RATIO 7.57 m/s    LVOT peak abraham 1.10 m/s    Left Ventricular Outflow Tract Mean Velocity 0.78 cm/s    Left Ventricular Outflow Tract Mean Gradient 2.75 mmHg    RV- herrera basal diam 3.6 cm    RV-herrera mid d 3.2 cm    Right ventricular length in diastole (apical 4-chamber view) 3.88 cm    RV-herrera length 3.9 cm    RV mid diameter 3.22 cm    TAPSE 1.23 cm    LA size 3.43 cm    RA Major Axis 3.88 cm    AV mean gradient 6 mmHg    AV peak gradient 13 mmHg    Ao peak abraham 1.77 m/s    Ao VTI 32.40 cm    LVOT peak VTI 17.60 cm    AV valve area 1.26 cm²    AV Velocity Ratio 0.62     AV index (prosthetic) 0.54     EUFEMIA by Velocity Ratio 1.44 cm²    MV stenosis pressure 1/2 time 50.39 ms    MV valve area p 1/2 method 4.37 cm2    Triscuspid Valve Regurgitation Peak Gradient 36 mmHg    PV PEAK VELOCITY 0.68 m/s    PV peak gradient 2 mmHg    Ao root annulus 3.43 cm    IVC diameter 1.88 cm    Mean e' 0.05 m/s    ZLVIDS -1.10     ZLVIDD -3.82     LA area A4C 15.31 cm2    AORTIC VALVE CUSP SEPERATION 1.70 cm    EF 45 %    TV resting pulmonary artery pressure 39 mmHg    RV TB RVSP 6 mmHg    Est. RA pres 3 mmHg    Narrative      Left Ventricle: The left ventricle is smaller than normal. There is   severe concentric hypertrophy. Regional wall motion abnormalities present.    Inferior wall hypokinesis is noted There is mildly reduced systolic   function with a visually estimated  ejection fraction of 40 - 50%. Ejection   fraction by visual approximation is 45%. There is diastolic dysfunction.    Right Ventricle: Normal right ventricular cavity size.    Aortic Valve: The aortic valve is a trileaflet valve. Mildly calcified   cusps.    Tricuspid Valve: There is mild regurgitation. There is mild pulmonary   hypertension.    IVC/SVC: Normal venous pressure at 3 mmHg.    Pericardium: There is a trivial effusion.     , and X-Ray: CXR: X-Ray Chest 1 View (CXR): No results found for this visit on 08/25/24.

## 2024-08-27 NOTE — ASSESSMENT & PLAN NOTE
Hyperkalemia is likely due to ESRD.The patients most recent potassium results are listed below.  Recent Labs     08/25/24  1513 08/26/24  0447   K 5.9* 7.2*     Plan  - Monitor for arrhythmias with EKG and/or continuous telemetry.   - Treat the hyperkalemia with  Dialysis .   - Monitor potassium: Daily  - The patient's hyperkalemia is improving

## 2024-08-27 NOTE — ASSESSMENT & PLAN NOTE
Patient presented with SOB, denies chest pain  Troponin trend 569 --> 553.  EKG without acute ST changes.   Echocardiogram suggestive of wall motion abnormalities, EF 45%.   Heparin drip and Brillinta started, resume aspirin, statin and beta blocker.  Cardiology consulted, awaiting recommendations with regard to ischemic evaluation.

## 2024-08-27 NOTE — ASSESSMENT & PLAN NOTE
- Patient seen and evaluated by Dr. Trujillo  - Previously followed by Dr. Santos, last seen 8/2021; LHC 2022 s/p stent RCA and 2020 s/p stents LAD & Lcx  - Troponin 569, 553; flat  - EKG SR with PAC and nonspecific TWA  - Repeat echo pending  - This is likely Type 2 MI in setting of ESRD on dialysis and heart failure, however we will review echo for any new abnormalities and decide if LHC may be warranted  - Further recommendations to follow    8/27/2024:  - Echo with mildly reduced EF 45% and new lateral wall hypokinesis  - Plan for LHC today. Procedure, risk, and benefits discussed in detail with patient. He verbalized understanding and wishes to proceed. Consent obtained and on chart.  - Further recommendations to follow

## 2024-08-27 NOTE — PROGRESS NOTES
[facility] - [address] [place] [address] Medicine  Progress Note    Patient Name: Michoacano Damon  MRN: [id number]  Patient Class: IP- Inpatient   Admission Date: 8/25/2024  Length of Stay: 2 days  Attending Physician: Joel Sellers MD  Primary Care Provider: Norma, Primary Doctor        Subjective:     Principal Problem:NSTEMI (non-ST elevated myocardial infarction)        HPI:  Patient is a 74-year-old male who presents to [facility] via EMS, [facility] with c/o Weakness and SOB.  Patient has a past medical history of COPD, CAD, ESRD on dialysis MWF and HFrEF.  Patient states he is more short of breath than usual and this started today.  He states he is unable to walk due to his shortness of breath and weakness.  Patient states he is compliant with his medications and that he does not missed dialysis.  Patient also has a complaint of mouth pain.  He points to the left lower gumline to a point of tenderness.  Patient has no teeth at all.  Patient was recently seen at [facility] pulmonology 5 days ago for checkup.  Patient denies headache, chest pain, abdominal pain or lower extremity edema.    Workup in the ED shows Troponin 569, CXR - Findings suggest mild cardiac decompensation and / or pneumonitis.    Patient will be admitted to [facility] for continued care and medical management.        Overview/Hospital Course:  8/26- Undergoing HD today, cardiology consulted, echocardiogram pending.   8/27- Echo suggestive of wall motion changes, cardiology follow, awaiting recommendations regarding ischemic eval.     Interval History: Patient seen and examined at the bedside, reports feeling short of breath but improved, no chest pain. Complains of jaw pain.     Review of Systems   HENT:          Jaw pain   Respiratory:  Positive for shortness of breath.    Cardiovascular:  Negative for chest pain.     Objective:     Vital Signs (Most Recent):  Temp: 98.3 °F  (36.8 °C) (08/27/24 0740)  Pulse: 91 (08/27/24 0749)  Resp: 18 (08/27/24 0900)  BP: 105/63 (08/27/24 0740)  SpO2: 97 % (08/27/24 0749) Vital Signs (24h Range):  Temp:  [97.4 °F (36.3 °C)-98.6 °F (37 °C)] 98.3 °F (36.8 °C)  Pulse:  [] 91  Resp:  [12-20] 18  SpO2:  [93 %-100 %] 97 %  BP: ()/(53-72) 105/63     Weight: 70.9 kg (156 lb 4.9 oz)  Body mass index is 21.8 kg/m².    Intake/Output Summary (Last 24 hours) at 8/27/2024 1103  Last data filed at 8/27/2024 0607  Gross per 24 hour   Intake 122.7 ml   Output --   Net 122.7 ml         Physical Exam  Vitals and nursing note reviewed.   Constitutional:       Appearance: He is ill-appearing.   HENT:      Head: Normocephalic and atraumatic.      Nose: Nose normal.      Mouth/Throat:      Mouth: Mucous membranes are moist.   Eyes:      Extraocular Movements: Extraocular movements intact.   Cardiovascular:      Rate and Rhythm: Normal rate and regular rhythm.      Pulses: Normal pulses.      Heart sounds: Normal heart sounds.   Pulmonary:      Effort: Pulmonary effort is normal.      Breath sounds: Rales present.      Comments: On 1L NC  Abdominal:      General: Bowel sounds are normal.      Palpations: Abdomen is soft.   Musculoskeletal:         General: Normal range of motion.      Cervical back: Normal range of motion.   Skin:     General: Skin is warm.   Neurological:      General: No focal deficit present.      Mental Status: He is alert and oriented to person, place, and time. Mental status is at baseline.   Psychiatric:         Mood and Affect: Mood normal.         Behavior: Behavior normal.             Significant Labs: All pertinent labs within the past 24 hours have been reviewed.    Significant Imaging: I have reviewed all pertinent imaging results/findings within the past 24 hours.    Assessment/Plan:      * NSTEMI (non-ST elevated myocardial infarction)  Patient presented with SOB, denies chest pain  Troponin trend 569 --> 553.  EKG without acute ST  changes.   Echocardiogram suggestive of wall motion abnormalities, EF 45%.   Heparin drip and Brillinta started, resume aspirin, statin and beta blocker.  Cardiology consulted, awaiting recommendations with regard to ischemic evaluation.         Acute respiratory failure with hypoxia and hypercarbia  Patient with Hypercapnic and Hypoxic Respiratory failure which is Acute on chronic.  he is on home oxygen at 2 LPM. Supplemental oxygen was provided and noted- Oxygen Concentration (%):  [24-94] 24    .   Signs/symptoms of respiratory failure include- tachypnea and respiratory distress. Contributing diagnoses includes - CHF and COPD Labs and images were reviewed. Patient Has recent ABG, which has been reviewed. Will treat underlying causes and adjust management of respiratory failure as follows-     Continue volume removal with HD, steroids/antibiotics.   Wean O2 as able- O2 requirement is improved, on 1L NC now.     Acute on chronic HFrEF (heart failure with reduced ejection fraction)  Echo    Result Date: 8/26/2024    Left Ventricle: The left ventricle is smaller than normal. There is   severe concentric hypertrophy. Regional wall motion abnormalities present.    Inferior wall hypokinesis is noted There is mildly reduced systolic   function with a visually estimated ejection fraction of 40 - 50%. Ejection   fraction by visual approximation is 45%. There is diastolic dysfunction.    Right Ventricle: Normal right ventricular cavity size.    Aortic Valve: The aortic valve is a trileaflet valve. Mildly calcified   cusps.    Tricuspid Valve: There is mild regurgitation. There is mild pulmonary   hypertension.    IVC/SVC: Normal venous pressure at 3 mmHg.    Pericardium: There is a trivial effusion.         Continue Beta Blocker and monitor clinical status closely. Monitor on telemetry. Patient is off CHF pathway.  Monitor strict Is&Os and daily weights.  Place on fluid restriction of 1.5 L. Cardiology has not been  "consulted. Continue to stress to patient importance of self efficacy and  on diet for CHF. Last BNP reviewed- and noted below No results for input(s): "BNP", "BNPTRIAGEBLO" in the last 168 hours.   Volume removal with HD.       ESRD (end stage renal disease)  Presented with volume overload.   Creatine stable for now. BMP reviewed- noted Estimated Creatinine Clearance: 7.4 mL/min (A) (based on SCr of 8.67 mg/dL (H)). according to latest data. Based on current GFR, CKD stage is end stage.  Monitor UOP and serial BMP and adjust therapy as needed. Renally dose meds. Avoid nephrotoxic medications and procedures.  Dialysis Monday, Wednesday, Friday  Nephrology consulted, appreciate assistance.    Hyperkalemia  Hyperkalemia is likely due to ESRD.The patients most recent potassium results are listed below.  Recent Labs     08/25/24  1513 08/26/24  0447   K 5.9* 7.2*     Plan  - Monitor for arrhythmias with EKG and/or continuous telemetry.   - Treat the hyperkalemia with  Dialysis .   - Monitor potassium: Daily  - The patient's hyperkalemia is improving            Chronic obstructive pulmonary disease with acute exacerbation  On O2 at baseline.  Patient's COPD is with exacerbation noted by continued dyspnea and use of accessory muscles for breathing currently.    Patient is currently off COPD Pathway.   Continue scheduled inhalers, antibiotics Steroids and Supplemental oxygen and monitor respiratory status closely.   Transition steroids to PO.     CAD (coronary artery disease)  Patient with known CAD s/p  unknown, patient on Brilinta , which is uncontrolled as suspect ACS on presentation.  Will continue ASA and Statin and monitor for S/Sx of angina/ACS.   Continue to monitor on telemetry.     Hypertension  Chronic, uncontrolled. Latest blood pressure and vitals reviewed-     Temp:  [97.4 °F (36.3 °C)-98.6 °F (37 °C)]   Pulse:  []   Resp:  [12-20]   BP: ()/(53-72)   SpO2:  [93 %-100 %] .   Home meds for " hypertension were reviewed and noted below.   Hypertension Medications               carvediloL (COREG) 12.5 MG tablet Take 12.5 mg by mouth 2 (two) times daily.    hydrALAZINE (APRESOLINE) 100 MG tablet Take 1 tablet (100 mg total) by mouth 3 (three) times daily.            While in the hospital, will manage blood pressure as follows; Continue home antihypertensive regimen    Will utilize p.r.n. blood pressure medication only if patient's blood pressure greater than 180/110 and he develops symptoms such as worsening chest pain or shortness of breath.      VTE Risk Mitigation (From admission, onward)           Ordered     IP VTE HIGH RISK PATIENT  Once         08/25/24 1847     Place sequential compression device  Until discontinued         08/25/24 1847                    Discharge Planning   MARCIAL: 8/29/2024     Code Status: Full Code   Is the patient medically ready for discharge?:     Reason for patient still in hospital (select all that apply): Patient trending condition and Consult recommendations  Discharge Plan A: Skilled Nursing Facility                  LEV HERNANDEZ MD  Department of Hospital Medicine   [facility] Tele[facility]

## 2024-08-27 NOTE — ASSESSMENT & PLAN NOTE
Chronic, uncontrolled. Latest blood pressure and vitals reviewed-     Temp:  [97.4 °F (36.3 °C)-98.6 °F (37 °C)]   Pulse:  []   Resp:  [12-20]   BP: ()/(53-72)   SpO2:  [93 %-100 %] .   Home meds for hypertension were reviewed and noted below.   Hypertension Medications               carvediloL (COREG) 12.5 MG tablet Take 12.5 mg by mouth 2 (two) times daily.    hydrALAZINE (APRESOLINE) 100 MG tablet Take 1 tablet (100 mg total) by mouth 3 (three) times daily.            While in the hospital, will manage blood pressure as follows; Continue home antihypertensive regimen    Will utilize p.r.n. blood pressure medication only if patient's blood pressure greater than 180/110 and he develops symptoms such as worsening chest pain or shortness of breath.

## 2024-08-27 NOTE — PLAN OF CARE
Problem: Adult Inpatient Plan of Care  Goal: Plan of Care Review  Outcome: Progressing  Goal: Patient-Specific Goal (Individualized)  Outcome: Progressing  Goal: Absence of Hospital-Acquired Illness or Injury  Outcome: Progressing  Goal: Optimal Comfort and Wellbeing  Outcome: Progressing  Goal: Readiness for Transition of Care  Outcome: Progressing     Problem: Skin Injury Risk Increased  Goal: Skin Health and Integrity  Outcome: Progressing     Problem: Hemodialysis  Goal: Safe, Effective Therapy Delivery  Outcome: Progressing  Goal: Effective Tissue Perfusion  Outcome: Progressing  Goal: Absence of Infection Signs and Symptoms  Outcome: Progressing     Problem: Gas Exchange Impaired  Goal: Optimal Gas Exchange  Outcome: Progressing

## 2024-08-27 NOTE — PROGRESS NOTES
[facility] Tele[facility]  Nephrology  Progress Note    Patient Name: Michoacano Damon  MRN: [id number]  Admission Date: 8/25/2024  Hospital Length of Stay: 2 days  Attending Provider: Joel Sellers MD   Primary Care Physician: Norma, Primary Doctor  Principal Problem:ACS (acute coronary syndrome)    Subjective:     HPI: 74-year-old man with ESRD secondary to hypertension.  He has severe COPD and requires home oxygen.  He presented with shortness of breath.  Chest x-ray shows mild volume overload.  He is also hyperkalemic.    Interval History:  He denies chest pain.  He has baseline shortness of breath.    Review of patient's allergies indicates:  No Known Allergies  Current Facility-Administered Medications   Medication Frequency    0.9%  NaCl infusion PRN    acetaminophen tablet 650 mg Q4H PRN    albuterol-ipratropium 2.5 mg-0.5 mg/3 mL nebulizer solution 3 mL Q6H    albuterol-ipratropium 2.5 mg-0.5 mg/3 mL nebulizer solution 3 mL Q4H PRN    aspirin EC tablet 81 mg Daily    atorvastatin tablet 80 mg Daily    budesonide nebulizer solution 0.5 mg BID    carvediloL tablet 12.5 mg BID    dextrose 10% bolus 125 mL 125 mL PRN    dextrose 10% bolus 250 mL 250 mL PRN    glucagon (human recombinant) injection 1 mg PRN    glucose chewable tablet 16 g PRN    glucose chewable tablet 24 g PRN    hydrALAZINE tablet 50 mg TID    HYDROcodone-acetaminophen  mg per tablet 1 tablet Q8H PRN    levoFLOXacin 500 mg/100 mL IVPB 500 mg Q48H    magic mouthwash (diphenhydrAMINE 12.5 mg/5 mL 20 mL, aluminum & magnesium hydroxide-simethicone (MYLANTA) 20 mL, LIDOcaine HCl 2% (XYLOCAINE) 20 mL) solution Q4H PRN    melatonin tablet 6 mg Nightly PRN    methylPREDNISolone sodium succinate injection 40 mg Q6H    montelukast tablet 10 mg QHS    mupirocin 2 % ointment BID    naloxone 0.4 mg/mL injection 0.02 mg PRN    nystatin 100,000 unit/mL suspension 500,000 Units QID (WM & HS)    ondansetron injection 4 mg Q8H PRN     sevelamer carbonate tablet 800 mg TID WM    sodium chloride 0.9% flush 10 mL Q12H PRN    ticagrelor tablet 90 mg BID       Objective:     Vital Signs (Most Recent):  Temp: 98.3 °F (36.8 °C) (08/27/24 0740)  Pulse: 91 (08/27/24 0749)  Resp: 18 (08/27/24 0900)  BP: 105/63 (08/27/24 0740)  SpO2: 97 % (08/27/24 0749) Vital Signs (24h Range):  Temp:  [97.4 °F (36.3 °C)-98.6 °F (37 °C)] 98.3 °F (36.8 °C)  Pulse:  [] 91  Resp:  [12-20] 18  SpO2:  [93 %-100 %] 97 %  BP: ()/(34-72) 105/63     Weight: 70.9 kg (156 lb 4.9 oz) (08/27/24 0200)  Body mass index is 21.8 kg/m².  Body surface area is 1.88 meters squared.    I/O last 3 completed shifts:  In: 366.6 [I.V.:227.5; IV Piggyback:139.1]  Out: 200 [Urine:200]     Physical Exam  Constitutional:       General: He is not in acute distress.  HENT:      Head: Normocephalic and atraumatic.   Eyes:      Pupils: Pupils are equal, round, and reactive to light.   Cardiovascular:      Rate and Rhythm: Normal rate and regular rhythm.      Heart sounds: No murmur heard.     No friction rub. No gallop.   Pulmonary:      Breath sounds: No wheezing or rales.   Abdominal:      General: Bowel sounds are normal.      Tenderness: There is no abdominal tenderness. There is no guarding.   Musculoskeletal:      Right lower leg: No edema.      Left lower leg: No edema.   Neurological:      Mental Status: He is alert and oriented to person, place, and time.   Psychiatric:         Behavior: Behavior normal.          Significant Labs:  BMP:   Recent Labs   Lab 08/25/24  1513 08/26/24  0447   GLU 99 91    136   K 5.9* 7.2*    106   CO2 25 24   BUN 58* 72*   CREATININE 7.96* 8.67*   CALCIUM 8.6 8.9   MG 2.8*  --      CBC:   Recent Labs   Lab 08/26/24  0447   WBC 6.66   RBC 3.11*   HGB 9.2*   HCT 29.9*   PLT 66*   MCV 96.1*   MCH 29.6   MCHC 30.8*        Significant Imaging:    Assessment/Plan:     Pulmonary  Chronic obstructive pulmonary disease with acute exacerbation  He has  severe COPD.  He has required multiple admissions for exacerbations recently.  He requires home O2.    Cardiac/Vascular  CAD (coronary artery disease)  He currently denies chest pain    Hypertension  Controlled    Renal/  ESRD (end stage renal disease)  Plan dialysis tomorrow        Thank you for your consult.     Smooth Herrera MD  Nephrology  [facility] - [address] [place] [address]

## 2024-08-27 NOTE — PROGRESS NOTES
[facility] Tele[facility]  Cardiology  Progress Note    Patient Name: Michoacano Damon  MRN: [id number]  Admission Date: 8/25/2024  Hospital Length of Stay: 2 days  Code Status: Full Code   Attending Physician: Joel Sellers MD   Primary Care Physician: Norma, Primary Doctor  Expected Discharge Date: 8/29/2024  Principal Problem:NSTEMI (non-ST elevated myocardial infarction)    Subjective:     Hospital Course:   No notes on file    Interval History: Patient seen today, denies chest pain. Continues to have complaints of left jaw pain that has been present since recent bronchoscopy that is affecting his ability to eat. CT mandible ordered. Repeat echo with mildly reduced EF 45% and new lateral WMA. Plan for C today.     Review of Systems   Constitutional: Negative for chills and fever.   HENT:  Positive for sore throat (and left jaw pain).         Mouth pain   Cardiovascular:  Positive for dyspnea on exertion. Negative for chest pain and orthopnea.   Respiratory:  Positive for shortness of breath.      Objective:     Vital Signs (Most Recent):  Temp: 97.9 °F (36.6 °C) (08/27/24 1119)  Pulse: 83 (08/27/24 1119)  Resp: 19 (08/27/24 1119)  BP: 107/72 (08/27/24 1119)  SpO2: 97 % (08/27/24 1119) Vital Signs (24h Range):  Temp:  [97.4 °F (36.3 °C)-98.6 °F (37 °C)] 97.9 °F (36.6 °C)  Pulse:  [] 83  Resp:  [12-20] 19  SpO2:  [93 %-100 %] 97 %  BP: ()/(58-72) 107/72     Weight: 70.9 kg (156 lb 4.9 oz)  Body mass index is 21.8 kg/m².     SpO2: 97 %         Intake/Output Summary (Last 24 hours) at 8/27/2024 1247  Last data filed at 8/27/2024 0607  Gross per 24 hour   Intake 122.7 ml   Output --   Net 122.7 ml       Lines/Drains/Airways       Peripheral Intravenous Line  Duration                  Hemodialysis AV Fistula Right forearm -- days         Peripheral IV - Single Lumen 08/25/24 20 G Anterior;Left Forearm 2 days         Peripheral IV - Single Lumen 08/25/24 1655 20 G Other (Comments) No  "Anterior;Left Upper Arm 1 day                       Physical Exam  Vitals reviewed.   Constitutional:       Appearance: He is ill-appearing.   Eyes:      General: No scleral icterus.  Cardiovascular:      Rate and Rhythm: Normal rate and regular rhythm.      Heart sounds: Normal heart sounds.   Pulmonary:      Effort: Pulmonary effort is normal.      Breath sounds: Wheezing present.   Abdominal:      General: Bowel sounds are normal.      Palpations: Abdomen is soft.   Skin:     General: Skin is warm and dry.   Neurological:      Mental Status: He is alert and oriented to person, place, and time.            Significant Labs: ABG: No results for input(s): "PH", "PCO2", "HCO3", "POCSATURATED", "BE" in the last 48 hours., Blood Culture: No results for input(s): "LABBLOO" in the last 48 hours., BMP:   Recent Labs   Lab 08/25/24 1513 08/26/24 0447 08/27/24  1040   GLU 99 91 113*    136 142   K 5.9* 7.2* 6.0*    106 106   CO2 25 24 27   BUN 58* 72* 86*   CREATININE 7.96* 8.67* 7.06*   CALCIUM 8.6 8.9 8.5   MG 2.8*  --   --    , CMP   Recent Labs   Lab 08/25/24 1513 08/26/24 0447 08/27/24  1040    136 142   K 5.9* 7.2* 6.0*    106 106   CO2 25 24 27   GLU 99 91 113*   BUN 58* 72* 86*   CREATININE 7.96* 8.67* 7.06*   CALCIUM 8.6 8.9 8.5   PROT 5.4*  --   --    ALBUMIN 2.9*  --   --    BILITOT 0.4  --   --    ALKPHOS 65  --   --    AST 47*  --   --    ALT 52  --   --    ANIONGAP 12 13 15   , CBC   Recent Labs   Lab 08/25/24 1513 08/26/24 0447 08/27/24  1040   WBC 6.25 6.66 6.87   HGB 10.0* 9.2* 6.7*   HCT 32.9* 29.9* 21.6*   PLT 78* 66* 66*   , Lipid Panel No results for input(s): "CHOL", "HDL", "LDLCALC", "TRIG", "CHOLHDL" in the last 48 hours., and Troponin No results for input(s): "TROPONINI" in the last 48 hours.    Significant Imaging: Cardiac Cath: No results found for this or any previous visit.     , Echocardiogram: Transthoracic echo (TTE) complete (Cupid Only):   Results for orders " placed or performed during the hospital encounter of 08/25/24   Echo   Result Value Ref Range    BSA 1.88 m2    A4C EF 45 %    LVOT stroke volume 40.87 cm3    LVIDd 3.64 3.5 - 6.0 cm    LV Systolic Volume 31.10 mL    LV Systolic Volume Index 16.4 mL/m2    LVIDs 2.86 2.1 - 4.0 cm    LV Diastolic Volume 55.72 mL    LV ESV A4C 41.29 mL    LV Diastolic Volume Index 29.33 mL/m2    LV EDV A4C 33.45 mL    Left Ventricular End Systolic Volume by Teichholz Method 31.10 mL    Left Ventricular End Diastolic Volume by Teichholz Method 55.72 mL    IVS 1.89 (A) 0.6 - 1.1 cm    LVOT diameter 1.72 cm    LVOT area 2.3 cm2    FS 21 (A) 28 - 44 %    Left Ventricle Relative Wall Thickness 1.10 cm    Posterior Wall 2.01 (A) 0.6 - 1.1 cm    LV mass 317.12 g    LV Mass Index 167 g/m2    MV Peak E Abraham 0.53 m/s    TDI LATERAL 0.07 m/s    TDI SEPTAL 0.03 m/s    E/E' ratio 10.60 m/s    MV Peak A Abraham 1.50 m/s    TR Max Abraham 3.00 m/s    E/A ratio 0.35     E wave deceleration time 173.74 msec    LV SEPTAL E/E' RATIO 17.67 m/s    LV LATERAL E/E' RATIO 7.57 m/s    LVOT peak abraham 1.10 m/s    Left Ventricular Outflow Tract Mean Velocity 0.78 cm/s    Left Ventricular Outflow Tract Mean Gradient 2.75 mmHg    RV- herrera basal diam 3.6 cm    RV-herrera mid d 3.2 cm    Right ventricular length in diastole (apical 4-chamber view) 3.88 cm    RV-herrera length 3.9 cm    RV mid diameter 3.22 cm    TAPSE 1.23 cm    LA size 3.43 cm    RA Major Axis 3.88 cm    AV mean gradient 6 mmHg    AV peak gradient 13 mmHg    Ao peak abraham 1.77 m/s    Ao VTI 32.40 cm    LVOT peak VTI 17.60 cm    AV valve area 1.26 cm²    AV Velocity Ratio 0.62     AV index (prosthetic) 0.54     EUFEMIA by Velocity Ratio 1.44 cm²    MV stenosis pressure 1/2 time 50.39 ms    MV valve area p 1/2 method 4.37 cm2    Triscuspid Valve Regurgitation Peak Gradient 36 mmHg    PV PEAK VELOCITY 0.68 m/s    PV peak gradient 2 mmHg    Ao root annulus 3.43 cm    IVC diameter 1.88 cm    Mean e' 0.05 m/s    ZLVIDS -1.10      ZLVIDD -3.82     LA area A4C 15.31 cm2    AORTIC VALVE CUSP SEPERATION 1.70 cm    EF 45 %    TV resting pulmonary artery pressure 39 mmHg    RV TB RVSP 6 mmHg    Est. RA pres 3 mmHg    Narrative      Left Ventricle: The left ventricle is smaller than normal. There is   severe concentric hypertrophy. Regional wall motion abnormalities present.    Inferior wall hypokinesis is noted There is mildly reduced systolic   function with a visually estimated ejection fraction of 40 - 50%. Ejection   fraction by visual approximation is 45%. There is diastolic dysfunction.    Right Ventricle: Normal right ventricular cavity size.    Aortic Valve: The aortic valve is a trileaflet valve. Mildly calcified   cusps.    Tricuspid Valve: There is mild regurgitation. There is mild pulmonary   hypertension.    IVC/SVC: Normal venous pressure at 3 mmHg.    Pericardium: There is a trivial effusion.     , and X-Ray: CXR: X-Ray Chest 1 View (CXR): No results found for this visit on 08/25/24.  Assessment and Plan:     Brief HPI:   75 y/o male with PMH of CAD multivessel, CHF, ESRD on dialysis, COPD who presents to [facility] via EMS, [facility] Rehab with c/o Weakness and SOB. Patient states he is more short of breath than usual and this started today.  He states he is unable to walk due to his shortness of breath and weakness.  Patient states he is compliant with his medications and that he does not missed dialysis.  Patient also has a complaint of mouth pain.  He points to the left lower gumline to a point of tenderness.  Patient has no teeth at all.  Patient was recently seen at [facility] pulmonology 5 days ago for checkup.  Patient denies headache, chest pain, abdominal pain or lower extremity edema.     Cardiology consulted for NSTEMI. Troponin 569, 553. EKG SR with PACs and nonspecific TWA. Repeat echo pending. CXR with mild cardiac decompensation and / or pneumonitis. Most recent C was in 2022 s/p  PCI of dist RCA and then in 2021 s/p PCI of LAD & Lcx. Last seen by Dr. Santos in 8/2021. Patient seen while in dialysis today. He denies any chest pain. His main complaint today is mouth pain and difficulty swallowing his food.       * NSTEMI (non-ST elevated myocardial infarction)  - Patient seen and evaluated by Dr. Trujillo  - Previously followed by Dr. Santos, last seen 8/2021; LHC 2022 s/p stent RCA and 2020 s/p stents LAD & Lcx  - Troponin 569, 553; flat  - EKG SR with PAC and nonspecific TWA  - Repeat echo pending  - This is likely Type 2 MI in setting of ESRD on dialysis and heart failure, however we will review echo for any new abnormalities and decide if LHC may be warranted  - Further recommendations to follow    8/27/2024:  - Echo with mildly reduced EF 45% and new lateral wall hypokinesis  - Plan for LHC today. Procedure, risk, and benefits discussed in detail with patient. He verbalized understanding and wishes to proceed. Consent obtained and on chart.  - Further recommendations to follow    Jaw pain  8/27/2024:  - Pt with continued jaw/mandible pain present after recent bronchoscopy affecting his ability to eat  - CT ordered    Hyperkalemia  - Potassium 7.2 this morning, currently being dialyzed  - Being followed by primary team    8/27/2024:  - Potassium 6.0 today; Lokelma ordered by primary team and dialysis tomorrow    Chronic obstructive pulmonary disease with acute exacerbation  - Followed by pulmonology    ESRD (end stage renal disease)  - On dialysis        VTE Risk Mitigation (From admission, onward)           Ordered     IP VTE HIGH RISK PATIENT  Once         08/25/24 1847     Place sequential compression device  Until discontinued         08/25/24 1847                    ANDRÉS Smith  Cardiology  [facility] - [address] [place] [address]

## 2024-08-28 PROBLEM — D63.1 ANEMIA OF RENAL DISEASE: Status: ACTIVE | Noted: 2024-08-28

## 2024-08-28 PROBLEM — B37.0 THRUSH: Status: ACTIVE | Noted: 2024-08-28

## 2024-08-28 PROBLEM — N18.9 ANEMIA OF RENAL DISEASE: Status: ACTIVE | Noted: 2024-08-28

## 2024-08-28 PROBLEM — I21.A1 TYPE 2 MI (MYOCARDIAL INFARCTION): Status: ACTIVE | Noted: 2024-08-25

## 2024-08-28 PROBLEM — D69.6 THROMBOCYTOPENIA: Status: ACTIVE | Noted: 2024-08-28

## 2024-08-28 LAB
ABO + RH BLD: NORMAL
ABO + RH BLD: NORMAL
ANION GAP SERPL CALCULATED.3IONS-SCNC: 17 MMOL/L (ref 7–16)
ANISOCYTOSIS BLD QL SMEAR: ABNORMAL
BASOPHILS # BLD AUTO: 0 K/UL (ref 0–0.2)
BASOPHILS NFR BLD AUTO: 0 % (ref 0–1)
BLD PROD TYP BPU: NORMAL
BLD PROD TYP BPU: NORMAL
BLOOD UNIT EXPIRATION DATE: NORMAL
BLOOD UNIT EXPIRATION DATE: NORMAL
BLOOD UNIT TYPE CODE: 7300
BLOOD UNIT TYPE CODE: 7300
BUN SERPL-MCNC: 113 MG/DL (ref 7–18)
BUN/CREAT SERPL: 14 (ref 6–20)
CALCIUM SERPL-MCNC: 8.7 MG/DL (ref 8.5–10.1)
CHLORIDE SERPL-SCNC: 103 MMOL/L (ref 98–107)
CO2 SERPL-SCNC: 27 MMOL/L (ref 21–32)
CREAT SERPL-MCNC: 7.96 MG/DL (ref 0.7–1.3)
CROSSMATCH INTERPRETATION: NORMAL
CROSSMATCH INTERPRETATION: NORMAL
DIFFERENTIAL METHOD BLD: ABNORMAL
DISPENSE STATUS: NORMAL
DISPENSE STATUS: NORMAL
EGFR (NO RACE VARIABLE) (RUSH/TITUS): 7 ML/MIN/1.73M2
EOSINOPHIL # BLD AUTO: 0 K/UL (ref 0–0.5)
EOSINOPHIL NFR BLD AUTO: 0 % (ref 1–4)
ERYTHROCYTE [DISTWIDTH] IN BLOOD BY AUTOMATED COUNT: 17.7 % (ref 11.5–14.5)
GLUCOSE SERPL-MCNC: 115 MG/DL (ref 74–106)
GLUCOSE SERPL-MCNC: 142 MG/DL (ref 70–105)
HCT VFR BLD AUTO: 29 % (ref 40–54)
HCT VFR BLD AUTO: 30.7 % (ref 40–54)
HGB BLD-MCNC: 10 G/DL (ref 13.5–18)
HGB BLD-MCNC: 9.1 G/DL (ref 13.5–18)
IMM GRANULOCYTES # BLD AUTO: 0.14 K/UL (ref 0–0.04)
IMM GRANULOCYTES NFR BLD: 1.8 % (ref 0–0.4)
LYMPHOCYTES # BLD AUTO: 0.28 K/UL (ref 1–4.8)
LYMPHOCYTES NFR BLD AUTO: 3.6 % (ref 27–41)
MCH RBC QN AUTO: 30 PG (ref 27–31)
MCHC RBC AUTO-ENTMCNC: 32.6 G/DL (ref 32–36)
MCV RBC AUTO: 92.2 FL (ref 80–96)
MONOCYTES # BLD AUTO: 0.49 K/UL (ref 0–0.8)
MONOCYTES NFR BLD AUTO: 6.3 % (ref 2–6)
MPC BLD CALC-MCNC: ABNORMAL G/DL
NEUTROPHILS # BLD AUTO: 6.81 K/UL (ref 1.8–7.7)
NEUTROPHILS NFR BLD AUTO: 88.3 % (ref 53–65)
NRBC # BLD AUTO: 0.02 X10E3/UL
NRBC, AUTO (.00): 0.3 %
OVALOCYTES BLD QL SMEAR: ABNORMAL
PLATELET # BLD AUTO: 54 K/UL (ref 150–400)
PLATELET MORPHOLOGY: ABNORMAL
POTASSIUM SERPL-SCNC: 5 MMOL/L (ref 3.5–5.1)
POTASSIUM SERPL-SCNC: 6.3 MMOL/L (ref 3.5–5.1)
RBC # BLD AUTO: 3.33 M/UL (ref 4.6–6.2)
SODIUM SERPL-SCNC: 141 MMOL/L (ref 136–145)
UNIT NUMBER: NORMAL
UNIT NUMBER: NORMAL
WBC # BLD AUTO: 7.72 K/UL (ref 4.5–11)

## 2024-08-28 PROCEDURE — 99233 SBSQ HOSP IP/OBS HIGH 50: CPT | Mod: ,,, | Performed by: NURSE PRACTITIONER

## 2024-08-28 PROCEDURE — 25000242 PHARM REV CODE 250 ALT 637 W/ HCPCS: Performed by: INTERNAL MEDICINE

## 2024-08-28 PROCEDURE — 84132 ASSAY OF SERUM POTASSIUM: CPT | Performed by: INTERNAL MEDICINE

## 2024-08-28 PROCEDURE — 36415 COLL VENOUS BLD VENIPUNCTURE: CPT | Performed by: INTERNAL MEDICINE

## 2024-08-28 PROCEDURE — 97162 PT EVAL MOD COMPLEX 30 MIN: CPT

## 2024-08-28 PROCEDURE — 82962 GLUCOSE BLOOD TEST: CPT

## 2024-08-28 PROCEDURE — 25000003 PHARM REV CODE 250: Performed by: INTERNAL MEDICINE

## 2024-08-28 PROCEDURE — P9016 RBC LEUKOCYTES REDUCED: HCPCS | Performed by: INTERNAL MEDICINE

## 2024-08-28 PROCEDURE — 63600175 PHARM REV CODE 636 W HCPCS: Performed by: INTERNAL MEDICINE

## 2024-08-28 PROCEDURE — 99233 SBSQ HOSP IP/OBS HIGH 50: CPT | Mod: GC,,, | Performed by: INTERNAL MEDICINE

## 2024-08-28 PROCEDURE — 85018 HEMOGLOBIN: CPT | Performed by: INTERNAL MEDICINE

## 2024-08-28 PROCEDURE — 80048 BASIC METABOLIC PNL TOTAL CA: CPT

## 2024-08-28 PROCEDURE — 85014 HEMATOCRIT: CPT | Performed by: INTERNAL MEDICINE

## 2024-08-28 PROCEDURE — 97165 OT EVAL LOW COMPLEX 30 MIN: CPT

## 2024-08-28 PROCEDURE — 99900035 HC TECH TIME PER 15 MIN (STAT)

## 2024-08-28 PROCEDURE — 11000001 HC ACUTE MED/SURG PRIVATE ROOM

## 2024-08-28 PROCEDURE — 27000221 HC OXYGEN, UP TO 24 HOURS

## 2024-08-28 PROCEDURE — 94640 AIRWAY INHALATION TREATMENT: CPT

## 2024-08-28 PROCEDURE — 85025 COMPLETE CBC W/AUTO DIFF WBC: CPT | Performed by: INTERNAL MEDICINE

## 2024-08-28 PROCEDURE — 94761 N-INVAS EAR/PLS OXIMETRY MLT: CPT

## 2024-08-28 PROCEDURE — 25000003 PHARM REV CODE 250

## 2024-08-28 RX ADMIN — IPRATROPIUM BROMIDE AND ALBUTEROL SULFATE 3 ML: 2.5; .5 SOLUTION RESPIRATORY (INHALATION) at 12:08

## 2024-08-28 RX ADMIN — MUPIROCIN: 20 OINTMENT TOPICAL at 08:08

## 2024-08-28 RX ADMIN — IPRATROPIUM BROMIDE AND ALBUTEROL SULFATE 3 ML: .5; 3 SOLUTION RESPIRATORY (INHALATION) at 05:08

## 2024-08-28 RX ADMIN — BUDESONIDE 0.5 MG: 0.5 INHALANT RESPIRATORY (INHALATION) at 07:08

## 2024-08-28 RX ADMIN — IPRATROPIUM BROMIDE AND ALBUTEROL SULFATE 3 ML: 2.5; .5 SOLUTION RESPIRATORY (INHALATION) at 07:08

## 2024-08-28 RX ADMIN — PREDNISONE 40 MG: 20 TABLET ORAL at 08:08

## 2024-08-28 RX ADMIN — CARVEDILOL 12.5 MG: 12.5 TABLET, FILM COATED ORAL at 08:08

## 2024-08-28 RX ADMIN — ASPIRIN 81 MG: 81 TABLET, COATED ORAL at 08:08

## 2024-08-28 RX ADMIN — TICAGRELOR 90 MG: 90 TABLET ORAL at 08:08

## 2024-08-28 RX ADMIN — SEVELAMER CARBONATE 800 MG: 800 TABLET, FILM COATED ORAL at 08:08

## 2024-08-28 RX ADMIN — HYDROCODONE BITARTRATE AND ACETAMINOPHEN 1 TABLET: 10; 325 TABLET ORAL at 08:08

## 2024-08-28 RX ADMIN — ALUMINUM HYDROXIDE, MAGNESIUM HYDROXIDE, AND SIMETHICONE 15 ML: 200; 200; 20 SUSPENSION ORAL at 06:08

## 2024-08-28 RX ADMIN — NYSTATIN 500000 UNITS: 100000 SUSPENSION ORAL at 08:08

## 2024-08-28 RX ADMIN — MONTELUKAST 10 MG: 10 TABLET, FILM COATED ORAL at 08:08

## 2024-08-28 RX ADMIN — HYDRALAZINE HYDROCHLORIDE 50 MG: 50 TABLET ORAL at 08:08

## 2024-08-28 RX ADMIN — ALUMINUM HYDROXIDE, MAGNESIUM HYDROXIDE, AND SIMETHICONE 15 ML: 200; 200; 20 SUSPENSION ORAL at 05:08

## 2024-08-28 RX ADMIN — ATORVASTATIN CALCIUM 80 MG: 80 TABLET, FILM COATED ORAL at 08:08

## 2024-08-28 RX ADMIN — Medication 6 MG: at 08:08

## 2024-08-28 RX ADMIN — SODIUM CHLORIDE: 9 INJECTION, SOLUTION INTRAVENOUS at 03:08

## 2024-08-28 RX ADMIN — NYSTATIN 500000 UNITS: 100000 SUSPENSION ORAL at 01:08

## 2024-08-28 RX ADMIN — SEVELAMER CARBONATE 800 MG: 800 TABLET, FILM COATED ORAL at 05:08

## 2024-08-28 RX ADMIN — IPRATROPIUM BROMIDE AND ALBUTEROL SULFATE 3 ML: 2.5; .5 SOLUTION RESPIRATORY (INHALATION) at 01:08

## 2024-08-28 RX ADMIN — SODIUM CHLORIDE 2000 ML: 9 INJECTION, SOLUTION INTRAVENOUS at 12:08

## 2024-08-28 RX ADMIN — BUDESONIDE 0.5 MG: 0.5 INHALANT RESPIRATORY (INHALATION) at 08:08

## 2024-08-28 NOTE — PROGRESS NOTES
[facility] - [address] [place] [address] Medicine  Progress Note    Patient Name: Michoacano Damon  MRN: [id number]  Patient Class: IP- Inpatient   Admission Date: 8/25/2024  Length of Stay: 3 days  Attending Physician: Joel Sellers MD  Primary Care Provider: Norma, Primary Doctor        Subjective:     Principal Problem:NSTEMI (non-ST elevated myocardial infarction)        HPI:  Patient is a 74-year-old male who presents to [facility] via EMS, [facility] and Rehab with c/o Weakness and SOB.  Patient has a past medical history of COPD, CAD, ESRD on dialysis MWF and HFrEF.  Patient states he is more short of breath than usual and this started today.  He states he is unable to walk due to his shortness of breath and weakness.  Patient states he is compliant with his medications and that he does not missed dialysis.  Patient also has a complaint of mouth pain.  He points to the left lower gumline to a point of tenderness.  Patient has no teeth at all.  Patient was recently seen at [facility] pulmonology 5 days ago for checkup.  Patient denies headache, chest pain, abdominal pain or lower extremity edema.    Workup in the ED shows Troponin 569, CXR - Findings suggest mild cardiac decompensation and / or pneumonitis.    Patient will be admitted to [facility] for continued care and medical management.        Overview/Hospital Course:  8/26- Undergoing HD today, cardiology consulted, echocardiogram pending.   8/27- Echo suggestive of wall motion changes, cardiology follow, awaiting recommendations regarding ischemic eval.   8/28 s/p LHC yesterday which showed 65% instent restenosis in Left Circ- medical management recommended. Hgb noted to be 2 points or so lower all of a sudden, getting transfusion.     Interval History: Patient seen and examined at the bedside, complains of sore throat.     Review of Systems   HENT:  Positive for mouth sores.         Jaw pain    Respiratory:  Negative for shortness of breath.    Cardiovascular:  Negative for chest pain.     Objective:     Vital Signs (Most Recent):  Temp: 98 °F (36.7 °C) (08/28/24 0930)  Pulse: 104 (08/28/24 1030)  Resp: 20 (08/28/24 1030)  BP: (!) 133/56 (08/28/24 1030)  SpO2: 100 % (08/28/24 0756) Vital Signs (24h Range):  Temp:  [97.2 °F (36.2 °C)-98 °F (36.7 °C)] 98 °F (36.7 °C)  Pulse:  [] 104  Resp:  [16-22] 20  SpO2:  [97 %-100 %] 100 %  BP: (106-154)/(56-84) 133/56     Weight: 71.7 kg (158 lb 1.6 oz)  Body mass index is 22.05 kg/m².    Intake/Output Summary (Last 24 hours) at 8/28/2024 1132  Last data filed at 8/28/2024 0223  Gross per 24 hour   Intake 300 ml   Output --   Net 300 ml         Physical Exam  Vitals and nursing note reviewed.   Constitutional:       Appearance: He is ill-appearing.   HENT:      Head: Normocephalic and atraumatic.      Nose: Nose normal.      Mouth/Throat:      Mouth: Mucous membranes are dry. Oral lesions present.   Eyes:      Extraocular Movements: Extraocular movements intact.   Cardiovascular:      Rate and Rhythm: Normal rate and regular rhythm.      Pulses: Normal pulses.      Heart sounds: Normal heart sounds.   Pulmonary:      Effort: Pulmonary effort is normal.      Breath sounds: Wheezing present.      Comments: On 1L NC  Abdominal:      General: Bowel sounds are normal.      Palpations: Abdomen is soft.   Musculoskeletal:         General: Normal range of motion.      Cervical back: Normal range of motion.   Skin:     General: Skin is warm.   Neurological:      General: No focal deficit present.      Mental Status: He is alert and oriented to person, place, and time. Mental status is at baseline.   Psychiatric:         Mood and Affect: Mood normal.         Behavior: Behavior normal.             Significant Labs: All pertinent labs within the past 24 hours have been reviewed.    Significant Imaging: I have reviewed all pertinent imaging results/findings within the past  24 hours.    Assessment/Plan:      * NSTEMI (non-ST elevated myocardial infarction)  Patient presented with SOB, denies chest pain  Troponin trend 569 --> 553.  EKG without acute ST changes.   Echocardiogram suggestive of wall motion abnormalities, EF 45%.   Heparin drip and Brillinta started, resume aspirin, statin and beta blocker.  Cardiology consulted, s/p LHC which showed 65% in-stent restenosis- medical management recommended.        Acute respiratory failure with hypoxia and hypercarbia  Patient with Hypercapnic and Hypoxic Respiratory failure which is Acute on chronic.  he is on home oxygen at 2 LPM. Supplemental oxygen was provided and noted- Oxygen Concentration (%):  [24-28] 24    .   Signs/symptoms of respiratory failure include- tachypnea and respiratory distress. Contributing diagnoses includes - CHF and COPD Labs and images were reviewed. Patient Has recent ABG, which has been reviewed. Will treat underlying causes and adjust management of respiratory failure as follows-     Continue volume removal with HD, steroids/antibiotics.   Wean O2 as able- O2 requirement is improved, on 1L NC now.     Acute on chronic HFrEF (heart failure with reduced ejection fraction)  Echo    Result Date: 8/26/2024    Left Ventricle: The left ventricle is smaller than normal. There is   severe concentric hypertrophy. Regional wall motion abnormalities present.    Inferior wall hypokinesis is noted There is mildly reduced systolic   function with a visually estimated ejection fraction of 40 - 50%. Ejection   fraction by visual approximation is 45%. There is diastolic dysfunction.    Right Ventricle: Normal right ventricular cavity size.    Aortic Valve: The aortic valve is a trileaflet valve. Mildly calcified   cusps.    Tricuspid Valve: There is mild regurgitation. There is mild pulmonary   hypertension.    IVC/SVC: Normal venous pressure at 3 mmHg.    Pericardium: There is a trivial effusion.         Continue Beta Blocker  "and monitor clinical status closely. Monitor on telemetry. Patient is off CHF pathway.  Monitor strict Is&Os and daily weights.  Place on fluid restriction of 1.5 L. Cardiology has not been consulted. Continue to stress to patient importance of self efficacy and  on diet for CHF. Last BNP reviewed- and noted below No results for input(s): "BNP", "BNPTRIAGEBLO" in the last 168 hours.   Volume removal with HD.       ESRD (end stage renal disease)  Presented with volume overload.   Creatine stable for now. BMP reviewed- noted Estimated Creatinine Clearance: 8.1 mL/min (A) (based on SCr of 7.96 mg/dL (H)). according to latest data. Based on current GFR, CKD stage is end stage.  Monitor UOP and serial BMP and adjust therapy as needed. Renally dose meds. Avoid nephrotoxic medications and procedures.  Dialysis Monday, Wednesday, Friday  Nephrology consulted, appreciate assistance.    Hyperkalemia  Hyperkalemia is likely due to ESRD.The patients most recent potassium results are listed below.  Recent Labs     08/27/24  1040 08/27/24  1800 08/28/24  0901   K 6.0* 5.7* 6.3*       Plan  - Monitor for arrhythmias with EKG and/or continuous telemetry.   - Treat the hyperkalemia with  Dialysis .   - Monitor potassium: Daily  - The patient's hyperkalemia is improving            Chronic obstructive pulmonary disease with acute exacerbation  On O2 at baseline.  Patient's COPD is with exacerbation noted by continued dyspnea and use of accessory muscles for breathing currently.    Patient is currently off COPD Pathway.   Continue scheduled inhalers, antibiotics Steroids and Supplemental oxygen and monitor respiratory status closely.   Transition steroids to PO.     Thrombocytopenia  The likely etiology of thrombocytopenia is  could be from DAPT vs hematologic process, ?HIT . The patients 3 most recent labs are listed below.  Recent Labs     08/26/24  0447 08/27/24  1040 08/28/24  0901   PLT 66* 66* 54*     Plan  - Will " transfuse if platelet count is <50k (if undergoing surgical procedure or have active bleeding).  - Expand workup if Plt continue to drop.       Anemia of renal disease  Anemia is likely due to chronic disease due to ESRD. Cannot rule out acute blood loss given drop of > 2 points in Hgb over last 24-48 hours.   Most recent hemoglobin and hematocrit are listed below.  Recent Labs     08/27/24  1040 08/27/24  1800 08/28/24  0901   HGB 6.7* 6.4* 10.0*   HCT 21.6* 20.3* 30.7*     Plan  - Monitor serial CBC: Daily  - Transfuse PRBC if patient becomes hemodynamically unstable, symptomatic or H/H drops below 7/21.  - Patient has received 2 units of PRBCs on 8/28  - Patient's anemia is currently improving  - Check stool for occult blood.     Thrush  Nystatin swish and swallow.  Magic mouth wash and chlorseptic spray PRN.       Wall motion abnormality of inferior wall of left ventricle  As above.       Jaw pain  CT M/F without abnormality.       CAD (coronary artery disease)  Patient with known CAD s/p  unknown, patient on Brilinta , which is uncontrolled as suspect ACS on presentation.  Will continue ASA and Statin and monitor for S/Sx of angina/ACS.   Continue to monitor on telemetry.     Hypertension  Chronic, uncontrolled. Latest blood pressure and vitals reviewed-     Temp:  [97.2 °F (36.2 °C)-98 °F (36.7 °C)]   Pulse:  []   Resp:  [16-22]   BP: (106-154)/(56-84)   SpO2:  [97 %-100 %] .   Home meds for hypertension were reviewed and noted below.   Hypertension Medications               carvediloL (COREG) 12.5 MG tablet Take 12.5 mg by mouth 2 (two) times daily.    hydrALAZINE (APRESOLINE) 100 MG tablet Take 1 tablet (100 mg total) by mouth 3 (three) times daily.            While in the hospital, will manage blood pressure as follows; Continue home antihypertensive regimen    Will utilize p.r.n. blood pressure medication only if patient's blood pressure greater than 180/110 and he develops symptoms such as worsening  chest pain or shortness of breath.      VTE Risk Mitigation (From admission, onward)           Ordered     IP VTE HIGH RISK PATIENT  Once         08/25/24 1847     Place sequential compression device  Until discontinued         08/25/24 1847                    Discharge Planning   MARCIAL: 8/29/2024     Code Status: Full Code   Is the patient medically ready for discharge?:     Reason for patient still in hospital (select all that apply): Patient trending condition and Consult recommendations  Discharge Plan A: Skilled Nursing Facility                  LEV HERNANDEZ MD  Department of Hospital Medicine   [facility]

## 2024-08-28 NOTE — ASSESSMENT & PLAN NOTE
Hyperkalemia is likely due to ESRD.The patients most recent potassium results are listed below.  Recent Labs     08/27/24  1040 08/27/24  1800 08/28/24  0901   K 6.0* 5.7* 6.3*       Plan  - Monitor for arrhythmias with EKG and/or continuous telemetry.   - Treat the hyperkalemia with  Dialysis .   - Monitor potassium: Daily  - The patient's hyperkalemia is improving

## 2024-08-28 NOTE — ASSESSMENT & PLAN NOTE
Patient presented with SOB, denies chest pain  Troponin trend 569 --> 553.  EKG without acute ST changes.   Echocardiogram suggestive of wall motion abnormalities, EF 45%.   Heparin drip and Brillinta started, resume aspirin, statin and beta blocker.  Cardiology consulted, s/p [facility] which showed 65% in-stent restenosis- medical management recommended.

## 2024-08-28 NOTE — ASSESSMENT & PLAN NOTE
The likely etiology of thrombocytopenia is  could be from DAPT vs hematologic process, ?HIT . The patients 3 most recent labs are listed below.  Recent Labs     08/26/24  0447 08/27/24  1040 08/28/24  0901   PLT 66* 66* 54*     Plan  - Will transfuse if platelet count is <50k (if undergoing surgical procedure or have active bleeding).  - Expand workup if Plt continue to drop.

## 2024-08-28 NOTE — ASSESSMENT & PLAN NOTE
8/28/2024:  - Platelet count 54 today, on ASA & Brilinta  - Discussed with Dr. Snatos, discontinue ASA 81, continue Brilinta  - Being followed by primary team

## 2024-08-28 NOTE — SUBJECTIVE & OBJECTIVE
Interval History: Patient seen and examined at the bedside, complains of sore throat.     Review of Systems   HENT:  Positive for mouth sores.         Jaw pain   Respiratory:  Negative for shortness of breath.    Cardiovascular:  Negative for chest pain.     Objective:     Vital Signs (Most Recent):  Temp: 98 °F (36.7 °C) (08/28/24 0930)  Pulse: 104 (08/28/24 1030)  Resp: 20 (08/28/24 1030)  BP: (!) 133/56 (08/28/24 1030)  SpO2: 100 % (08/28/24 0756) Vital Signs (24h Range):  Temp:  [97.2 °F (36.2 °C)-98 °F (36.7 °C)] 98 °F (36.7 °C)  Pulse:  [] 104  Resp:  [16-22] 20  SpO2:  [97 %-100 %] 100 %  BP: (106-154)/(56-84) 133/56     Weight: 71.7 kg (158 lb 1.6 oz)  Body mass index is 22.05 kg/m².    Intake/Output Summary (Last 24 hours) at 8/28/2024 1132  Last data filed at 8/28/2024 0223  Gross per 24 hour   Intake 300 ml   Output --   Net 300 ml         Physical Exam  Vitals and nursing note reviewed.   Constitutional:       Appearance: He is ill-appearing.   HENT:      Head: Normocephalic and atraumatic.      Nose: Nose normal.      Mouth/Throat:      Mouth: Mucous membranes are dry. Oral lesions present.   Eyes:      Extraocular Movements: Extraocular movements intact.   Cardiovascular:      Rate and Rhythm: Normal rate and regular rhythm.      Pulses: Normal pulses.      Heart sounds: Normal heart sounds.   Pulmonary:      Effort: Pulmonary effort is normal.      Breath sounds: Wheezing present.      Comments: On 1L NC  Abdominal:      General: Bowel sounds are normal.      Palpations: Abdomen is soft.   Musculoskeletal:         General: Normal range of motion.      Cervical back: Normal range of motion.   Skin:     General: Skin is warm.   Neurological:      General: No focal deficit present.      Mental Status: He is alert and oriented to person, place, and time. Mental status is at baseline.   Psychiatric:         Mood and Affect: Mood normal.         Behavior: Behavior normal.             Significant Labs:  All pertinent labs within the past 24 hours have been reviewed.    Significant Imaging: I have reviewed all pertinent imaging results/findings within the past 24 hours.

## 2024-08-28 NOTE — PLAN OF CARE
Problem: Occupational Therapy  Goal: Occupational Therapy Goal  Description: STG:  Pt will perform grooming with setup  Pt will bathe with Morena with setup at EOB  Pt will perform UE dressing with Eneida  Pt will perform LE dressing with Morena  Pt will sit EOB x 10 min with SBA  Pt will transfer bed/chair/bsc with Morena with RW  Pt will perform standing task x 2 min with Morena with RW   Pt will tolerate 15 minutes of tx without fatigue      LT.Restore to max I with self care and mobility.      Outcome: Progressing

## 2024-08-28 NOTE — ASSESSMENT & PLAN NOTE
- Patient seen and evaluated by Dr. Trujillo  - Previously followed by Dr. Santos, last seen 8/2021; LHC 2022 s/p stent RCA and 2020 s/p stents LAD & Lcx  - Troponin 569, 553; flat  - EKG SR with PAC and nonspecific TWA  - Repeat echo pending  - This is likely Type 2 MI in setting of ESRD on dialysis and heart failure, however we will review echo for any new abnormalities and decide if LHC may be warranted  - Further recommendations to follow    8/27/2024:  - Echo with mildly reduced EF 45% and new lateral wall hypokinesis  - Plan for LHC today. Procedure, risk, and benefits discussed in detail with patient. He verbalized understanding and wishes to proceed. Consent obtained and on chart.  - Further recommendations to follow    8/28/2024:  - S/P LHC with patent LAD stent and 65% in-stent restenosis distal Lcx stent  - Type 2 MI secondary to ESRD and acute on chronic heart failure  - Recommend continue medical management  - Statin, coreg, and brilinta (discussed with Dr. Santos, discontinuing ASA due to platelet ct 54 today, continue monitoring closely)  - Cardiology will sign off at this time, please call if any further assistance is needed  - Follow up with cardiology in 2 weeks, appt scheduled

## 2024-08-28 NOTE — ASSESSMENT & PLAN NOTE
"Echo    Result Date: 8/26/2024    Left Ventricle: The left ventricle is smaller than normal. There is   severe concentric hypertrophy. Regional wall motion abnormalities present.    Inferior wall hypokinesis is noted There is mildly reduced systolic   function with a visually estimated ejection fraction of 40 - 50%. Ejection   fraction by visual approximation is 45%. There is diastolic dysfunction.    Right Ventricle: Normal right ventricular cavity size.    Aortic Valve: The aortic valve is a trileaflet valve. Mildly calcified   cusps.    Tricuspid Valve: There is mild regurgitation. There is mild pulmonary   hypertension.    IVC/SVC: Normal venous pressure at 3 mmHg.    Pericardium: There is a trivial effusion.         Continue Beta Blocker and monitor clinical status closely. Monitor on telemetry. Patient is off CHF pathway.  Monitor strict Is&Os and daily weights.  Place on fluid restriction of 1.5 L. Cardiology has not been consulted. Continue to stress to patient importance of self efficacy and  on diet for CHF. Last BNP reviewed- and noted below No results for input(s): "BNP", "BNPTRIAGEBLO" in the last 168 hours.   Volume removal with HD.     "

## 2024-08-28 NOTE — PT/OT/SLP EVAL
Occupational Therapy   Evaluation    Name: Michoacano Damon  MRN: [id number]  Admitting Diagnosis: Type 2 MI (myocardial infarction)  Recent Surgery: Procedure(s) (LRB):  Angiogram, Coronary, with Left Heart Cath (N/A) 1 Day Post-Op    Recommendations:     Discharge Recommendations: Moderate Intensity Therapy  Discharge Equipment Recommendations:  none  Barriers to discharge:  None    Assessment:     Michoacano Damon is a 74 y.o. male with a medical diagnosis of Type 2 MI (myocardial infarction).  He presents with weakness and decline in ADLs. Performance deficits affecting function: weakness, impaired endurance, impaired self care skills, impaired functional mobility, gait instability, impaired balance.      Rehab Prognosis: Good; patient would benefit from acute skilled OT services to address these deficits and reach maximum level of function.       Plan:     Patient to be seen 5 x/week to address the above listed problems via self-care/home management, therapeutic activities, therapeutic exercises  Plan of Care Expires: 09/25/24  Plan of Care Reviewed with: patient    Subjective     Chief Complaint: Type 2 MI  Patient/Family Comments/goals: pt agreeable to OT eval    Occupational Profile:  Living Environment: pt at [facility]  Previous level of function: receives assist with ADL tasks as needed; utilized cane for functional mobility   Roles and Routines: perform self care  Equipment Used at Home: cane, straight, walker, rolling, shower chair, grab bar  Assistance upon Discharge: swing bed    Pain/Comfort:  Pain Rating 1: 8/10  Location - Side 1: Left  Location 1: gum  Pain Addressed 1: Pre-medicate for activity    Patients cultural, spiritual, Jain conflicts given the current situation: no    Objective:     Communicated with: TEDDY Issa prior to session.  Patient found supine with peripheral IV, telemetry upon OT entry to room.    General Precautions: Standard, fall  Orthopedic Precautions: N/A  Braces:  N/A  Respiratory Status: Nasal cannula, flow 3 L/min    Occupational Performance:    Bed Mobility:    Patient completed Supine to Sit with moderate assistance  Patient completed Sit to Supine with moderate assistance    Functional Mobility/Transfers:  Patient completed Sit <> Stand Transfer with moderate assistance  with  hand-held assist   Functional Mobility: pt performed side step to HOB with ModA with hand held assist    Activities of Daily Living:  Lower Body Dressing: maximal assistance to hernandez socks    Cognitive/Visual Perceptual:  Cognitive/Psychosocial Skills:     -       Oriented to: Person, Place, Time, and Situation   -       Follows Commands/attention:Follows multistep  commands  -       Mood/Affect/Coping skills/emotional control: Cooperative    Physical Exam:  Balance:    -       sitting balance CGA; standing balance Morena  Upper Extremity Range of Motion:     -       Right Upper Extremity: WFL  -       Left Upper Extremity: WFL  Upper Extremity Strength: -       Right Upper Extremity: 4/5  -       Left Upper Extremity: 4/5  Gross motor coordination:   WFL    AMPAC 6 Click ADL:  AMPAC Total Score: 16    Treatment & Education:  Pt educated on OT role/POC.   Importance of OOB activity with staff assistance.  Importance of sitting up in the chair throughout the day as tolerated, especially for meals   Safety during functional t/f and mobility with use of RW  Importance of assisting with self-care activities   All questions/concerns answered within OT scope of practice      Patient left supine with all lines intact, call button in reach, and TEDDY Issa notified    GOALS:   Multidisciplinary Problems       Occupational Therapy Goals          Problem: Occupational Therapy    Goal Priority Disciplines Outcome Interventions   Occupational Therapy Goal     OT, PT/OT Progressing    Description: STG:  Pt will perform grooming with setup  Pt will bathe with Morena with setup at EOB  Pt will perform UE dressing  with Eneida  Pt will perform LE dressing with Morena  Pt will sit EOB x 10 min with SBA  Pt will transfer bed/chair/bsc with Morena with RW  Pt will perform standing task x 2 min with Morena with RW   Pt will tolerate 15 minutes of tx without fatigue      LT.Restore to max I with self care and mobility.                           History:     Past Medical History:   Diagnosis Date    Anticoagulant long-term use     COPD (chronic obstructive pulmonary disease)     Coronary artery disease     ESRD on dialysis     HFrEF (heart failure with reduced ejection fraction)     Hypertension          Past Surgical History:   Procedure Laterality Date    AV FISTULA PLACEMENT, BRACHIOCEPHALIC      INSERTION OF DIALYSIS CATHETER         Time Tracking:     OT Date of Treatment: 24  OT Start Time: 1518  OT Stop Time: 1530  OT Total Time (min): 12 min    Billable Minutes:Evaluation OT min complexity eval    2024

## 2024-08-28 NOTE — ASSESSMENT & PLAN NOTE
Anemia is likely due to chronic disease due to ESRD. Cannot rule out acute blood loss given drop of > 2 points in Hgb over last 24-48 hours.   Most recent hemoglobin and hematocrit are listed below.  Recent Labs     08/27/24  1040 08/27/24  1800 08/28/24  0901   HGB 6.7* 6.4* 10.0*   HCT 21.6* 20.3* 30.7*     Plan  - Monitor serial CBC: Daily  - Transfuse PRBC if patient becomes hemodynamically unstable, symptomatic or H/H drops below 7/21.  - Patient has received 2 units of PRBCs on 8/28  - Patient's anemia is currently improving  - Check stool for occult blood.

## 2024-08-28 NOTE — PROGRESS NOTES
[facility] - [facility] Telemetry  Wound Care    Patient Name:  Michoacano Damon   MRN:  [id number]  Date: 8/28/2024  Diagnosis: NSTEMI (non-ST elevated myocardial infarction)    History:     Past Medical History:   Diagnosis Date    Anticoagulant long-term use     COPD (chronic obstructive pulmonary disease)     Coronary artery disease     ESRD on dialysis     HFrEF (heart failure with reduced ejection fraction) 2021    Hypertension        Social History     Socioeconomic History    Marital status:    Occupational History    Occupation: retired   Tobacco Use    Smoking status: Every Day     Current packs/day: 1.00     Average packs/day: 1 pack/day for 15.0 years (15.0 ttl pk-yrs)     Types: Cigarettes    Smokeless tobacco: Never   Substance and Sexual Activity    Alcohol use: Not Currently    Drug use: Not Currently     Social Determinants of Health     Financial Resource Strain: Low Risk  (8/26/2024)    Overall Financial Resource Strain (CARDIA)     Difficulty of Paying Living Expenses: Not hard at all   Food Insecurity: No Food Insecurity (8/26/2024)    Hunger Vital Sign     Worried About Running Out of Food in the Last Year: Never true     Ran Out of Food in the Last Year: Never true   Transportation Needs: No Transportation Needs (8/26/2024)    TRANSPORTATION NEEDS     Transportation : No   Physical Activity: Inactive (8/26/2024)    Exercise Vital Sign     Days of Exercise per Week: 0 days     Minutes of Exercise per Session: 0 min   Stress: No Stress Concern Present (8/26/2024)    [employer] [place] [facility] - Occupational Stress Questionnaire     Feeling of Stress : Not at all   Housing Stability: Low Risk  (8/26/2024)    Housing Stability Vital Sign     Unable to Pay for Housing in the Last Year: No     Homeless in the Last Year: No       Precautions:     Allergies as of 08/25/2024    (No Known Allergies)       WOC Assessment Details/Treatment     Narrative: Seen patient for  initiation of preventative skin care measures    Patient in bed. Sleepy. Sacral and Bilateral heels with out pressure injury noted. Applied Mepliex Foam border to heels and sacral. Asked Renetta HERNANDEZ to place Overlay to bed.   Jose score 16.    Consult skin care for any skin issues         08/28/2024

## 2024-08-28 NOTE — ASSESSMENT & PLAN NOTE
Presented with volume overload.   Creatine stable for now. BMP reviewed- noted Estimated Creatinine Clearance: 8.1 mL/min (A) (based on SCr of 7.96 mg/dL (H)). according to latest data. Based on current GFR, CKD stage is end stage.  Monitor UOP and serial BMP and adjust therapy as needed. Renally dose meds. Avoid nephrotoxic medications and procedures.  Dialysis Monday, Wednesday, Friday  Nephrology consulted, appreciate assistance.

## 2024-08-28 NOTE — ASSESSMENT & PLAN NOTE
8/27/2024:  - Pt with continued jaw/mandible pain present after recent bronchoscopy affecting his ability to eat  - CT ordered    8/28/2024:  - CT negative for acute findings

## 2024-08-28 NOTE — PROGRESS NOTES
[facility] - [facility] Tele[facility]  Cardiology  Progress Note    Patient Name: Michoacano Damon  MRN: [id number]  Admission Date: 8/25/2024  Hospital Length of Stay: 3 days  Code Status: Full Code   Attending Physician: Joel Sellers MD   Primary Care Physician: Norma, Primary Doctor  Expected Discharge Date: 8/29/2024  Principal Problem:Type 2 MI (myocardial infarction)    Subjective:     Hospital Course:   No notes on file    Interval History: Patient seen today s/p C yesterday with patent LAD stent and distal Lcx stent with 65% in-stent restenosis. Denies chest pain or sob. Right groin stable, no bleeding or hematoma.     Review of Systems   Constitutional: Negative for chills and fever.   HENT:          Mouth pain   Cardiovascular:  Negative for chest pain and orthopnea.   Respiratory:  Negative for shortness of breath.      Objective:     Vital Signs (Most Recent):  Temp: 98 °F (36.7 °C) (08/28/24 0930)  Pulse: 105 (08/28/24 1348)  Resp: 20 (08/28/24 1348)  BP: (!) 118/44 (08/28/24 1230)  SpO2: 100 % (08/28/24 0756) Vital Signs (24h Range):  Temp:  [97.2 °F (36.2 °C)-98 °F (36.7 °C)] 98 °F (36.7 °C)  Pulse:  [] 105  Resp:  [16-21] 20  SpO2:  [97 %-100 %] 100 %  BP: (106-154)/(34-84) 118/44     Weight: 71.7 kg (158 lb 1.6 oz)  Body mass index is 22.05 kg/m².     SpO2: 100 %         Intake/Output Summary (Last 24 hours) at 8/28/2024 1507  Last data filed at 8/28/2024 0223  Gross per 24 hour   Intake 300 ml   Output --   Net 300 ml       Lines/Drains/Airways       Peripheral Intravenous Line  Duration                  Hemodialysis AV Fistula Right forearm -- days         Peripheral IV - Single Lumen 08/25/24 20 G Anterior;Left Forearm 3 days         Peripheral IV - Single Lumen 08/25/24 1655 20 G Other (Comments) No Anterior;Left Upper Arm 2 days                       Physical Exam  Vitals reviewed.   Constitutional:       Appearance: He is ill-appearing.   Eyes:      General: No scleral  "icterus.  Cardiovascular:      Rate and Rhythm: Normal rate and regular rhythm.      Heart sounds: Normal heart sounds.   Pulmonary:      Effort: Pulmonary effort is normal.      Breath sounds: Wheezing present.   Abdominal:      General: Bowel sounds are normal.      Palpations: Abdomen is soft.   Skin:     General: Skin is warm and dry.   Neurological:      Mental Status: He is alert and oriented to person, place, and time.            Significant Labs: ABG: No results for input(s): "PH", "PCO2", "HCO3", "POCSATURATED", "BE" in the last 48 hours., Blood Culture: No results for input(s): "LABBLOO" in the last 48 hours., BMP:   Recent Labs   Lab 08/27/24  1040 08/27/24  1800 08/28/24  0901   *  --  115*     --  141   K 6.0* 5.7* 6.3*     --  103   CO2 27  --  27   BUN 86*  --  113*   CREATININE 7.06*  --  7.96*   CALCIUM 8.5  --  8.7   , CMP   Recent Labs   Lab 08/27/24  1040 08/27/24  1800 08/28/24  0901     --  141   K 6.0* 5.7* 6.3*     --  103   CO2 27  --  27   *  --  115*   BUN 86*  --  113*   CREATININE 7.06*  --  7.96*   CALCIUM 8.5  --  8.7   ANIONGAP 15  --  17*   , CBC   Recent Labs   Lab 08/27/24  1040 08/27/24  1800 08/28/24  0901   WBC 6.87  --  7.72   HGB 6.7* 6.4* 10.0*   HCT 21.6* 20.3* 30.7*   PLT 66*  --  54*   , INR No results for input(s): "INR", "PROTIME" in the last 48 hours., Lipid Panel No results for input(s): "CHOL", "HDL", "LDLCALC", "TRIG", "CHOLHDL" in the last 48 hours., and Troponin No results for input(s): "TROPONINI" in the last 48 hours.    Significant Imaging: Cardiac Cath: Results for orders placed during the hospital encounter of 08/25/24    Cardiac catheterization (Needs Review)  This result has not been signed. Information might be incomplete.    Conclusion    The Dist Cx lesion was 65% stenosed.    The left ventricular end diastolic pressure was decreased.    The pre-procedure left ventricular end diastolic pressure was 2.    The estimated " blood loss was none.    There was single vessel coronary artery disease.    Co-dominant, single vessel CAD - patent stents in the LAD and LCX with 65% in-stent restenosis.  Low left sided filling pressures, LVEDP - 2mmHg    Plan:  Medical management and risk factor modification  Type II MI    The procedure log was documented by Documenter: Tona Long RN and verified by Oziel Santos MD.    Date: 8/27/2024  Time: 4:25 PM      and Echocardiogram: Transthoracic echo (TTE) complete (Cupid Only):   Results for orders placed or performed during the hospital encounter of 08/25/24   Echo   Result Value Ref Range    BSA 1.88 m2    A4C EF 45 %    LVOT stroke volume 40.87 cm3    LVIDd 3.64 3.5 - 6.0 cm    LV Systolic Volume 31.10 mL    LV Systolic Volume Index 16.4 mL/m2    LVIDs 2.86 2.1 - 4.0 cm    LV Diastolic Volume 55.72 mL    LV ESV A4C 41.29 mL    LV Diastolic Volume Index 29.33 mL/m2    LV EDV A4C 33.45 mL    Left Ventricular End Systolic Volume by Teichholz Method 31.10 mL    Left Ventricular End Diastolic Volume by Teichholz Method 55.72 mL    IVS 1.89 (A) 0.6 - 1.1 cm    LVOT diameter 1.72 cm    LVOT area 2.3 cm2    FS 21 (A) 28 - 44 %    Left Ventricle Relative Wall Thickness 1.10 cm    Posterior Wall 2.01 (A) 0.6 - 1.1 cm    LV mass 317.12 g    LV Mass Index 167 g/m2    MV Peak E Anuja 0.53 m/s    TDI LATERAL 0.07 m/s    TDI SEPTAL 0.03 m/s    E/E' ratio 10.60 m/s    MV Peak A Anuja 1.50 m/s    TR Max Anuja 3.00 m/s    E/A ratio 0.35     E wave deceleration time 173.74 msec    LV SEPTAL E/E' RATIO 17.67 m/s    LV LATERAL E/E' RATIO 7.57 m/s    LVOT peak anuja 1.10 m/s    Left Ventricular Outflow Tract Mean Velocity 0.78 cm/s    Left Ventricular Outflow Tract Mean Gradient 2.75 mmHg    RV- herrera basal diam 3.6 cm    RV-herrera mid d 3.2 cm    Right ventricular length in diastole (apical 4-chamber view) 3.88 cm    RV-herrera length 3.9 cm    RV mid diameter 3.22 cm    TAPSE 1.23 cm    LA size 3.43 cm    RA Major Axis  3.88 cm    AV mean gradient 6 mmHg    AV peak gradient 13 mmHg    Ao peak anuja 1.77 m/s    Ao VTI 32.40 cm    LVOT peak VTI 17.60 cm    AV valve area 1.26 cm²    AV Velocity Ratio 0.62     AV index (prosthetic) 0.54     EUFEMIA by Velocity Ratio 1.44 cm²    MV stenosis pressure 1/2 time 50.39 ms    MV valve area p 1/2 method 4.37 cm2    Triscuspid Valve Regurgitation Peak Gradient 36 mmHg    PV PEAK VELOCITY 0.68 m/s    PV peak gradient 2 mmHg    Ao root annulus 3.43 cm    IVC diameter 1.88 cm    Mean e' 0.05 m/s    ZLVIDS -1.10     ZLVIDD -3.82     LA area A4C 15.31 cm2    AORTIC VALVE CUSP SEPERATION 1.70 cm    EF 45 %    TV resting pulmonary artery pressure 39 mmHg    RV TB RVSP 6 mmHg    Est. RA pres 3 mmHg    Narrative      Left Ventricle: The left ventricle is smaller than normal. There is   severe concentric hypertrophy. Regional wall motion abnormalities present.    Inferior wall hypokinesis is noted There is mildly reduced systolic   function with a visually estimated ejection fraction of 40 - 50%. Ejection   fraction by visual approximation is 45%. There is diastolic dysfunction.    Right Ventricle: Normal right ventricular cavity size.    Aortic Valve: The aortic valve is a trileaflet valve. Mildly calcified   cusps.    Tricuspid Valve: There is mild regurgitation. There is mild pulmonary   hypertension.    IVC/SVC: Normal venous pressure at 3 mmHg.    Pericardium: There is a trivial effusion.       Assessment and Plan:     Brief HPI:   75 y/o male with PMH of CAD multivessel, CHF, ESRD on dialysis, COPD who presents to [facility] via EMS, [facility] with c/o Weakness and SOB. Patient states he is more short of breath than usual and this started today.  He states he is unable to walk due to his shortness of breath and weakness.  Patient states he is compliant with his medications and that he does not missed dialysis.  Patient also has a complaint of mouth pain.  He points to the  left lower gumline to a point of tenderness.  Patient has no teeth at all.  Patient was recently seen at [facility] pulmonology 5 days ago for checkup.  Patient denies headache, chest pain, abdominal pain or lower extremity edema.     Cardiology consulted for NSTEMI. Troponin 569, 553. EKG SR with PACs and nonspecific TWA. Repeat echo pending. CXR with mild cardiac decompensation and / or pneumonitis. Most recent LHC was in 2022 s/p PCI of dist RCA and then in 2021 s/p PCI of LAD & Lcx. Last seen by Dr. Santos in 8/2021. Patient seen while in dialysis today. He denies any chest pain. His main complaint today is mouth pain and difficulty swallowing his food.        * Type 2 MI (myocardial infarction)  - Patient seen and evaluated by Dr. Trujillo  - Previously followed by Dr. Santos, last seen 8/2021; LHC 2022 s/p stent RCA and 2020 s/p stents LAD & Lcx  - Troponin 569, 553; flat  - EKG SR with PAC and nonspecific TWA  - Repeat echo pending  - This is likely Type 2 MI in setting of ESRD on dialysis and heart failure, however we will review echo for any new abnormalities and decide if LHC may be warranted  - Further recommendations to follow    8/27/2024:  - Echo with mildly reduced EF 45% and new lateral wall hypokinesis  - Plan for LHC today. Procedure, risk, and benefits discussed in detail with patient. He verbalized understanding and wishes to proceed. Consent obtained and on chart.  - Further recommendations to follow    8/28/2024:  - S/P LHC with patent LAD stent and 65% in-stent restenosis distal Lcx stent  - Type 2 MI secondary to ESRD and acute on chronic heart failure  - Recommend continue medical management  - Statin, coreg, and brilinta (discussed with Dr. Santos, discontinuing ASA due to platelet ct 54 today, continue monitoring closely)  - Cardiology will sign off at this time, please call if any further assistance is needed  - Follow up with cardiology in 2 weeks, appt  scheduled    Thrombocytopenia  8/28/2024:  - Platelet count 54 today, on ASA & Brilinta  - Discussed with Dr. Santos, discontinue ASA 81, continue Brilinta  - Being followed by primary team    Jaw pain  8/27/2024:  - Pt with continued jaw/mandible pain present after recent bronchoscopy affecting his ability to eat  - CT ordered    8/28/2024:  - CT negative for acute findings    Hyperkalemia  - Potassium 7.2 this morning, currently being dialyzed  - Being followed by primary team    8/27/2024:  - Potassium 6.0 today; Lokelma ordered by primary team and dialysis tomorrow    Chronic obstructive pulmonary disease with acute exacerbation  - Followed by pulmonology    ESRD (end stage renal disease)  - On dialysis        VTE Risk Mitigation (From admission, onward)           Ordered     IP VTE HIGH RISK PATIENT  Once         08/25/24 1847     Place sequential compression device  Until discontinued         08/25/24 1847                    ANDRÉS Smith  Cardiology  [facility] - [address] [place] [address]

## 2024-08-28 NOTE — ASSESSMENT & PLAN NOTE
Patient with Hypercapnic and Hypoxic Respiratory failure which is Acute on chronic.  he is on home oxygen at 2 LPM. Supplemental oxygen was provided and noted- Oxygen Concentration (%):  [24-28] 24    .   Signs/symptoms of respiratory failure include- tachypnea and respiratory distress. Contributing diagnoses includes - CHF and COPD Labs and images were reviewed. Patient Has recent ABG, which has been reviewed. Will treat underlying causes and adjust management of respiratory failure as follows-     Continue volume removal with HD, steroids/antibiotics.   Wean O2 as able- O2 requirement is improved, on 1L NC now.

## 2024-08-28 NOTE — SUBJECTIVE & OBJECTIVE
Interval History: Patient seen today s/p [facility] yesterday with patent LAD stent and distal Lcx stent with 65% in-stent restenosis. Denies chest pain or sob. Right groin stable, no bleeding or hematoma.     Review of Systems   Constitutional: Negative for chills and fever.   HENT:          Mouth pain   Cardiovascular:  Negative for chest pain and orthopnea.   Respiratory:  Negative for shortness of breath.      Objective:     Vital Signs (Most Recent):  Temp: 98 °F (36.7 °C) (08/28/24 0930)  Pulse: 105 (08/28/24 1348)  Resp: 20 (08/28/24 1348)  BP: (!) 118/44 (08/28/24 1230)  SpO2: 100 % (08/28/24 0756) Vital Signs (24h Range):  Temp:  [97.2 °F (36.2 °C)-98 °F (36.7 °C)] 98 °F (36.7 °C)  Pulse:  [] 105  Resp:  [16-21] 20  SpO2:  [97 %-100 %] 100 %  BP: (106-154)/(34-84) 118/44     Weight: 71.7 kg (158 lb 1.6 oz)  Body mass index is 22.05 kg/m².     SpO2: 100 %         Intake/Output Summary (Last 24 hours) at 8/28/2024 1507  Last data filed at 8/28/2024 0223  Gross per 24 hour   Intake 300 ml   Output --   Net 300 ml       Lines/Drains/Airways       Peripheral Intravenous Line  Duration                  Hemodialysis AV Fistula Right forearm -- days         Peripheral IV - Single Lumen 08/25/24 20 G Anterior;Left Forearm 3 days         Peripheral IV - Single Lumen 08/25/24 1655 20 G Other (Comments) No Anterior;Left Upper Arm 2 days                       Physical Exam  Vitals reviewed.   Constitutional:       Appearance: He is ill-appearing.   Eyes:      General: No scleral icterus.  Cardiovascular:      Rate and Rhythm: Normal rate and regular rhythm.      Heart sounds: Normal heart sounds.   Pulmonary:      Effort: Pulmonary effort is normal.      Breath sounds: Wheezing present.   Abdominal:      General: Bowel sounds are normal.      Palpations: Abdomen is soft.   Skin:     General: Skin is warm and dry.   Neurological:      Mental Status: He is alert and oriented to person, place, and time.         "    Significant Labs: ABG: No results for input(s): "PH", "PCO2", "HCO3", "POCSATURATED", "BE" in the last 48 hours., Blood Culture: No results for input(s): "LABBLOO" in the last 48 hours., BMP:   Recent Labs   Lab 08/27/24  1040 08/27/24  1800 08/28/24  0901   *  --  115*     --  141   K 6.0* 5.7* 6.3*     --  103   CO2 27  --  27   BUN 86*  --  113*   CREATININE 7.06*  --  7.96*   CALCIUM 8.5  --  8.7   , CMP   Recent Labs   Lab 08/27/24  1040 08/27/24  1800 08/28/24  0901     --  141   K 6.0* 5.7* 6.3*     --  103   CO2 27  --  27   *  --  115*   BUN 86*  --  113*   CREATININE 7.06*  --  7.96*   CALCIUM 8.5  --  8.7   ANIONGAP 15  --  17*   , CBC   Recent Labs   Lab 08/27/24  1040 08/27/24  1800 08/28/24  0901   WBC 6.87  --  7.72   HGB 6.7* 6.4* 10.0*   HCT 21.6* 20.3* 30.7*   PLT 66*  --  54*   , INR No results for input(s): "INR", "PROTIME" in the last 48 hours., Lipid Panel No results for input(s): "CHOL", "HDL", "LDLCALC", "TRIG", "CHOLHDL" in the last 48 hours., and Troponin No results for input(s): "TROPONINI" in the last 48 hours.    Significant Imaging: Cardiac Cath: Results for orders placed during the hospital encounter of 08/25/24    Cardiac catheterization (Needs Review)  This result has not been signed. Information might be incomplete.    Conclusion    The Dist Cx lesion was 65% stenosed.    The left ventricular end diastolic pressure was decreased.    The pre-procedure left ventricular end diastolic pressure was 2.    The estimated blood loss was none.    There was single vessel coronary artery disease.    Co-dominant, single vessel CAD - patent stents in the LAD and LCX with 65% in-stent restenosis.  Low left sided filling pressures, LVEDP - 2mmHg    Plan:  Medical management and risk factor modification  Type II MI    The procedure log was documented by Documenter: Tona Long RN and verified by Oziel Santos MD.    Date: 8/27/2024  Time: 4:25 PM "      and Echocardiogram: Transthoracic echo (TTE) complete (Cupid Only):   Results for orders placed or performed during the hospital encounter of 08/25/24   Echo   Result Value Ref Range    BSA 1.88 m2    A4C EF 45 %    LVOT stroke volume 40.87 cm3    LVIDd 3.64 3.5 - 6.0 cm    LV Systolic Volume 31.10 mL    LV Systolic Volume Index 16.4 mL/m2    LVIDs 2.86 2.1 - 4.0 cm    LV Diastolic Volume 55.72 mL    LV ESV A4C 41.29 mL    LV Diastolic Volume Index 29.33 mL/m2    LV EDV A4C 33.45 mL    Left Ventricular End Systolic Volume by Teichholz Method 31.10 mL    Left Ventricular End Diastolic Volume by Teichholz Method 55.72 mL    IVS 1.89 (A) 0.6 - 1.1 cm    LVOT diameter 1.72 cm    LVOT area 2.3 cm2    FS 21 (A) 28 - 44 %    Left Ventricle Relative Wall Thickness 1.10 cm    Posterior Wall 2.01 (A) 0.6 - 1.1 cm    LV mass 317.12 g    LV Mass Index 167 g/m2    MV Peak E Abraham 0.53 m/s    TDI LATERAL 0.07 m/s    TDI SEPTAL 0.03 m/s    E/E' ratio 10.60 m/s    MV Peak A Abraham 1.50 m/s    TR Max Abraham 3.00 m/s    E/A ratio 0.35     E wave deceleration time 173.74 msec    LV SEPTAL E/E' RATIO 17.67 m/s    LV LATERAL E/E' RATIO 7.57 m/s    LVOT peak abraham 1.10 m/s    Left Ventricular Outflow Tract Mean Velocity 0.78 cm/s    Left Ventricular Outflow Tract Mean Gradient 2.75 mmHg    RV- herrera basal diam 3.6 cm    RV-herrera mid d 3.2 cm    Right ventricular length in diastole (apical 4-chamber view) 3.88 cm    RV-herrera length 3.9 cm    RV mid diameter 3.22 cm    TAPSE 1.23 cm    LA size 3.43 cm    RA Major Axis 3.88 cm    AV mean gradient 6 mmHg    AV peak gradient 13 mmHg    Ao peak abraham 1.77 m/s    Ao VTI 32.40 cm    LVOT peak VTI 17.60 cm    AV valve area 1.26 cm²    AV Velocity Ratio 0.62     AV index (prosthetic) 0.54     EUFEMIA by Velocity Ratio 1.44 cm²    MV stenosis pressure 1/2 time 50.39 ms    MV valve area p 1/2 method 4.37 cm2    Triscuspid Valve Regurgitation Peak Gradient 36 mmHg    PV PEAK VELOCITY 0.68 m/s    PV peak gradient 2  mmHg    Ao root annulus 3.43 cm    IVC diameter 1.88 cm    Mean e' 0.05 m/s    ZLVIDS -1.10     ZLVIDD -3.82     LA area A4C 15.31 cm2    AORTIC VALVE CUSP SEPERATION 1.70 cm    EF 45 %    TV resting pulmonary artery pressure 39 mmHg    RV TB RVSP 6 mmHg    Est. RA pres 3 mmHg    Narrative      Left Ventricle: The left ventricle is smaller than normal. There is   severe concentric hypertrophy. Regional wall motion abnormalities present.    Inferior wall hypokinesis is noted There is mildly reduced systolic   function with a visually estimated ejection fraction of 40 - 50%. Ejection   fraction by visual approximation is 45%. There is diastolic dysfunction.    Right Ventricle: Normal right ventricular cavity size.    Aortic Valve: The aortic valve is a trileaflet valve. Mildly calcified   cusps.    Tricuspid Valve: There is mild regurgitation. There is mild pulmonary   hypertension.    IVC/SVC: Normal venous pressure at 3 mmHg.    Pericardium: There is a trivial effusion.

## 2024-08-28 NOTE — ASSESSMENT & PLAN NOTE
Chronic, uncontrolled. Latest blood pressure and vitals reviewed-     Temp:  [97.2 °F (36.2 °C)-98 °F (36.7 °C)]   Pulse:  []   Resp:  [16-22]   BP: (106-154)/(56-84)   SpO2:  [97 %-100 %] .   Home meds for hypertension were reviewed and noted below.   Hypertension Medications               carvediloL (COREG) 12.5 MG tablet Take 12.5 mg by mouth 2 (two) times daily.    hydrALAZINE (APRESOLINE) 100 MG tablet Take 1 tablet (100 mg total) by mouth 3 (three) times daily.            While in the hospital, will manage blood pressure as follows; Continue home antihypertensive regimen    Will utilize p.r.n. blood pressure medication only if patient's blood pressure greater than 180/110 and he develops symptoms such as worsening chest pain or shortness of breath.

## 2024-08-29 VITALS
DIASTOLIC BLOOD PRESSURE: 69 MMHG | BODY MASS INDEX: 22.13 KG/M2 | TEMPERATURE: 97 F | SYSTOLIC BLOOD PRESSURE: 112 MMHG | OXYGEN SATURATION: 95 % | HEIGHT: 71 IN | WEIGHT: 158.06 LBS | RESPIRATION RATE: 18 BRPM | HEART RATE: 85 BPM

## 2024-08-29 LAB
ANION GAP SERPL CALCULATED.3IONS-SCNC: 10 MMOL/L (ref 7–16)
ANISOCYTOSIS BLD QL SMEAR: ABNORMAL
BASOPHILS # BLD AUTO: 0 K/UL (ref 0–0.2)
BASOPHILS NFR BLD AUTO: 0 % (ref 0–1)
BUN SERPL-MCNC: 69 MG/DL (ref 7–18)
BUN/CREAT SERPL: 11 (ref 6–20)
CALCIUM SERPL-MCNC: 8.2 MG/DL (ref 8.5–10.1)
CHLORIDE SERPL-SCNC: 106 MMOL/L (ref 98–107)
CO2 SERPL-SCNC: 26 MMOL/L (ref 21–32)
CREAT SERPL-MCNC: 6.1 MG/DL (ref 0.7–1.3)
CRENATED CELLS: ABNORMAL
DIFFERENTIAL METHOD BLD: ABNORMAL
EGFR (NO RACE VARIABLE) (RUSH/TITUS): 9 ML/MIN/1.73M2
EOSINOPHIL # BLD AUTO: 0 K/UL (ref 0–0.5)
EOSINOPHIL NFR BLD AUTO: 0 % (ref 1–4)
ERYTHROCYTE [DISTWIDTH] IN BLOOD BY AUTOMATED COUNT: 17.9 % (ref 11.5–14.5)
GLUCOSE SERPL-MCNC: 82 MG/DL (ref 74–106)
HCT VFR BLD AUTO: 28.5 % (ref 40–54)
HGB BLD-MCNC: 9.2 G/DL (ref 13.5–18)
IMM GRANULOCYTES # BLD AUTO: 0.14 K/UL (ref 0–0.04)
IMM GRANULOCYTES NFR BLD: 2.5 % (ref 0–0.4)
LYMPHOCYTES # BLD AUTO: 0.16 K/UL (ref 1–4.8)
LYMPHOCYTES NFR BLD AUTO: 2.9 % (ref 27–41)
MCH RBC QN AUTO: 29.9 PG (ref 27–31)
MCHC RBC AUTO-ENTMCNC: 32.3 G/DL (ref 32–36)
MCV RBC AUTO: 92.5 FL (ref 80–96)
MONOCYTES # BLD AUTO: 0.4 K/UL (ref 0–0.8)
MONOCYTES NFR BLD AUTO: 7.2 % (ref 2–6)
MPC BLD CALC-MCNC: 11.6 FL (ref 9.4–12.4)
NEUTROPHILS # BLD AUTO: 4.85 K/UL (ref 1.8–7.7)
NEUTROPHILS NFR BLD AUTO: 87.4 % (ref 53–65)
NRBC # BLD AUTO: 0.03 X10E3/UL
NRBC, AUTO (.00): 0.5 %
OVALOCYTES BLD QL SMEAR: ABNORMAL
PLATELET # BLD AUTO: 46 K/UL (ref 150–400)
PLATELET MORPHOLOGY: ABNORMAL
POLYCHROMASIA BLD QL SMEAR: ABNORMAL
POTASSIUM SERPL-SCNC: 4.8 MMOL/L (ref 3.5–5.1)
RBC # BLD AUTO: 3.08 M/UL (ref 4.6–6.2)
SODIUM SERPL-SCNC: 137 MMOL/L (ref 136–145)
WBC # BLD AUTO: 5.55 K/UL (ref 4.5–11)

## 2024-08-29 PROCEDURE — 27000221 HC OXYGEN, UP TO 24 HOURS

## 2024-08-29 PROCEDURE — 94640 AIRWAY INHALATION TREATMENT: CPT

## 2024-08-29 PROCEDURE — 80048 BASIC METABOLIC PNL TOTAL CA: CPT

## 2024-08-29 PROCEDURE — 25000003 PHARM REV CODE 250

## 2024-08-29 PROCEDURE — 25000003 PHARM REV CODE 250: Performed by: INTERNAL MEDICINE

## 2024-08-29 PROCEDURE — 94761 N-INVAS EAR/PLS OXIMETRY MLT: CPT

## 2024-08-29 PROCEDURE — 25000242 PHARM REV CODE 250 ALT 637 W/ HCPCS: Performed by: INTERNAL MEDICINE

## 2024-08-29 PROCEDURE — 97530 THERAPEUTIC ACTIVITIES: CPT

## 2024-08-29 PROCEDURE — 85025 COMPLETE CBC W/AUTO DIFF WBC: CPT | Performed by: INTERNAL MEDICINE

## 2024-08-29 PROCEDURE — 63600175 PHARM REV CODE 636 W HCPCS: Performed by: INTERNAL MEDICINE

## 2024-08-29 PROCEDURE — 36415 COLL VENOUS BLD VENIPUNCTURE: CPT | Performed by: INTERNAL MEDICINE

## 2024-08-29 PROCEDURE — 99900035 HC TECH TIME PER 15 MIN (STAT)

## 2024-08-29 PROCEDURE — 99239 HOSP IP/OBS DSCHRG MGMT >30: CPT | Mod: ,,, | Performed by: INTERNAL MEDICINE

## 2024-08-29 RX ORDER — PREDNISONE 10 MG/1
TABLET ORAL
Start: 2024-08-30 | End: 2024-09-11

## 2024-08-29 RX ORDER — ATORVASTATIN CALCIUM 80 MG/1
80 TABLET, FILM COATED ORAL DAILY
Start: 2024-08-30 | End: 2025-08-30

## 2024-08-29 RX ORDER — NYSTATIN 100000 [USP'U]/ML
500000 SUSPENSION ORAL
Start: 2024-08-29 | End: 2024-09-08

## 2024-08-29 RX ADMIN — CARVEDILOL 12.5 MG: 12.5 TABLET, FILM COATED ORAL at 08:08

## 2024-08-29 RX ADMIN — LEVOFLOXACIN 500 MG: 500 INJECTION, SOLUTION INTRAVENOUS at 08:08

## 2024-08-29 RX ADMIN — SEVELAMER CARBONATE 800 MG: 800 TABLET, FILM COATED ORAL at 08:08

## 2024-08-29 RX ADMIN — IPRATROPIUM BROMIDE AND ALBUTEROL SULFATE 3 ML: 2.5; .5 SOLUTION RESPIRATORY (INHALATION) at 07:08

## 2024-08-29 RX ADMIN — PREDNISONE 40 MG: 20 TABLET ORAL at 08:08

## 2024-08-29 RX ADMIN — BUDESONIDE 0.5 MG: 0.5 INHALANT RESPIRATORY (INHALATION) at 07:08

## 2024-08-29 RX ADMIN — TICAGRELOR 90 MG: 90 TABLET ORAL at 08:08

## 2024-08-29 RX ADMIN — IPRATROPIUM BROMIDE AND ALBUTEROL SULFATE 3 ML: 2.5; .5 SOLUTION RESPIRATORY (INHALATION) at 12:08

## 2024-08-29 RX ADMIN — MUPIROCIN: 20 OINTMENT TOPICAL at 08:08

## 2024-08-29 RX ADMIN — HYDRALAZINE HYDROCHLORIDE 50 MG: 50 TABLET ORAL at 08:08

## 2024-08-29 RX ADMIN — NYSTATIN 500000 UNITS: 100000 SUSPENSION ORAL at 08:08

## 2024-08-29 RX ADMIN — ATORVASTATIN CALCIUM 80 MG: 80 TABLET, FILM COATED ORAL at 08:08

## 2024-08-29 NOTE — PT/OT/SLP PROGRESS
Occupational Therapy   Treatment    Name: Michoacano Damon  MRN: [id number]  Admitting Diagnosis:  Type 2 MI (myocardial infarction)  2 Days Post-Op    Recommendations:     Discharge Recommendations: Moderate Intensity Therapy  Discharge Equipment Recommendations:  none  Barriers to discharge:  None    Assessment:     Michoacano Damon is a 74 y.o. male with a medical diagnosis of Type 2 MI (myocardial infarction).  He presents with weakness and decline in ADLs. Performance deficits affecting function are weakness, impaired endurance, impaired self care skills, impaired functional mobility, gait instability, impaired balance.     Rehab Prognosis:  Good; patient would benefit from acute skilled OT services to address these deficits and reach maximum level of function.       Plan:     Patient to be seen 5 x/week to address the above listed problems via self-care/home management, therapeutic activities, therapeutic exercises  Plan of Care Expires: 09/25/24  Plan of Care Reviewed with: patient    Subjective     Chief Complaint: type 2 MI  Patient/Family Comments/goals: pt agreeable to OT tx  Pain/Comfort:  Pain Rating 1: 0/10    Objective:     Communicated with: TEDDY Issa prior to session.  Patient found supine with oxygen, peripheral IV, telemetry upon OT entry to room.    General Precautions: Standard, fall    Orthopedic Precautions:N/A  Braces: N/A  Respiratory Status: Nasal cannula, flow 1 L/min     Occupational Performance:     Bed Mobility:    Patient completed Supine to Sit with minimum assistance  Patient completed Sit to Supine with minimum assistance     Functional Mobility/Transfers:  Not performed    Activities of Daily Living:  Not performed      [facility] 6 Click ADL:      Treatment & Education:  Pt performed EOB sitting x 10 minutes with CGA with UE support to maintain sitting balance. Pt with complaints of right sided weakness with perform EOB sitting.    Patient left supine with all lines intact and call button in  reach    GOALS:   Multidisciplinary Problems       Occupational Therapy Goals          Problem: Occupational Therapy    Goal Priority Disciplines Outcome Interventions   Occupational Therapy Goal     OT, PT/OT Progressing    Description: STG:  Pt will perform grooming with setup  Pt will bathe with Morena with setup at EOB  Pt will perform UE dressing with Eneida  Pt will perform LE dressing with Morena  Pt will sit EOB x 10 min with SBA  Pt will transfer bed/chair/bsc with Morena with RW  Pt will perform standing task x 2 min with Morena with RW   Pt will tolerate 15 minutes of tx without fatigue      LT.Restore to max I with self care and mobility.                           Time Tracking:     OT Date of Treatment: 24  OT Start Time: 1007  OT Stop Time: 1022  OT Total Time (min): 15 min    Billable Minutes:Therapeutic Activity 15 minutes    OT/CHRISTY: OT          2024

## 2024-08-29 NOTE — SUBJECTIVE & OBJECTIVE
Interval History:  Seen during dialysis today.  He complains of SOB.  No chest pain    Review of patient's allergies indicates:  No Known Allergies  Current Facility-Administered Medications   Medication Frequency    0.9%  NaCl infusion (for blood administration) Q24H PRN    0.9%  NaCl infusion PRN    0.9%  NaCl infusion Continuous PRN    acetaminophen tablet 650 mg Q4H PRN    albuterol-ipratropium 2.5 mg-0.5 mg/3 mL nebulizer solution 3 mL Q6H    albuterol-ipratropium 2.5 mg-0.5 mg/3 mL nebulizer solution 3 mL Q4H PRN    atorvastatin tablet 80 mg Daily    budesonide nebulizer solution 0.5 mg BID    carvediloL tablet 12.5 mg BID    dextrose 10% bolus 125 mL 125 mL PRN    dextrose 10% bolus 250 mL 250 mL PRN    glucagon (human recombinant) injection 1 mg PRN    glucose chewable tablet 16 g PRN    glucose chewable tablet 24 g PRN    hydrALAZINE tablet 50 mg TID    HYDROcodone-acetaminophen  mg per tablet 1 tablet Q8H PRN    levoFLOXacin 500 mg/100 mL IVPB 500 mg Q48H    magic mouthwash (diphenhydrAMINE 12.5 mg/5 mL 20 mL, aluminum & magnesium hydroxide-simethicone (MYLANTA) 20 mL, LIDOcaine HCl 2% (XYLOCAINE) 20 mL) solution Q4H PRN    melatonin tablet 6 mg Nightly PRN    montelukast tablet 10 mg QHS    mupirocin 2 % ointment BID    naloxone 0.4 mg/mL injection 0.02 mg PRN    nystatin 100,000 unit/mL suspension 500,000 Units QID (WM & HS)    ondansetron disintegrating tablet 8 mg Q8H PRN    predniSONE tablet 40 mg Daily    sevelamer carbonate tablet 800 mg TID WM    sodium chloride 0.9% flush 10 mL Q12H PRN    ticagrelor tablet 90 mg BID       Objective:     Vital Signs (Most Recent):  Temp: 97.9 °F (36.6 °C) (08/28/24 2001)  Pulse: 92 (08/28/24 2001)  Resp: 18 (08/28/24 2035)  BP: 122/77 (08/28/24 2035)  SpO2: 98 % (08/28/24 2001) Vital Signs (24h Range):  Temp:  [97.3 °F (36.3 °C)-98.4 °F (36.9 °C)] 97.9 °F (36.6 °C)  Pulse:  [] 92  Resp:  [16-21] 18  SpO2:  [95 %-100 %] 98 %  BP: (102-154)/(34-84)  122/77     Weight: 71.7 kg (158 lb 1.6 oz) (08/28/24 0600)  Body mass index is 22.05 kg/m².  Body surface area is 1.9 meters squared.    I/O last 3 completed shifts:  In: 300 [Blood:300]  Out: -      Physical Exam  Constitutional:       General: He is not in acute distress.  HENT:      Head: Normocephalic and atraumatic.   Eyes:      Pupils: Pupils are equal, round, and reactive to light.   Cardiovascular:      Rate and Rhythm: Normal rate and regular rhythm.      Heart sounds: No murmur heard.     No friction rub. No gallop.   Pulmonary:      Breath sounds: No wheezing or rales.   Abdominal:      General: Bowel sounds are normal.      Tenderness: There is no abdominal tenderness. There is no guarding.   Musculoskeletal:      Right lower leg: No edema.      Left lower leg: No edema.   Neurological:      Mental Status: He is alert and oriented to person, place, and time.   Psychiatric:         Behavior: Behavior normal.          Significant Labs:  BMP:   Recent Labs   Lab 08/25/24  1513 08/26/24  0447 08/28/24  0901 08/28/24  1940   GLU 99   < > 115*  --       < > 141  --    K 5.9*   < > 6.3* 5.0      < > 103  --    CO2 25   < > 27  --    BUN 58*   < > 113*  --    CREATININE 7.96*   < > 7.96*  --    CALCIUM 8.6   < > 8.7  --    MG 2.8*  --   --   --     < > = values in this interval not displayed.     CBC:   Recent Labs   Lab 08/28/24  0901 08/28/24  2045   WBC 7.72  --    RBC 3.33*  --    HGB 10.0* 9.1*   HCT 30.7* 29.0*   PLT 54*  --    MCV 92.2  --    MCH 30.0  --    MCHC 32.6  --         Significant Imaging:

## 2024-08-29 NOTE — SUBJECTIVE & OBJECTIVE
Interval History:  He feels better today with less SOB.  Afebrile    Review of patient's allergies indicates:  No Known Allergies  Current Facility-Administered Medications   Medication Frequency    0.9%  NaCl infusion (for blood administration) Q24H PRN    0.9%  NaCl infusion PRN    0.9%  NaCl infusion Continuous PRN    acetaminophen tablet 650 mg Q4H PRN    albuterol-ipratropium 2.5 mg-0.5 mg/3 mL nebulizer solution 3 mL Q6H    albuterol-ipratropium 2.5 mg-0.5 mg/3 mL nebulizer solution 3 mL Q4H PRN    atorvastatin tablet 80 mg Daily    budesonide nebulizer solution 0.5 mg BID    carvediloL tablet 12.5 mg BID    dextrose 10% bolus 125 mL 125 mL PRN    dextrose 10% bolus 250 mL 250 mL PRN    glucagon (human recombinant) injection 1 mg PRN    glucose chewable tablet 16 g PRN    glucose chewable tablet 24 g PRN    hydrALAZINE tablet 50 mg TID    HYDROcodone-acetaminophen  mg per tablet 1 tablet Q8H PRN    levoFLOXacin 500 mg/100 mL IVPB 500 mg Q48H    magic mouthwash (diphenhydrAMINE 12.5 mg/5 mL 20 mL, aluminum & magnesium hydroxide-simethicone (MYLANTA) 20 mL, LIDOcaine HCl 2% (XYLOCAINE) 20 mL) solution Q4H PRN    melatonin tablet 6 mg Nightly PRN    montelukast tablet 10 mg QHS    mupirocin 2 % ointment BID    naloxone 0.4 mg/mL injection 0.02 mg PRN    nystatin 100,000 unit/mL suspension 500,000 Units QID (WM & HS)    ondansetron disintegrating tablet 8 mg Q8H PRN    predniSONE tablet 40 mg Daily    sevelamer carbonate tablet 800 mg TID WM    sodium chloride 0.9% flush 10 mL Q12H PRN    ticagrelor tablet 90 mg BID       Objective:     Vital Signs (Most Recent):  Temp: 98.5 °F (36.9 °C) (08/29/24 0814)  Pulse: 91 (08/29/24 0814)  Resp: 18 (08/29/24 0814)  BP: 122/67 (08/29/24 0814)  SpO2: 98 % (08/29/24 0727) Vital Signs (24h Range):  Temp:  [97.8 °F (36.6 °C)-98.5 °F (36.9 °C)] 98.5 °F (36.9 °C)  Pulse:  [] 91  Resp:  [16-20] 18  SpO2:  [95 %-99 %] 98 %  BP: (102-161)/(44-85) 122/67     Weight: 71.7  kg (158 lb 1.1 oz) (08/29/24 0449)  Body mass index is 22.05 kg/m².  Body surface area is 1.9 meters squared.    I/O last 3 completed shifts:  In: 300 [Blood:300]  Out: -      Physical Exam  Constitutional:       General: He is not in acute distress.  HENT:      Head: Normocephalic and atraumatic.   Eyes:      Pupils: Pupils are equal, round, and reactive to light.   Cardiovascular:      Rate and Rhythm: Normal rate and regular rhythm.      Heart sounds: No murmur heard.     No friction rub. No gallop.   Pulmonary:      Breath sounds: No wheezing or rales.   Abdominal:      General: Bowel sounds are normal.      Tenderness: There is no abdominal tenderness. There is no guarding.   Musculoskeletal:      Right lower leg: No edema.      Left lower leg: No edema.   Neurological:      Mental Status: He is alert and oriented to person, place, and time.   Psychiatric:         Behavior: Behavior normal.          Significant Labs:  BMP:   Recent Labs   Lab 08/25/24  1513 08/26/24  0447 08/29/24  0715   GLU 99   < > 82      < > 137   K 5.9*   < > 4.8      < > 106   CO2 25   < > 26   BUN 58*   < > 69*   CREATININE 7.96*   < > 6.10*   CALCIUM 8.6   < > 8.2*   MG 2.8*  --   --     < > = values in this interval not displayed.     CBC:   Recent Labs   Lab 08/29/24  0715   WBC 5.55   RBC 3.08*   HGB 9.2*   HCT 28.5*   PLT 46*   MCV 92.5   MCH 29.9   MCHC 32.3        Significant Imaging:

## 2024-08-29 NOTE — PROGRESS NOTES
[facility] - [facility] Telemetry  Nephrology  Progress Note    Patient Name: Michoacano Damon  MRN: [id number]  Admission Date: 8/25/2024  Hospital Length of Stay: 3 days  Attending Provider: Joel Sellers MD   Primary Care Physician: Norma, Primary Doctor  Principal Problem:Type 2 MI (myocardial infarction)    Subjective:     HPI: 74-year-old man with ESRD secondary to hypertension.  He has severe COPD and requires home oxygen.  He presented with shortness of breath.  Chest x-ray shows mild volume overload.  He is also hyperkalemic.    Interval History:  Seen during dialysis today.  He complains of SOB.  No chest pain    Review of patient's allergies indicates:  No Known Allergies  Current Facility-Administered Medications   Medication Frequency    0.9%  NaCl infusion (for blood administration) Q24H PRN    0.9%  NaCl infusion PRN    0.9%  NaCl infusion Continuous PRN    acetaminophen tablet 650 mg Q4H PRN    albuterol-ipratropium 2.5 mg-0.5 mg/3 mL nebulizer solution 3 mL Q6H    albuterol-ipratropium 2.5 mg-0.5 mg/3 mL nebulizer solution 3 mL Q4H PRN    atorvastatin tablet 80 mg Daily    budesonide nebulizer solution 0.5 mg BID    carvediloL tablet 12.5 mg BID    dextrose 10% bolus 125 mL 125 mL PRN    dextrose 10% bolus 250 mL 250 mL PRN    glucagon (human recombinant) injection 1 mg PRN    glucose chewable tablet 16 g PRN    glucose chewable tablet 24 g PRN    hydrALAZINE tablet 50 mg TID    HYDROcodone-acetaminophen  mg per tablet 1 tablet Q8H PRN    levoFLOXacin 500 mg/100 mL IVPB 500 mg Q48H    magic mouthwash (diphenhydrAMINE 12.5 mg/5 mL 20 mL, aluminum & magnesium hydroxide-simethicone (MYLANTA) 20 mL, LIDOcaine HCl 2% (XYLOCAINE) 20 mL) solution Q4H PRN    melatonin tablet 6 mg Nightly PRN    montelukast tablet 10 mg QHS    mupirocin 2 % ointment BID    naloxone 0.4 mg/mL injection 0.02 mg PRN    nystatin 100,000 unit/mL suspension 500,000 Units QID (WM & HS)    ondansetron disintegrating  tablet 8 mg Q8H PRN    predniSONE tablet 40 mg Daily    sevelamer carbonate tablet 800 mg TID WM    sodium chloride 0.9% flush 10 mL Q12H PRN    ticagrelor tablet 90 mg BID       Objective:     Vital Signs (Most Recent):  Temp: 97.9 °F (36.6 °C) (08/28/24 2001)  Pulse: 92 (08/28/24 2001)  Resp: 18 (08/28/24 2035)  BP: 122/77 (08/28/24 2035)  SpO2: 98 % (08/28/24 2001) Vital Signs (24h Range):  Temp:  [97.3 °F (36.3 °C)-98.4 °F (36.9 °C)] 97.9 °F (36.6 °C)  Pulse:  [] 92  Resp:  [16-21] 18  SpO2:  [95 %-100 %] 98 %  BP: (102-154)/(34-84) 122/77     Weight: 71.7 kg (158 lb 1.6 oz) (08/28/24 0600)  Body mass index is 22.05 kg/m².  Body surface area is 1.9 meters squared.    I/O last 3 completed shifts:  In: 300 [Blood:300]  Out: -      Physical Exam  Constitutional:       General: He is not in acute distress.  HENT:      Head: Normocephalic and atraumatic.   Eyes:      Pupils: Pupils are equal, round, and reactive to light.   Cardiovascular:      Rate and Rhythm: Normal rate and regular rhythm.      Heart sounds: No murmur heard.     No friction rub. No gallop.   Pulmonary:      Breath sounds: No wheezing or rales.   Abdominal:      General: Bowel sounds are normal.      Tenderness: There is no abdominal tenderness. There is no guarding.   Musculoskeletal:      Right lower leg: No edema.      Left lower leg: No edema.   Neurological:      Mental Status: He is alert and oriented to person, place, and time.   Psychiatric:         Behavior: Behavior normal.          Significant Labs:  BMP:   Recent Labs   Lab 08/25/24  1513 08/26/24  0447 08/28/24  0901 08/28/24  1940   GLU 99   < > 115*  --       < > 141  --    K 5.9*   < > 6.3* 5.0      < > 103  --    CO2 25   < > 27  --    BUN 58*   < > 113*  --    CREATININE 7.96*   < > 7.96*  --    CALCIUM 8.6   < > 8.7  --    MG 2.8*  --   --   --     < > = values in this interval not displayed.     CBC:   Recent Labs   Lab 08/28/24  0901 08/28/24 2045   WBC 7.72   --    RBC 3.33*  --    HGB 10.0* 9.1*   HCT 30.7* 29.0*   PLT 54*  --    MCV 92.2  --    MCH 30.0  --    MCHC 32.6  --         Significant Imaging:    Assessment/Plan:     Pulmonary  Chronic obstructive pulmonary disease with acute exacerbation  He has severe COPD.  He has required multiple admissions for exacerbations recently.  He requires home O2.  He is wheezing today    Cardiac/Vascular  CAD (coronary artery disease)  Heart catheterization 08/27/2024.  Results noted    Hypertension  Controlled    Renal/  Hyperkalemia  Hyperkalemia has recurred, likely due to transfusion last p.m..    ESRD (end stage renal disease)  Blood pressure stable during dialysis today        Thank you for your consult.     Smooth Herrera MD  Nephrology  [facility] - [address] [place] [address]

## 2024-08-29 NOTE — ASSESSMENT & PLAN NOTE
Anemia is likely due to chronic disease due to ESRD. Cannot rule out acute blood loss given drop of > 2 points in Hgb over last 24-48 hours.   Most recent hemoglobin and hematocrit are listed below.  Recent Labs     08/28/24  0901 08/28/24 2045 08/29/24  0715   HGB 10.0* 9.1* 9.2*   HCT 30.7* 29.0* 28.5*       Plan  - Monitor serial CBC: Daily  - Transfuse PRBC if patient becomes hemodynamically unstable, symptomatic or H/H drops below 7/21.  - Patient has received 2 units of PRBCs on 8/28  - Patient's anemia is currently improving  - Can workup further outpatient.

## 2024-08-29 NOTE — ASSESSMENT & PLAN NOTE
Hyperkalemia is likely due to ESRD.The patients most recent potassium results are listed below.  Recent Labs     08/28/24  0901 08/28/24  1940 08/29/24  0715   K 6.3* 5.0 4.8       Plan  - Monitor for arrhythmias with EKG and/or continuous telemetry.   - Treat the hyperkalemia with  Dialysis .   - Monitor potassium: Daily  - The patient's hyperkalemia is resolved.

## 2024-08-29 NOTE — ASSESSMENT & PLAN NOTE
Patient with Hypercapnic and Hypoxic Respiratory failure which is Acute on chronic.  he is on home oxygen at 2 LPM. Supplemental oxygen was provided and noted- Oxygen Concentration (%):  [24] 24    .   Signs/symptoms of respiratory failure include- tachypnea and respiratory distress. Contributing diagnoses includes - CHF and COPD Labs and images were reviewed. Patient Has recent ABG, which has been reviewed. Will treat underlying causes and adjust management of respiratory failure as follows-     Continue volume removal with HD, steroids/antibiotics.   Wean O2 as able- O2 requirement is improved, on 1L NC now.

## 2024-08-29 NOTE — PROGRESS NOTES
[facility] Tele[facility]  Nephrology  Progress Note    Patient Name: Michoacano Damon  MRN: [id number]  Admission Date: 8/25/2024  Hospital Length of Stay: 4 days  Attending Provider: Joel Sellers MD   Primary Care Physician: Norma, Primary Doctor  Principal Problem:Type 2 MI (myocardial infarction)    Subjective:     HPI: 74-year-old man with ESRD secondary to hypertension.  He has severe COPD and requires home oxygen.  He presented with shortness of breath.  Chest x-ray shows mild volume overload.  He is also hyperkalemic.    Interval History:  He feels better today with less SOB.  Afebrile    Review of patient's allergies indicates:  No Known Allergies  Current Facility-Administered Medications   Medication Frequency    0.9%  NaCl infusion (for blood administration) Q24H PRN    0.9%  NaCl infusion PRN    0.9%  NaCl infusion Continuous PRN    acetaminophen tablet 650 mg Q4H PRN    albuterol-ipratropium 2.5 mg-0.5 mg/3 mL nebulizer solution 3 mL Q6H    albuterol-ipratropium 2.5 mg-0.5 mg/3 mL nebulizer solution 3 mL Q4H PRN    atorvastatin tablet 80 mg Daily    budesonide nebulizer solution 0.5 mg BID    carvediloL tablet 12.5 mg BID    dextrose 10% bolus 125 mL 125 mL PRN    dextrose 10% bolus 250 mL 250 mL PRN    glucagon (human recombinant) injection 1 mg PRN    glucose chewable tablet 16 g PRN    glucose chewable tablet 24 g PRN    hydrALAZINE tablet 50 mg TID    HYDROcodone-acetaminophen  mg per tablet 1 tablet Q8H PRN    levoFLOXacin 500 mg/100 mL IVPB 500 mg Q48H    magic mouthwash (diphenhydrAMINE 12.5 mg/5 mL 20 mL, aluminum & magnesium hydroxide-simethicone (MYLANTA) 20 mL, LIDOcaine HCl 2% (XYLOCAINE) 20 mL) solution Q4H PRN    melatonin tablet 6 mg Nightly PRN    montelukast tablet 10 mg QHS    mupirocin 2 % ointment BID    naloxone 0.4 mg/mL injection 0.02 mg PRN    nystatin 100,000 unit/mL suspension 500,000 Units QID (WM & HS)    ondansetron disintegrating tablet 8 mg Q8H PRN     predniSONE tablet 40 mg Daily    sevelamer carbonate tablet 800 mg TID WM    sodium chloride 0.9% flush 10 mL Q12H PRN    ticagrelor tablet 90 mg BID       Objective:     Vital Signs (Most Recent):  Temp: 98.5 °F (36.9 °C) (08/29/24 0814)  Pulse: 91 (08/29/24 0814)  Resp: 18 (08/29/24 0814)  BP: 122/67 (08/29/24 0814)  SpO2: 98 % (08/29/24 0727) Vital Signs (24h Range):  Temp:  [97.8 °F (36.6 °C)-98.5 °F (36.9 °C)] 98.5 °F (36.9 °C)  Pulse:  [] 91  Resp:  [16-20] 18  SpO2:  [95 %-99 %] 98 %  BP: (102-161)/(44-85) 122/67     Weight: 71.7 kg (158 lb 1.1 oz) (08/29/24 0449)  Body mass index is 22.05 kg/m².  Body surface area is 1.9 meters squared.    I/O last 3 completed shifts:  In: 300 [Blood:300]  Out: -      Physical Exam  Constitutional:       General: He is not in acute distress.  HENT:      Head: Normocephalic and atraumatic.   Eyes:      Pupils: Pupils are equal, round, and reactive to light.   Cardiovascular:      Rate and Rhythm: Normal rate and regular rhythm.      Heart sounds: No murmur heard.     No friction rub. No gallop.   Pulmonary:      Breath sounds: No wheezing or rales.   Abdominal:      General: Bowel sounds are normal.      Tenderness: There is no abdominal tenderness. There is no guarding.   Musculoskeletal:      Right lower leg: No edema.      Left lower leg: No edema.   Neurological:      Mental Status: He is alert and oriented to person, place, and time.   Psychiatric:         Behavior: Behavior normal.          Significant Labs:  BMP:   Recent Labs   Lab 08/25/24  1513 08/26/24  0447 08/29/24  0715   GLU 99   < > 82      < > 137   K 5.9*   < > 4.8      < > 106   CO2 25   < > 26   BUN 58*   < > 69*   CREATININE 7.96*   < > 6.10*   CALCIUM 8.6   < > 8.2*   MG 2.8*  --   --     < > = values in this interval not displayed.     CBC:   Recent Labs   Lab 08/29/24  0715   WBC 5.55   RBC 3.08*   HGB 9.2*   HCT 28.5*   PLT 46*   MCV 92.5   MCH 29.9   MCHC 32.3        Significant  Imaging:    Assessment/Plan:     Pulmonary  Chronic obstructive pulmonary disease with acute exacerbation  He has severe COPD.  He has required multiple admissions for exacerbations recently.  He requires home O2.  No wheezes present this a.m.    Cardiac/Vascular  CAD (coronary artery disease)  Heart catheterization 08/27/2024.  Results noted    Hypertension  Controlled    Renal/  Hyperkalemia  Resolved after dialysis yesterday    ESRD (end stage renal disease)  Plan dialysis tomorrow        Thank you for your consult.     Smooth Herrera MD  Nephrology  [facility] - [address] [place] [address]

## 2024-08-29 NOTE — ASSESSMENT & PLAN NOTE
Patient presented with SOB, denies chest pain  Troponin trend 569 --> 553.  EKG without acute ST changes.   Echocardiogram suggestive of wall motion abnormalities, EF 45%.   Heparin drip and Brillinta started, resume aspirin, statin and beta blocker.  Cardiology consulted, s/p [facility] which showed 65% in-stent restenosis- medical management recommended.  Cardiology has discontinued aspirin due to thrombocytopenia.

## 2024-08-29 NOTE — ASSESSMENT & PLAN NOTE
Presented with volume overload.   Creatine stable for now. BMP reviewed- noted Estimated Creatinine Clearance: 10.6 mL/min (A) (based on SCr of 6.1 mg/dL (H)). according to latest data. Based on current GFR, CKD stage is end stage.  Monitor UOP and serial BMP and adjust therapy as needed. Renally dose meds. Avoid nephrotoxic medications and procedures.  Dialysis Monday, Wednesday, Friday  Nephrology consulted, appreciate assistance.

## 2024-08-29 NOTE — RESPIRATORY THERAPY
Treatment given by RT student, Jolanta Feng at 07:22 and 07:27.   08/29/24 0722   Patient Assessment/Suction   Level of Consciousness (AVPU) alert   Respiratory Effort Unlabored   Expansion/Accessory Muscles/Retractions no retractions;no use of accessory muscles   All Lung Fields Breath Sounds Anterior:;Lateral:;coarse;equal bilaterally   Rhythm/Pattern, Respiratory unlabored;pattern regular;depth regular;no shortness of breath reported   Cough Frequency infrequent   Cough Type good;nonproductive   PRE-TX-O2   Device (Oxygen Therapy) nasal cannula   $ Is the patient on Low Flow Oxygen? Yes   Flow (L/min) (Oxygen Therapy) 1   Oxygen Concentration (%) 24   SpO2 98 %   Pulse Oximetry Type Intermittent   $ Pulse Oximetry - Multiple Charge Pulse Oximetry - Multiple   Pulse 88   Resp 16   Positioning   Body Position position maintained   Head of Bed (HOB) Positioning HOB elevated   Aerosol Therapy   $ Aerosol Therapy Charges Aerosol Treatment   Daily Review of Necessity (SVN) completed   Respiratory Treatment Status (SVN) given   Treatment Route (SVN) oxygen;mask   Patient Position HOB elevated   Post Treatment Assessment (SVN) breath sounds unchanged   Signs of Intolerance (SVN) none   Breath Sounds Post-Respiratory Treatment   Throughout All Fields Post-Treatment All Fields   Throughout All Fields Post-Treatment Anterior:;Lateral:;no change   Post-treatment Heart Rate (beats/min) 92   Post-treatment Resp Rate (breaths/min) 18   Respiratory Evaluation   $ Care Plan Tech Time 15 min

## 2024-08-29 NOTE — ASSESSMENT & PLAN NOTE
He has severe COPD.  He has required multiple admissions for exacerbations recently.  He requires home O2.  No wheezes present this a.m.

## 2024-08-29 NOTE — PLAN OF CARE
[facility] - [facility] Telemetry  Discharge Final Note    Primary Care Provider: Norma Primary Doctor    Expected Discharge Date: 8/29/2024    Final Discharge Note (most recent)       Final Note - 08/29/24 1120          Final Note    Assessment Type Final Discharge Note     Anticipated Discharge Disposition Skilled Nursing Facility     What phone number can be called within the next 1-3 days to see how you are doing after discharge? [phone]        Post-Acute Status    Post-Acute Authorization Placement     Post-Acute Placement Status Set-up Complete/Auth obtained     Coverage Medicare     Patient choice form signed by patient/caregiver List with quality metrics by geographic area provided;List from CMS Compare;List from System Post-Acute Care     Discharge Delays None known at this time                     Important Message from Medicare  Important Message from Medicare regarding Discharge Appeal Rights: Given to patient/caregiver, Explained to patient/caregiver, Signed/date by patient/caregiver     Date IMM was signed: 08/29/24  Time IMM was signed: 1130     Follow-up providers       Page Muir FNP   Specialty: Cardiology    [address]  [facility]  [facility] [address]   Phone: [phone]       Next Steps: Follow up on 9/10/2024    Instructions: Appointment scheduled with cardiology on 9/10/2024 at 3:00pm              After-discharge care                Destination       [facility]   Service: Skilled Nursing    [address]  [facility] [address]   Phone: [phone]                             Pt to dc to [facility]. [place] paperwork faxed to [facility]. IM updated. No further needs noted.

## 2024-08-29 NOTE — PLAN OF CARE
Per MD, pt would benefit from returning to [facility], pt choice obtained for [facility]. SS spoke with [facility], all admissions coordinators are in a meeting currently, ss instructed to call back at 0945. SS following    0918: SS spoke with Carol at [facility], she is checking to see if they have a bed available at [facility]. SS to fax referral as soon as PT Eval is documented. SS following    1035: SS spoke with Carol at [facility], pt is accepted to return to [facility] snf today. [facility] can pick pt up at 1200. SS to fax PT Eval when completed. SS started packet and notified MD. SS following    1115: SS faxed dc paperwork to [facility]. PT eval note still not entered, ss has reached out to VIBHA Gomez twice via secure chat with no response. SS to fax PT eval when note is completed. SS placed packet in [email or web address]. SS following

## 2024-08-29 NOTE — ASSESSMENT & PLAN NOTE
Patient with known CAD s/p  unknown, patient on Brilinta , which is uncontrolled as suspect ACS on presentation.  Will continue Brillinta and statin, and monitor for S/Sx of angina/ACS.   Continue to monitor on telemetry.

## 2024-08-29 NOTE — ASSESSMENT & PLAN NOTE
He has severe COPD.  He has required multiple admissions for exacerbations recently.  He requires home O2.  He is wheezing today

## 2024-08-29 NOTE — PT/OT/SLP EVAL
Physical Therapy Evaluation     Patient Name: Michoacano Damon   MRN: [id number]  Recent Surgery: Procedure(s) (LRB):  Angiogram, Coronary, with Left Heart Cath (N/A) 2 Days Post-Op    Recommendations:     Discharge Recommendations: Moderate Intensity Therapy   Discharge Equipment Recommendations: none   Barriers to discharge: Increased level of assist and Ongoing medical treatment    Assessment:     Michoacano Damon is a 74 y.o. male admitted with a medical diagnosis of Type 2 MI (myocardial infarction). He presents with the following impairments/functional limitations: weakness, impaired endurance, impaired functional mobility, gait instability, impaired balance, decreased safety awareness, pain, impaired cardiopulmonary response to activity. Pt reports he has not ambulated in approx 3wks.     Rehab Prognosis: Good; patient would benefit from acute PT services to address these deficits and reach maximum level of function.    Plan:     During this hospitalization, patient to be seen 5 x/week to address the above listed problems via gait training, therapeutic activities, therapeutic exercises, neuromuscular re-education    Plan of Care Expires: 09/28/24    Subjective     Chief Complaint: MI  Patient Comments/Goals: agreeable  Pain/Comfort:       Social History:  Living Environment: Patient lives alone in a second floor apartment with elevator  Prior Level of Function: Prior to admission, patient was modified independent  Equipment Used at Home: cane, straight, walker, rolling, grab bar, shower chair  DME owned (not currently used): none  Assistance Upon Discharge: facility staff    Objective:     Communicated with RN prior to session. Patient found HOB elevated with oxygen, telemetry, peripheral IV upon PT entry to room.    General Precautions: Standard, fall   Orthopedic Precautions: N/A   Braces:      Respiratory Status: Nasal cannula, flow 3 L/min    Exams:  Cognition: Patient is oriented to Person, Place, Time, Situation  RLE  ROM: WFL  RLE Strength: Deficits: 4-/5  LLE ROM: WFL  LLE Strength: Deficits: 4-/5  Sensation: -       Intact    Functional Mobility:  Gait belt applied - Yes  Bed Mobility  Supine to Sit: minimum assistance and of 2 persons for LE management and trunk management  Sit to Supine: minimum assistance and of 2 persons for LE management and trunk management  Transfers  Sit to Stand: moderate assistance and of 2 persons with hand-held assist and with cues for hand placement and foot placement  Gait  Patient ambulated steps at bedside with hand-held assist and minimum assistance, moderate assistance, and of 2 persons. Patient demonstrates unsteady gait, decreased step length, wide base of support, decreased weight shift, decreased foot clearance, flexed posture, and decreased arleen. . All lines remained intact throughout ambulation trail.  Balance  Sitting: minimum assistance  Standing: minimum assistance and of 2 persons      Therapeutic Activities and Exercises:   Patient educated on role of acute care PT and PT POC, safety while in hospital including calling nurse for mobility, and call light usage  Patient educated about importance of OOB mobility and remaining up in chair most of the day.      AM-PAC 6 CLICK MOBILITY  Total Score:     Patient left right sidelying with HOB elevated with all lines intact, call button in reach, and RN notified.    GOALS:   Multidisciplinary Problems       Physical Therapy Goals          Problem: Physical Therapy    Goal Priority Disciplines Outcome Goal Variances Interventions   Physical Therapy Goal     PT, PT/OT Progressing     Description: Short Term Goals to be met by: 24    Patient will increase functional independence with mobility by performin. Supine to sit with contact guard assist  2. Sit to stand transfer with contact guard assist using Rolling walker  3. Bed to chair transfer with contact guard assist using Rolling walker  4. Gait  x 100 feet with contact guard  assist using Rolling walker  5. Lower extremity exercise program x30 reps per handout, with assistance as needed    Long Term Goals to be met by: 9/28/24    Pt will regain full independent functional mobility with Rolling walker to return to home situation and prior activities of daily living.                        History:     Past Medical History:   Diagnosis Date    Anticoagulant long-term use     COPD (chronic obstructive pulmonary disease)     Coronary artery disease     ESRD on dialysis     HFrEF (heart failure with reduced ejection fraction) 2021    Hypertension        Past Surgical History:   Procedure Laterality Date    AV FISTULA PLACEMENT, BRACHIOCEPHALIC      INSERTION OF DIALYSIS CATHETER         Time Tracking:     PT Received On: 08/28/24  PT Start Time: 1517  PT Stop Time: 1530  PT Total Time (min): 13 min     Billable Minutes: Evaluation moderate complexity    8/29/2024

## 2024-08-29 NOTE — ASSESSMENT & PLAN NOTE
Chronic, uncontrolled. Latest blood pressure and vitals reviewed-     Temp:  [97.8 °F (36.6 °C)-98.5 °F (36.9 °C)]   Pulse:  []   Resp:  [16-20]   BP: (102-161)/(34-85)   SpO2:  [95 %-99 %] .   Home meds for hypertension were reviewed and noted below.   Hypertension Medications               carvediloL (COREG) 12.5 MG tablet Take 12.5 mg by mouth 2 (two) times daily.    hydrALAZINE (APRESOLINE) 100 MG tablet Take 1 tablet (100 mg total) by mouth 3 (three) times daily.            While in the hospital, will manage blood pressure as follows; Continue home antihypertensive regimen    Will utilize p.r.n. blood pressure medication only if patient's blood pressure greater than 180/110 and he develops symptoms such as worsening chest pain or shortness of breath.

## 2024-08-29 NOTE — DISCHARGE SUMMARY
[facility] - [address] [place] [address] Medicine  Discharge Summary      Patient Name: Michoacano Daomn  MRN: [id number]  [facility]: [id number]  Patient Class: IP- Inpatient  Admission Date: 8/25/2024  Hospital Length of Stay: 4 days  Discharge Date and Time:  08/29/2024 11:16 AM  Attending Physician: Joel Sellers MD   Discharging Provider: JOEL SELLERS MD  Primary Care Provider: Norma Primary Doctor    Primary Care Team: Networked reference to record PCT     HPI:   Patient is a 74-year-old male who presents to [facility] via EMS, [facility] with c/o Weakness and SOB.  Patient has a past medical history of COPD, CAD, ESRD on dialysis MWF and HFrEF.  Patient states he is more short of breath than usual and this started today.  He states he is unable to walk due to his shortness of breath and weakness.  Patient states he is compliant with his medications and that he does not missed dialysis.  Patient also has a complaint of mouth pain.  He points to the left lower gumline to a point of tenderness.  Patient has no teeth at all.  Patient was recently seen at [facility] pulmonology 5 days ago for checkup.  Patient denies headache, chest pain, abdominal pain or lower extremity edema.    Workup in the ED shows Troponin 569, CXR - Findings suggest mild cardiac decompensation and / or pneumonitis.    Patient will be admitted to [facility] for continued care and medical management.        Procedure(s) (LRB):  Angiogram, Coronary, with Left Heart Cath (N/A)      Hospital Course:   8/26- Undergoing HD today, cardiology consulted, echocardiogram pending.   8/27- Echo suggestive of wall motion changes, cardiology follow, awaiting recommendations regarding ischemic eval.   8/28 s/p LHC yesterday which showed 65% instent restenosis in Left Circ- medical management recommended. Hgb noted to be 2 points or so lower all of a sudden, getting transfusion.   8/29- Hgb  remain stable. Discharge back to SNF. Hematology referral on discharge for thrombocytopenia. Cardiology has stopped aspirin for thrombocytopenia, continue Brillinta.      Goals of Care Treatment Preferences:  Code Status: Full Code      SDOH Screening:  The patient was screened for utility difficulties, food insecurity, transport difficulties, housing insecurity, and interpersonal safety and there were no concerns identified this admission.     Consults:   Consults (From admission, onward)          Status Ordering Provider     Inpatient consult to Cardiology  Once        Provider:  Genaro Lomeli MD    Completed YANN PEREZ     Inpatient consult to Nephrology  Once        Provider:  Smooth Herrera MD    Completed DEJAH GIANG            ENT  Jaw pain  CT M/F without abnormality.       Pulmonary  Chronic obstructive pulmonary disease with acute exacerbation  On O2 at baseline.  Patient's COPD is with exacerbation noted by continued dyspnea and use of accessory muscles for breathing currently.    Patient is currently off COPD Pathway.   Continue scheduled inhalers, Steroids and Supplemental oxygen and monitor respiratory status closely.   Completed enough days with antibiotics.   Transition steroids to PO- taper on discharge.     Acute respiratory failure with hypoxia and hypercarbia  Patient with Hypercapnic and Hypoxic Respiratory failure which is Acute on chronic.  he is on home oxygen at 2 LPM. Supplemental oxygen was provided and noted- Oxygen Concentration (%):  [24] 24    .   Signs/symptoms of respiratory failure include- tachypnea and respiratory distress. Contributing diagnoses includes - CHF and COPD Labs and images were reviewed. Patient Has recent ABG, which has been reviewed. Will treat underlying causes and adjust management of respiratory failure as follows-     Continue volume removal with HD, steroids/antibiotics.   Wean O2 as able- O2 requirement is improved, on 1L NC now.      Cardiac/Vascular  * Type 2 MI (myocardial infarction)  Patient presented with SOB, denies chest pain  Troponin trend 569 --> 553.  EKG without acute ST changes.   Echocardiogram suggestive of wall motion abnormalities, EF 45%.   Heparin drip and Brillinta started, resume aspirin, statin and beta blocker.  Cardiology consulted, s/p C which showed 65% in-stent restenosis- medical management recommended.  Cardiology has discontinued aspirin due to thrombocytopenia.         Wall motion abnormality of inferior wall of left ventricle  As above.       CAD (coronary artery disease)  Patient with known CAD s/p  unknown, patient on Brilinta , which is uncontrolled as suspect ACS on presentation.  Will continue Brillinta and statin, and monitor for S/Sx of angina/ACS.   Continue to monitor on telemetry.     Hypertension  Chronic, uncontrolled. Latest blood pressure and vitals reviewed-     Temp:  [97.8 °F (36.6 °C)-98.5 °F (36.9 °C)]   Pulse:  []   Resp:  [16-20]   BP: (102-161)/(34-85)   SpO2:  [95 %-99 %] .   Home meds for hypertension were reviewed and noted below.   Hypertension Medications               carvediloL (COREG) 12.5 MG tablet Take 12.5 mg by mouth 2 (two) times daily.    hydrALAZINE (APRESOLINE) 100 MG tablet Take 1 tablet (100 mg total) by mouth 3 (three) times daily.            While in the hospital, will manage blood pressure as follows; Continue home antihypertensive regimen    Will utilize p.r.n. blood pressure medication only if patient's blood pressure greater than 180/110 and he develops symptoms such as worsening chest pain or shortness of breath.    Acute on chronic HFrEF (heart failure with reduced ejection fraction)  Echo    Result Date: 8/26/2024    Left Ventricle: The left ventricle is smaller than normal. There is   severe concentric hypertrophy. Regional wall motion abnormalities present.    Inferior wall hypokinesis is noted There is mildly reduced systolic   function with a  "visually estimated ejection fraction of 40 - 50%. Ejection   fraction by visual approximation is 45%. There is diastolic dysfunction.    Right Ventricle: Normal right ventricular cavity size.    Aortic Valve: The aortic valve is a trileaflet valve. Mildly calcified   cusps.    Tricuspid Valve: There is mild regurgitation. There is mild pulmonary   hypertension.    IVC/SVC: Normal venous pressure at 3 mmHg.    Pericardium: There is a trivial effusion.         Continue Beta Blocker and monitor clinical status closely. Monitor on telemetry. Patient is off CHF pathway.  Monitor strict Is&Os and daily weights.  Place on fluid restriction of 1.5 L. Cardiology has not been consulted. Continue to stress to patient importance of self efficacy and  on diet for CHF. Last BNP reviewed- and noted below No results for input(s): "BNP", "BNPTRIAGEBLO" in the last 168 hours.   Volume removal with HD.       Renal/  Hyperkalemia  Hyperkalemia is likely due to ESRD.The patients most recent potassium results are listed below.  Recent Labs     08/28/24  0901 08/28/24  1940 08/29/24  0715   K 6.3* 5.0 4.8       Plan  - Monitor for arrhythmias with EKG and/or continuous telemetry.   - Treat the hyperkalemia with  Dialysis .   - Monitor potassium: Daily  - The patient's hyperkalemia is resolved.             ESRD (end stage renal disease)  Presented with volume overload.   Creatine stable for now. BMP reviewed- noted Estimated Creatinine Clearance: 10.6 mL/min (A) (based on SCr of 6.1 mg/dL (H)). according to latest data. Based on current GFR, CKD stage is end stage.  Monitor UOP and serial BMP and adjust therapy as needed. Renally dose meds. Avoid nephrotoxic medications and procedures.  Dialysis Monday, Wednesday, Friday  Nephrology consulted, appreciate assistance.    ID  Thrush  Nystatin swish and swallow.  Magic mouth wash and chlorseptic spray PRN.       Hematology  Thrombocytopenia  The likely etiology of thrombocytopenia is  " could be from DAPT vs hematologic process, ?HIT . The patients 3 most recent labs are listed below.  Recent Labs     08/27/24  1040 08/28/24  0901 08/29/24  0715   PLT 66* 54* 46*       Plan  - Will transfuse if platelet count is <50k (if undergoing surgical procedure or have active bleeding).  - Hematology referral on discharge.       Oncology  Anemia of renal disease  Anemia is likely due to chronic disease due to ESRD. Cannot rule out acute blood loss given drop of > 2 points in Hgb over last 24-48 hours.   Most recent hemoglobin and hematocrit are listed below.  Recent Labs     08/28/24  0901 08/28/24 2045 08/29/24  0715   HGB 10.0* 9.1* 9.2*   HCT 30.7* 29.0* 28.5*       Plan  - Monitor serial CBC: Daily  - Transfuse PRBC if patient becomes hemodynamically unstable, symptomatic or H/H drops below 7/21.  - Patient has received 2 units of PRBCs on 8/28  - Patient's anemia is currently improving  - Can workup further outpatient.       Final Active Diagnoses:    Diagnosis Date Noted POA    PRINCIPAL PROBLEM:  Type 2 MI (myocardial infarction) [I21.A1] 08/25/2024 Yes    Acute respiratory failure with hypoxia and hypercarbia [J96.01, J96.02] 08/26/2024 Yes    Acute on chronic HFrEF (heart failure with reduced ejection fraction) [I50.23] 03/19/2021 Yes    ESRD (end stage renal disease) [N18.6] 03/19/2021 Yes    Hyperkalemia [E87.5] 08/26/2024 Yes    Chronic obstructive pulmonary disease with acute exacerbation [J44.1]  Yes    Thrush [B37.0] 08/28/2024 Yes    Anemia of renal disease [N18.9, D63.1] 08/28/2024 Yes    Thrombocytopenia [D69.6] 08/28/2024 Yes    Jaw pain [R68.84] 08/27/2024 Yes    Wall motion abnormality of inferior wall of left ventricle [R94.30] 08/27/2024 Yes    Hypertension [I10] 03/19/2021 Yes    CAD (coronary artery disease) [I25.10] 03/19/2021 Yes      Problems Resolved During this Admission:       Discharged Condition: fair    Disposition: Skilled Nursing Facility    Follow Up:   Contact  information for follow-up providers       Page Muir FNP Follow up on 9/10/2024.    Specialty: Cardiology  Why: Appointment scheduled with cardiology on 9/10/2024 at 3:00pm  Contact information:  [address]  [facility] [address]  [phone]                       Contact information for after-discharge care       Destination       [facility] .    Service: Skilled Nursing  Contact information:  [address]  [facility] [address]  [phone]                                 Patient Instructions:      Ambulatory referral/consult to Hematology / Oncology   Standing Status: Future   Referral Priority: Routine Referral Type: Consultation   Referral Reason: Specialty Services Required   Requested Specialty: Hematology   Number of Visits Requested: 1       Significant Diagnostic Studies: Labs: BMP:   Recent Labs   Lab 08/28/24  0901 08/28/24 1940 08/29/24  0715   *  --  82     --  137   K 6.3* 5.0 4.8     --  106   CO2 27  --  26   *  --  69*   CREATININE 7.96*  --  6.10*   CALCIUM 8.7  --  8.2*    and CBC   Recent Labs   Lab 08/28/24  0901 08/28/24 2045 08/29/24  0715   WBC 7.72  --  5.55   HGB 10.0* 9.1* 9.2*   HCT 30.7* 29.0* 28.5*   PLT 54*  --  46*       Pending Diagnostic Studies:       Procedure Component Value Units Date/Time    EXTRA TUBES [[id number]] Collected: 08/26/24 0956    Order Status: Sent Lab Status: In process Updated: 08/27/24 0526    Specimen: Blood, Venous     Narrative:      The following orders were created for panel order EXTRA TUBES.  Procedure                               Abnormality         Status                     ---------                               -----------         ------                     Red Top Hold[[id number]]                                    In process                 Red Top Hold[[id number]]                                                               [place]  Top Hold[[id number]]                                                            Please view results for these tests on the individual orders.    EXTRA TUBES [[id number]] Collected: 08/26/24 0640    Order Status: Sent Lab Status: In process Updated: 08/26/24 0646    Specimen: Blood, Venous     Narrative:      The following orders were created for panel order EXTRA TUBES.  Procedure                               Abnormality         Status                     ---------                               -----------         ------                     Light Green Top Hold[[id number]]                            In process                 L[place]ender Top Hold[[id number]]                               In process                   Please view results for these tests on the individual orders.    Occult blood x 1, stool [[id number]]     Order Status: Sent Lab Status: No result     Specimen: Stool            Medications:  Reconciled Home Medications:      Medication List        START taking these medications      atorvastatin 80 MG tablet  Commonly known as: LIPITOR  Take 1 tablet (80 mg total) by mouth once daily.  Start taking on: August 30, 2024  Replaces: rosuvastatin 20 MG tablet     diphenhydrAMINE-aluminum-magnesium hydroxide-simethicone-LIDOcaine viscous HCl 2%  Swish and spit 15 mLs every 4 (four) hours as needed (oral pain).     nystatin 100,000 unit/mL suspension  Commonly known as: MYCOSTATIN  Take 5 mLs (500,000 Units total) by mouth 4 (four) times daily with meals and nightly. for 10 days            CHANGE how you take these medications      predniSONE 10 MG tablet  Commonly known as: DELTASONE  Take 4 tablets (40 mg total) by mouth once daily for 3 days, THEN 3 tablets (30 mg total) once daily for 3 days, THEN 2 tablets (20 mg total) once daily for 3 days, THEN 1 tablet (10 mg total) once daily for 3 days.  Start taking on: August 30, 2024  What changed:   medication strength  See the new instructions.             CONTINUE taking these medications      albuterol-ipratropium 2.5 mg-0.5 mg/3 mL nebulizer solution  Commonly known as: DUO-NEB  Take 3 mLs by nebulization every 6 (six) hours as needed for Wheezing. Rescue     arformoteroL 15 mcg/2 mL Nebu  Commonly known as: BROVANA  Take 15 mcg by nebulization 2 (two) times a day. Controller     BRILINTA 90 mg tablet  Generic drug: ticagrelor  Take 1 tablet (90 mg total) by mouth 2 (two) times a day.     budesonide 0.5 mg/2 mL nebulizer solution  Commonly known as: PULMICORT  Take 0.5 mg by nebulization 2 (two) times a day.     carvediloL 12.5 MG tablet  Commonly known as: COREG  Take 12.5 mg by mouth 2 (two) times daily.     hydrALAZINE 100 MG tablet  Commonly known as: APRESOLINE  Take 1 tablet (100 mg total) by mouth 3 (three) times daily.     HYDROcodone-acetaminophen  mg per tablet  Commonly known as: NORCO  Take 1 tablet by mouth 3 (three) times daily as needed for pain     montelukast 10 mg tablet  Commonly known as: SINGULAIR  Take 1 tablet (10 mg total) by mouth every evening.     olopatadine 0.1 % ophthalmic solution  Commonly known as: PATANOL  Place 1 drop into both eyes once daily.     ROFLUMILAST ORAL  Take 500 mcg by mouth Daily.     sevelamer carbonate 800 mg Tab  Commonly known as: RENVELA  Take 800 mg by mouth 3 (three) times daily with meals.            STOP taking these medications      aspirin 81 MG EC tablet  Commonly known as: ECOTRIN     rosuvastatin 20 MG tablet  Commonly known as: CRESTOR  Replaced by: atorvastatin 80 MG tablet              Indwelling Lines/Drains at time of discharge:   Lines/Drains/Airways       Drain  Duration                  Hemodialysis AV Fistula Right forearm -- days                    Time spent on the discharge of patient: 42 minutes         LEV HERNANDEZ MD  Department of Hospital Medicine  [facility] Telemetry

## 2024-08-29 NOTE — PLAN OF CARE
Problem: Physical Therapy  Goal: Physical Therapy Goal  Description: Short Term Goals to be met by: 24    Patient will increase functional independence with mobility by performin. Supine to sit with contact guard assist  2. Sit to stand transfer with contact guard assist using Rolling walker  3. Bed to chair transfer with contact guard assist using Rolling walker  4. Gait  x 100 feet with contact guard assist using Rolling walker  5. Lower extremity exercise program x30 reps per handout, with assistance as needed    Long Term Goals to be met by: 24    Pt will regain full independent functional mobility with Rolling walker to return to home situation and prior activities of daily living.   Outcome: Progressing    PT eval completed. Please see eval note for details.

## 2024-08-29 NOTE — ASSESSMENT & PLAN NOTE
On O2 at baseline.  Patient's COPD is with exacerbation noted by continued dyspnea and use of accessory muscles for breathing currently.    Patient is currently off COPD Pathway.   Continue scheduled inhalers, Steroids and Supplemental oxygen and monitor respiratory status closely.   Completed enough days with antibiotics.   Transition steroids to PO- taper on discharge.

## 2024-08-29 NOTE — ASSESSMENT & PLAN NOTE
The likely etiology of thrombocytopenia is  could be from DAPT vs hematologic process, ?HIT . The patients 3 most recent labs are listed below.  Recent Labs     08/27/24  1040 08/28/24  0901 08/29/24  0715   PLT 66* 54* 46*       Plan  - Will transfuse if platelet count is <50k (if undergoing surgical procedure or have active bleeding).  - Hematology referral on discharge.

## 2024-08-29 NOTE — PLAN OF CARE
Problem: Adult Inpatient Plan of Care  Goal: Plan of Care Review  Outcome: Met  Goal: Patient-Specific Goal (Individualized)  Outcome: Met  Goal: Absence of Hospital-Acquired Illness or Injury  Outcome: Met  Goal: Optimal Comfort and Wellbeing  Outcome: Met  Goal: Readiness for Transition of Care  Outcome: Met     Problem: Skin Injury Risk Increased  Goal: Skin Health and Integrity  Outcome: Met     Problem: Hemodialysis  Goal: Safe, Effective Therapy Delivery  Outcome: Met  Goal: Effective Tissue Perfusion  Outcome: Met  Goal: Absence of Infection Signs and Symptoms  Outcome: Met     Problem: Gas Exchange Impaired  Goal: Optimal Gas Exchange  Outcome: Met

## 2024-09-27 ENCOUNTER — OUTSIDE PLACE OF SERVICE (OUTPATIENT)
Dept: ADMINISTRATIVE | Facility: HOSPITAL | Age: 74
End: 2024-09-27
Payer: MEDICARE

## 2024-09-27 PROCEDURE — 90935 HEMODIALYSIS ONE EVALUATION: CPT | Mod: ,,, | Performed by: INTERNAL MEDICINE

## 2024-09-30 ENCOUNTER — OUTSIDE PLACE OF SERVICE (OUTPATIENT)
Dept: ADMINISTRATIVE | Facility: HOSPITAL | Age: 74
End: 2024-09-30
Payer: MEDICARE

## 2024-09-30 PROCEDURE — 99232 SBSQ HOSP IP/OBS MODERATE 35: CPT | Mod: ,,, | Performed by: INTERNAL MEDICINE

## 2024-10-11 ENCOUNTER — HOSPITAL ENCOUNTER (INPATIENT)
Facility: HOSPITAL | Age: 74
LOS: 5 days | Discharge: NURSING, REHAB OR LONG-TERM CARE | DRG: 190 | End: 2024-10-16
Attending: FAMILY MEDICINE | Admitting: STUDENT IN AN ORGANIZED HEALTH CARE EDUCATION/TRAINING PROGRAM
Payer: MEDICARE

## 2024-10-11 DIAGNOSIS — R06.02 SOB (SHORTNESS OF BREATH): ICD-10-CM

## 2024-10-11 DIAGNOSIS — R53.81 DEBILITY: ICD-10-CM

## 2024-10-11 DIAGNOSIS — R07.9 CHEST PAIN: ICD-10-CM

## 2024-10-11 DIAGNOSIS — J96.01 ACUTE HYPOXEMIC RESPIRATORY FAILURE: ICD-10-CM

## 2024-10-11 DIAGNOSIS — I50.43 ACUTE ON CHRONIC COMBINED SYSTOLIC AND DIASTOLIC CONGESTIVE HEART FAILURE: ICD-10-CM

## 2024-10-11 DIAGNOSIS — J44.1 ACUTE EXACERBATION OF CHRONIC OBSTRUCTIVE PULMONARY DISEASE (COPD): ICD-10-CM

## 2024-10-11 DIAGNOSIS — J44.1 CHRONIC OBSTRUCTIVE PULMONARY DISEASE WITH ACUTE EXACERBATION: ICD-10-CM

## 2024-10-11 DIAGNOSIS — I95.0 IDIOPATHIC HYPOTENSION: ICD-10-CM

## 2024-10-11 DIAGNOSIS — G93.41 METABOLIC ENCEPHALOPATHY: Primary | ICD-10-CM

## 2024-10-11 DIAGNOSIS — I21.A1 TYPE 2 MI (MYOCARDIAL INFARCTION): ICD-10-CM

## 2024-10-11 DIAGNOSIS — G93.41 ACUTE METABOLIC ENCEPHALOPATHY: ICD-10-CM

## 2024-10-11 LAB
ALBUMIN SERPL BCP-MCNC: 2.1 G/DL (ref 3.5–5)
ALBUMIN/GLOB SERPL: 0.5 {RATIO}
ALP SERPL-CCNC: 97 U/L (ref 45–115)
ALT SERPL W P-5'-P-CCNC: 26 U/L (ref 16–61)
ANION GAP SERPL CALCULATED.3IONS-SCNC: 7 MMOL/L (ref 7–16)
AST SERPL W P-5'-P-CCNC: 39 U/L (ref 15–37)
BASOPHILS # BLD AUTO: 0.07 K/UL (ref 0–0.2)
BASOPHILS NFR BLD AUTO: 0.7 % (ref 0–1)
BILIRUB SERPL-MCNC: 0.3 MG/DL (ref ?–1.2)
BUN SERPL-MCNC: 17 MG/DL (ref 7–18)
BUN/CREAT SERPL: 5 (ref 6–20)
CALCIUM SERPL-MCNC: 9.2 MG/DL (ref 8.5–10.1)
CHLORIDE SERPL-SCNC: 104 MMOL/L (ref 98–107)
CO2 SERPL-SCNC: 27 MMOL/L (ref 21–32)
CREAT SERPL-MCNC: 3.23 MG/DL (ref 0.7–1.3)
DIFFERENTIAL METHOD BLD: ABNORMAL
EGFR (NO RACE VARIABLE) (RUSH/TITUS): 19 ML/MIN/1.73M2
EOSINOPHIL # BLD AUTO: 0.03 K/UL (ref 0–0.5)
EOSINOPHIL NFR BLD AUTO: 0.3 % (ref 1–4)
EOSINOPHIL NFR BLD MANUAL: 2 % (ref 1–4)
ERYTHROCYTE [DISTWIDTH] IN BLOOD BY AUTOMATED COUNT: 18.7 % (ref 11.5–14.5)
GLOBULIN SER-MCNC: 4 G/DL (ref 2–4)
GLUCOSE SERPL-MCNC: 91 MG/DL (ref 74–106)
GLUCOSE SERPL-MCNC: 93 MG/DL (ref 70–105)
HCO3 UR-SCNC: 28.5 MMOL/L (ref 21–28)
HCT VFR BLD AUTO: 36.3 % (ref 40–54)
HCT VFR BLD CALC: 33 % (ref 35–51)
HGB BLD-MCNC: 10.6 G/DL (ref 13.5–18)
IMM GRANULOCYTES # BLD AUTO: 0.6 K/UL (ref 0–0.04)
IMM GRANULOCYTES NFR BLD: 6.3 % (ref 0–0.4)
INR BLD: 1.1
LACTATE SERPL-SCNC: 0.4 MMOL/L (ref 0.4–2)
LDH SERPL L TO P-CCNC: 0.6 MMOL/L (ref 0.3–1.2)
LYMPHOCYTES # BLD AUTO: 0.59 K/UL (ref 1–4.8)
LYMPHOCYTES NFR BLD AUTO: 6.2 % (ref 27–41)
LYMPHOCYTES NFR BLD MANUAL: 1 % (ref 27–41)
MCH RBC QN AUTO: 29.1 PG (ref 27–31)
MCHC RBC AUTO-ENTMCNC: 29.2 G/DL (ref 32–36)
MCV RBC AUTO: 99.7 FL (ref 80–96)
METAMYELOCYTES NFR BLD MANUAL: 2 %
MONOCYTES # BLD AUTO: 1.44 K/UL (ref 0–0.8)
MONOCYTES NFR BLD AUTO: 15.2 % (ref 2–6)
MONOCYTES NFR BLD MANUAL: 8 % (ref 2–6)
MPC BLD CALC-MCNC: 10.1 FL (ref 9.4–12.4)
NEUTROPHILS # BLD AUTO: 6.74 K/UL (ref 1.8–7.7)
NEUTROPHILS NFR BLD AUTO: 71.3 % (ref 53–65)
NEUTS BAND NFR BLD MANUAL: 1 % (ref 1–5)
NEUTS SEG NFR BLD MANUAL: 86 % (ref 50–62)
NRBC # BLD AUTO: 0.07 X10E3/UL
NRBC, AUTO (.00): 0.7 %
PCO2 BLDA: 43 MMHG (ref 35–48)
PH SMN: 7.43 [PH] (ref 7.35–7.45)
PLATELET # BLD AUTO: 244 K/UL (ref 150–400)
PLATELET MORPHOLOGY: NORMAL
PO2 BLDA: 155 MMHG (ref 83–108)
POC BASE EXCESS: 3.7 MMOL/L (ref -2–3)
POC CO2: 29.8 MMOL/L
POC IONIZED CALCIUM: 1.08 MMOL/L (ref 1.15–1.35)
POC SATURATED O2: 99 % (ref 95–98)
POCT GLUCOSE: 96 MG/DL (ref 60–95)
POTASSIUM BLD-SCNC: 3.2 MMOL/L (ref 3.4–4.5)
POTASSIUM SERPL-SCNC: 3.7 MMOL/L (ref 3.5–5.1)
PROT SERPL-MCNC: 6.1 G/DL (ref 6.4–8.2)
PROTHROMBIN TIME: 14.1 SECONDS (ref 11.7–14.7)
RBC # BLD AUTO: 3.64 M/UL (ref 4.6–6.2)
RBC MORPH BLD: NORMAL
SODIUM BLD-SCNC: 133 MMOL/L (ref 136–145)
SODIUM SERPL-SCNC: 134 MMOL/L (ref 136–145)
TROPONIN I SERPL DL<=0.01 NG/ML-MCNC: 171.8 PG/ML
TROPONIN I SERPL DL<=0.01 NG/ML-MCNC: 191.6 PG/ML
WBC # BLD AUTO: 9.47 K/UL (ref 4.5–11)

## 2024-10-11 PROCEDURE — 94799 UNLISTED PULMONARY SVC/PX: CPT

## 2024-10-11 PROCEDURE — 25000242 PHARM REV CODE 250 ALT 637 W/ HCPCS: Performed by: EMERGENCY MEDICINE

## 2024-10-11 PROCEDURE — 85014 HEMATOCRIT: CPT

## 2024-10-11 PROCEDURE — 85025 COMPLETE CBC W/AUTO DIFF WBC: CPT | Performed by: FAMILY MEDICINE

## 2024-10-11 PROCEDURE — 80053 COMPREHEN METABOLIC PANEL: CPT | Performed by: FAMILY MEDICINE

## 2024-10-11 PROCEDURE — 82803 BLOOD GASES ANY COMBINATION: CPT

## 2024-10-11 PROCEDURE — 99285 EMERGENCY DEPT VISIT HI MDM: CPT | Mod: 25

## 2024-10-11 PROCEDURE — 27000221 HC OXYGEN, UP TO 24 HOURS

## 2024-10-11 PROCEDURE — 83605 ASSAY OF LACTIC ACID: CPT

## 2024-10-11 PROCEDURE — 84132 ASSAY OF SERUM POTASSIUM: CPT

## 2024-10-11 PROCEDURE — 84295 ASSAY OF SERUM SODIUM: CPT

## 2024-10-11 PROCEDURE — 82947 ASSAY GLUCOSE BLOOD QUANT: CPT

## 2024-10-11 PROCEDURE — 93005 ELECTROCARDIOGRAM TRACING: CPT

## 2024-10-11 PROCEDURE — 63600175 PHARM REV CODE 636 W HCPCS: Performed by: INTERNAL MEDICINE

## 2024-10-11 PROCEDURE — 63600175 PHARM REV CODE 636 W HCPCS: Performed by: EMERGENCY MEDICINE

## 2024-10-11 PROCEDURE — 11000001 HC ACUTE MED/SURG PRIVATE ROOM

## 2024-10-11 PROCEDURE — 85610 PROTHROMBIN TIME: CPT | Performed by: FAMILY MEDICINE

## 2024-10-11 PROCEDURE — 25000003 PHARM REV CODE 250: Performed by: INTERNAL MEDICINE

## 2024-10-11 PROCEDURE — 96374 THER/PROPH/DIAG INJ IV PUSH: CPT

## 2024-10-11 PROCEDURE — 84484 ASSAY OF TROPONIN QUANT: CPT | Performed by: FAMILY MEDICINE

## 2024-10-11 PROCEDURE — 99900035 HC TECH TIME PER 15 MIN (STAT)

## 2024-10-11 PROCEDURE — 99223 1ST HOSP IP/OBS HIGH 75: CPT | Mod: AI,,, | Performed by: INTERNAL MEDICINE

## 2024-10-11 PROCEDURE — 83605 ASSAY OF LACTIC ACID: CPT | Performed by: EMERGENCY MEDICINE

## 2024-10-11 PROCEDURE — 25000242 PHARM REV CODE 250 ALT 637 W/ HCPCS: Performed by: INTERNAL MEDICINE

## 2024-10-11 PROCEDURE — 94761 N-INVAS EAR/PLS OXIMETRY MLT: CPT

## 2024-10-11 PROCEDURE — 83880 ASSAY OF NATRIURETIC PEPTIDE: CPT | Performed by: INTERNAL MEDICINE

## 2024-10-11 PROCEDURE — 82962 GLUCOSE BLOOD TEST: CPT

## 2024-10-11 PROCEDURE — 82330 ASSAY OF CALCIUM: CPT

## 2024-10-11 PROCEDURE — 84484 ASSAY OF TROPONIN QUANT: CPT | Performed by: EMERGENCY MEDICINE

## 2024-10-11 RX ORDER — LEVOFLOXACIN 5 MG/ML
500 INJECTION, SOLUTION INTRAVENOUS ONCE
Status: COMPLETED | OUTPATIENT
Start: 2024-10-12 | End: 2024-10-12

## 2024-10-11 RX ORDER — CYPROHEPTADINE HYDROCHLORIDE 4 MG/1
4 TABLET ORAL 2 TIMES DAILY
Status: ON HOLD | COMMUNITY
Start: 2024-09-24

## 2024-10-11 RX ORDER — IPRATROPIUM BROMIDE AND ALBUTEROL SULFATE 2.5; .5 MG/3ML; MG/3ML
3 SOLUTION RESPIRATORY (INHALATION)
Status: COMPLETED | OUTPATIENT
Start: 2024-10-11 | End: 2024-10-11

## 2024-10-11 RX ORDER — HYDRALAZINE HYDROCHLORIDE 100 MG/1
100 TABLET, FILM COATED ORAL 3 TIMES DAILY
Status: DISCONTINUED | OUTPATIENT
Start: 2024-10-12 | End: 2024-10-12

## 2024-10-11 RX ORDER — PROCHLORPERAZINE EDISYLATE 5 MG/ML
5 INJECTION INTRAMUSCULAR; INTRAVENOUS EVERY 6 HOURS PRN
Status: DISCONTINUED | OUTPATIENT
Start: 2024-10-12 | End: 2024-10-17 | Stop reason: HOSPADM

## 2024-10-11 RX ORDER — ATORVASTATIN CALCIUM 80 MG/1
80 TABLET, FILM COATED ORAL DAILY
Status: DISCONTINUED | OUTPATIENT
Start: 2024-10-12 | End: 2024-10-17 | Stop reason: HOSPADM

## 2024-10-11 RX ORDER — HEPARIN SODIUM 5000 [USP'U]/ML
5000 INJECTION, SOLUTION INTRAVENOUS; SUBCUTANEOUS EVERY 12 HOURS
Status: DISCONTINUED | OUTPATIENT
Start: 2024-10-12 | End: 2024-10-17 | Stop reason: HOSPADM

## 2024-10-11 RX ORDER — ASPIRIN 81 MG/1
81 TABLET ORAL DAILY
Status: DISCONTINUED | OUTPATIENT
Start: 2024-10-12 | End: 2024-10-17 | Stop reason: HOSPADM

## 2024-10-11 RX ORDER — MONTELUKAST SODIUM 10 MG/1
10 TABLET ORAL NIGHTLY
Status: DISCONTINUED | OUTPATIENT
Start: 2024-10-12 | End: 2024-10-12

## 2024-10-11 RX ORDER — CARVEDILOL 6.25 MG/1
6.25 TABLET ORAL 2 TIMES DAILY
Status: DISCONTINUED | OUTPATIENT
Start: 2024-10-12 | End: 2024-10-17 | Stop reason: HOSPADM

## 2024-10-11 RX ORDER — GABAPENTIN 100 MG/1
100 CAPSULE ORAL 2 TIMES DAILY
Status: ON HOLD | COMMUNITY
Start: 2024-09-23

## 2024-10-11 RX ORDER — BUDESONIDE 0.5 MG/2ML
0.5 INHALANT ORAL EVERY 12 HOURS
Status: DISCONTINUED | OUTPATIENT
Start: 2024-10-12 | End: 2024-10-17 | Stop reason: HOSPADM

## 2024-10-11 RX ORDER — SODIUM CHLORIDE 0.9 % (FLUSH) 0.9 %
10 SYRINGE (ML) INJECTION EVERY 12 HOURS PRN
Status: DISCONTINUED | OUTPATIENT
Start: 2024-10-12 | End: 2024-10-17 | Stop reason: HOSPADM

## 2024-10-11 RX ORDER — PREDNISONE 10 MG/1
TABLET ORAL
Status: ON HOLD | COMMUNITY
Start: 2024-10-05 | End: 2024-10-15 | Stop reason: HOSPADM

## 2024-10-11 RX ORDER — IPRATROPIUM BROMIDE AND ALBUTEROL SULFATE 2.5; .5 MG/3ML; MG/3ML
3 SOLUTION RESPIRATORY (INHALATION) EVERY 6 HOURS
Status: DISCONTINUED | OUTPATIENT
Start: 2024-10-12 | End: 2024-10-17 | Stop reason: HOSPADM

## 2024-10-11 RX ORDER — NALOXONE HCL 0.4 MG/ML
0.02 VIAL (ML) INJECTION
Status: DISCONTINUED | OUTPATIENT
Start: 2024-10-12 | End: 2024-10-17 | Stop reason: HOSPADM

## 2024-10-11 RX ORDER — ROFLUMILAST 500 UG/1
500 TABLET ORAL DAILY
Status: DISCONTINUED | OUTPATIENT
Start: 2024-10-12 | End: 2024-10-17 | Stop reason: HOSPADM

## 2024-10-11 RX ORDER — ONDANSETRON HYDROCHLORIDE 2 MG/ML
4 INJECTION, SOLUTION INTRAVENOUS EVERY 8 HOURS PRN
Status: DISCONTINUED | OUTPATIENT
Start: 2024-10-12 | End: 2024-10-17 | Stop reason: HOSPADM

## 2024-10-11 RX ORDER — SEVELAMER CARBONATE 800 MG/1
800 TABLET, FILM COATED ORAL
Status: DISCONTINUED | OUTPATIENT
Start: 2024-10-12 | End: 2024-10-17 | Stop reason: HOSPADM

## 2024-10-11 RX ORDER — ROFLUMILAST 500 UG/1
500 TABLET ORAL DAILY
Status: ON HOLD | COMMUNITY
Start: 2024-09-25

## 2024-10-11 RX ORDER — METHYLPREDNISOLONE SOD SUCC 125 MG
125 VIAL (EA) INJECTION
Status: COMPLETED | OUTPATIENT
Start: 2024-10-11 | End: 2024-10-11

## 2024-10-11 RX ORDER — CYPROHEPTADINE HYDROCHLORIDE 4 MG/1
4 TABLET ORAL 2 TIMES DAILY
Status: DISCONTINUED | OUTPATIENT
Start: 2024-10-12 | End: 2024-10-17 | Stop reason: HOSPADM

## 2024-10-11 RX ORDER — LEVOFLOXACIN 5 MG/ML
250 INJECTION, SOLUTION INTRAVENOUS
Status: DISCONTINUED | OUTPATIENT
Start: 2024-10-14 | End: 2024-10-17 | Stop reason: HOSPADM

## 2024-10-11 RX ADMIN — METHYLPREDNISOLONE SODIUM SUCCINATE 125 MG: 125 INJECTION, POWDER, FOR SOLUTION INTRAMUSCULAR; INTRAVENOUS at 08:10

## 2024-10-11 RX ADMIN — IPRATROPIUM BROMIDE AND ALBUTEROL SULFATE 3 ML: 2.5; .5 SOLUTION RESPIRATORY (INHALATION) at 11:10

## 2024-10-11 RX ADMIN — IPRATROPIUM BROMIDE AND ALBUTEROL SULFATE 3 ML: .5; 3 SOLUTION RESPIRATORY (INHALATION) at 07:10

## 2024-10-11 RX ADMIN — METHYLPREDNISOLONE SODIUM SUCCINATE 40 MG: 40 INJECTION, POWDER, FOR SOLUTION INTRAMUSCULAR; INTRAVENOUS at 11:10

## 2024-10-11 RX ADMIN — TICAGRELOR 90 MG: 90 TABLET ORAL at 11:10

## 2024-10-11 NOTE — ED PROVIDER NOTES
Encounter Date: 10/11/2024    SCRIBE #1 NOTE: I, Hoang Pena, am scribing for, and in the presence of,  José Washington DO. I have scribed the entire note.       History     Chief Complaint   Patient presents with    Shortness of Breath     Pt picked up back EMS from dialysis for SOB.  EMS stated pt did about 2 1/2 hr of his dialysis treatment.       74 y.o.male presented to the ED for sob. He was at dialysis and he appeared to be sob. When he is at home he does have 2 liters of oxygen and is currently on oxygen in the ED. PT had HX of smoking and medical HX of HFrEF, Anticoagulant long-term use, COPD, Coronary artery disease, ESRD on dialysis, and Hypertension.            The history is provided by the patient.     Review of patient's allergies indicates:  No Known Allergies  Past Medical History:   Diagnosis Date    Anticoagulant long-term use     COPD (chronic obstructive pulmonary disease)     Coronary artery disease     ESRD on dialysis     HFrEF (heart failure with reduced ejection fraction) 2021    Hypertension      Past Surgical History:   Procedure Laterality Date    ANGIOGRAM, CORONARY, WITH LEFT HEART CATHETERIZATION N/A 8/27/2024    Procedure: Angiogram, Coronary, with Left Heart Cath;  Surgeon: Oziel Santos MD;  Location: [facility] CATH LAB;  Service: Cardiology;  Laterality: N/A;    AV FISTULA PLACEMENT, BRACHIOCEPHALIC      INSERTION OF DIALYSIS CATHETER       Family History   Problem Relation Name Age of Onset    Stroke Mother       Social History     Tobacco Use    Smoking status: Every Day     Current packs/day: 1.00     Average packs/day: 1 pack/day for 15.0 years (15.0 ttl pk-yrs)     Types: Cigarettes    Smokeless tobacco: Never   Substance Use Topics    Alcohol use: Not Currently    Drug use: Not Currently     Review of Systems   Respiratory:  Positive for shortness of breath.    All other systems reviewed and are negative.      Physical Exam     Initial Vitals [10/11/24 1528]    BP Pulse Resp Temp SpO2   (!) 120/49 (!) 117 18 98.1 °F (36.7 °C) 99 %      MAP       --         Physical Exam    Nursing note and vitals reviewed.  Constitutional: He appears well-developed and well-nourished.   HENT:   Head: Normocephalic and atraumatic.   Eyes: EOM are normal. Pupils are equal, round, and reactive to light.   Neck: Neck supple. No thyromegaly present.   Normal range of motion.  Cardiovascular:  Normal rate, regular rhythm, normal heart sounds and intact distal pulses.           No murmur heard.  Pulmonary/Chest: Breath sounds normal. No respiratory distress. He has no wheezes.   Abdominal: Abdomen is soft. Bowel sounds are normal. There is no abdominal tenderness.   Musculoskeletal:         General: No tenderness or edema. Normal range of motion.      Cervical back: Normal range of motion and neck supple.     Lymphadenopathy:     He has no cervical adenopathy.   Neurological: He is alert and oriented to person, place, and time. He has normal strength and normal reflexes. No cranial nerve deficit or sensory deficit. GCS score is 15. GCS eye subscore is 4. GCS verbal subscore is 5. GCS motor subscore is 6.   Skin: Skin is warm and dry. Capillary refill takes less than 2 seconds. No rash noted.   Psychiatric: He has a normal mood and affect.         ED Course   Procedures  Labs Reviewed   COMPREHENSIVE METABOLIC PANEL - Abnormal       Result Value    Sodium 134 (*)     Potassium 3.7      Chloride 104      CO2 27      Anion Gap 7      Glucose 91      BUN 17      Creatinine 3.23 (*)     BUN/Creatinine Ratio 5 (*)     Calcium 9.2      Total Protein 6.1 (*)     Albumin 2.1 (*)     Globulin 4.0      A/G Ratio 0.5      Bilirubin, Total 0.3      Alk Phos 97      ALT 26      AST 39 (*)     eGFR 19 (*)    TROPONIN I - Abnormal    Troponin I High Sensitivity 171.8 (*)    CBC WITH DIFFERENTIAL - Abnormal    WBC 9.47      RBC 3.64 (*)     Hemoglobin 10.6 (*)     Hematocrit 36.3 (*)     MCV 99.7 (*)     MCH  29.1      MCHC 29.2 (*)     RDW 18.7 (*)     Platelet Count 244      MPV 10.1      Neutrophils % 71.3 (*)     Lymphocytes % 6.2 (*)     Monocytes % 15.2 (*)     Eosinophils % 0.3 (*)     Basophils % 0.7      Immature Granulocytes % 6.3 (*)     nRBC, Auto 0.7 (*)     Neutrophils, Abs 6.74      Lymphocytes, Absolute 0.59 (*)     Monocytes, Absolute 1.44 (*)     Eosinophils, Absolute 0.03      Basophils, Absolute 0.07      Immature Granulocytes, Absolute 0.60 (*)     nRBC, Absolute 0.07 (*)     Diff Type Manual     TROPONIN I - Abnormal    Troponin I High Sensitivity 191.6 (*)    MANUAL DIFFERENTIAL - Abnormal    Segmented Neutrophils, Man % 86 (*)     Bands, Man % 1      Lymphocytes, Man % 1 (*)     Monocytes, Man % 8 (*)     Eosinophils, Man % 2      Metamyelocytes, Man % 2      Platelet Morphology Normal      RBC Morphology Normal     NT-PRO NATRIURETIC PEPTIDE - Abnormal    ProBNP 21,925 (*)    PROTIME-INR - Normal    PT 14.1      INR 1.10     LACTIC ACID, PLASMA - Normal    Lactic Acid 0.4     CULTURE, BLOOD   CULTURE, BLOOD   CBC W/ AUTO DIFFERENTIAL    Narrative:     The following orders were created for panel order CBC auto differential.  Procedure                               Abnormality         Status                     ---------                               -----------         ------                     CBC with Differential[[id number]]       Abnormal            Final result               Manual Differential[[id number]]         Abnormal            Final result                 Please view results for these tests on the individual orders.   RENAL FUNCTION PANEL   TROPONIN I   CBC W/ AUTO DIFFERENTIAL    Narrative:     The following orders were created for panel order CBC with Automated Differential.  Procedure                               Abnormality         Status                     ---------                               -----------         ------                     CBC with Differential[[id number]]                                                         Please view results for these tests on the individual orders.   CBC WITH DIFFERENTIAL   POCT GLUCOSE MONITORING CONTINUOUS    POC Glucose 93       EKG Readings: (Independently Interpreted)   Heart Rate: 113 bpm.   Interpreted by Dr. Washington      Sinus tachycardia with aberrantly conducted supraventricular complexes with PVC  Possible sequence error; V1, V2 omitted  Anterolateral ST-T abnormality may be due to myocardial ischemia  Abnormal ECG  Unconfirmed diagnosis        Imaging Results              CT Head Without Contrast (Final result)  Result time 10/11/24 21:47:14      Final result by Sailaja Chowdhury MD (10/11/24 21:47:14)                   Impression:      No acute abnormality.    Stable remote lacunar infarcts in the basal ganglia bilaterally.    Microvascular chronic ischemic changes.      Electronically signed by: Sailaja Chowdhury  Date:    10/11/2024  Time:    21:47               Narrative:    EXAMINATION:  CT HEAD WITHOUT CONTRAST    CLINICAL HISTORY:  Mental status change, unknown cause;    TECHNIQUE:  Low dose axial images were obtained through the head.  Coronal and sagittal reformations were also performed. Contrast was not administered.    COMPARISON:  05/08/2017 CT brain    FINDINGS:  There is no acute intra or extra-axial hemorrhage or hematoma.  The gray-white matter junction differentiation is intact.  There is no mass or mass effect.  Moderate white matter low density as seen with microvascular chronic ischemic changes.  Prominence of the ventricles, cisterns and sulci is consistent with the patient's age.  Remote lacunar infarcts are noted bilaterally in the basal ganglia.  Posterior fossa structures pituitary gland are unremarkable.  Imaged paranasal sinuses, mastoid air cells and middle ears are clear.  The bony calvarium is intact.                                       X-Ray Chest AP (Final result)  Result time 10/11/24 17:01:36       Final result by Kelley Brar MD (10/11/24 17:01:36)                   Impression:      Mild interstitial edema increased from prior exam otherwise stable chest.      Electronically signed by: Kelley Brar  Date:    10/11/2024  Time:    17:01               Narrative:    EXAMINATION:  XR CHEST AP PORTABLE    CLINICAL HISTORY:  sob;    TECHNIQUE:  Single frontal view of the chest was performed.    COMPARISON:  Prior study dated 08/25/2024 AP portable chest    FINDINGS:  Compared to the prior study the cardiomediastinal silhouette is stable.  Cardiac size is upper normal.  There is a tortuous descending aorta.  Right cardiac silhouette appears mildly prominent but stable compared to prior.  There is mild interstitial edema increased from prior exam.  No confluent opacity is seen.  There is no pneumothorax.  A vascular stent is noted in the right superior mediastinum, likely subclavian vessel.                                    X-Rays:   Independently Interpreted Readings:   Other Readings:    X-Ray Chest AP  Performed: 10/11/24 8218  Final           Impression: Mild interstitial edema increased from prior exam otherwise stable chest.           Medications   albuterol-ipratropium 2.5 mg-0.5 mg/3 mL nebulizer solution 3 mL (3 mLs Nebulization Given 10/11/24 5987)   budesonide nebulizer solution 0.5 mg (has no administration in time range)   atorvastatin tablet 80 mg (has no administration in time range)   carvediloL tablet 6.25 mg (has no administration in time range)   cyproheptadine 4 mg tablet 4 mg (has no administration in time range)   hydrALAZINE tablet 100 mg (has no administration in time range)   montelukast tablet 10 mg (has no administration in time range)   roflumilast (DALIRESP) tablet 500 mcg (has no administration in time range)   sevelamer carbonate tablet 800 mg (has no administration in time range)   ticagrelor tablet 90 mg (90 mg Oral Given 10/11/24 3838)   aspirin EC tablet 81 mg (has  no administration in time range)   methylPREDNISolone sodium succinate injection 40 mg (40 mg Intravenous Given 10/11/24 8768)   levoFLOXacin 500 mg/100 mL IVPB 500 mg (500 mg Intravenous New Bag 10/12/24 0148)   levoFLOXacin 250 mg/50 mL IVPB 250 mg (has no administration in time range)   sodium chloride 0.9% flush 10 mL (has no administration in time range)   naloxone 0.4 mg/mL injection 0.02 mg (has no administration in time range)   heparin (porcine) injection 5,000 Units (5,000 Units Subcutaneous Given 10/12/24 0154)   ondansetron injection 4 mg (has no administration in time range)   prochlorperazine injection Soln 5 mg (has no administration in time range)   albuterol-ipratropium 2.5 mg-0.5 mg/3 mL nebulizer solution 3 mL (3 mLs Nebulization Given 10/11/24 1929)   methylPREDNISolone sodium succinate injection 125 mg (125 mg Intravenous Given 10/11/24 2017)     Medical Decision Making            Attending Attestation:           Physician Attestation for Scribe:  Physician Attestation Statement for Scribe #1: I, José Washington,DO, reviewed documentation, as scribed by Hoang Pena in my presence, and it is both accurate and complete.             ED Course as of 10/12/24 0218   Fri Oct 11, 2024   1715   X-Ray Chest AP  Performed: 10/11/24 1648  Final           Impression: Mild interstitial edema increased from prior exam otherwise stable chest.        [CH]   1800 Sign out.  Pulled 2.5 L off at dialysis.  Became anxious.  No chest pain.  SOB.  Before during and after dialysis.  Comfortable now.  On 2L here [PK]   1933 Reviewed external records recent admission for possible pneumonia.  PHYSICIAN/ADMISSION INFORMATION:  Admit Date: 9/26/2024   Admitting Physician: Diana Alba MD   Attending Physician: Kingston Matthew MD  Admission Diagnoses: Pneumonia [J18.9]  End-stage renal disease on hemodialysis (HCC) [N18.6, Z99.2]  Chronic respiratory failure with hypoxia (HCC) [J96.11]  Hypotension,  unspecified hypotension type [I95.9]  Anemia, unspecified type [D64.9]  Primary Care Physician: No primary care provider on file.             Primary Care Phone: None        DISCHARGE INFORMATION:  Discharge Date/Time: No discharge date for patient encounter.  Discharge Physician: Dr. Matthew  Discharge Diagnoses:  Pneumonia Gram-negative species, acute on chronic blood-loss anemia, COPD exacerbation, chronic O2 dependency, end-stage renal disease on hemodialysis.  Discharged Condition: stable                HISTORY OF PRESENT ILLNESS:   Marisol is a 74 y.o. male who was initially admitted to the hospital on 9/26/2024 further evaluation and treatment dyspnea and anemia.  This patient has a past medical history of end-stage renal disease on hemodialysis, hypertension, hyperlipidemia, coronary artery disease, iron-deficiency anemia and COPD.  On the day of admit patient was sent from [facility] because he began to experience worsening shortness of breath.  While in the emergency department patient was found to have a low-grade temperature of 99°.  He was tachycardic with oxygen saturations ranging between %. CT of the chest ruled out large pulmonary emboli; however, small segmental emboli can not be excluded. Study was limited due to respiratory motion. He was found to have patchy opacities in bilateral lungs suspicious for pneumonia.  Because of these findings patient was admitted to the hospital and started on broad-spectrum antibiotic therapy and followed by Nephrology.  He did require 2 units of RBCs due to anemia.    [PK]   2239 Discussed with the hospitalist about need to  hospitalize observation status for COPD exacerbation.  Patient treated with Solu-Medrol and DuoNebs.  Also has end-stage renal disease but was recently dialyzed.  Patient has been short of breath before during and after dialysis and also he has had some metabolic encephalopathy with some mild confusion.  Remains to be mildly  tachycardic around 115 sinus rhythm.  Arterial blood gas does not show hypercapnia.  Patient breathing well with 2 L oxygen when she also wears at home.  Awake alert oriented to person and place but with confused speech and generalized weakness.  Troponin is elevated chronically in the setting of end-stage renal disease.  Not significantly elevated today.  Second troponin pending.  No acute EKG changes.  CT head shows no acute abnormality. [PK]      ED Course User Index  [CH] Hoang Pena  [PK] Pedro Liao MD                             Clinical Impression:  Final diagnoses:  [R06.02] SOB (shortness of breath)  [G93.41] Metabolic encephalopathy (Primary)  [J44.1] Acute exacerbation of chronic obstructive pulmonary disease (COPD)          ED Disposition Condition    Admit                 Pedro Liao MD  10/12/24 0218

## 2024-10-12 PROBLEM — R53.81 DEBILITY: Status: ACTIVE | Noted: 2024-10-12

## 2024-10-12 PROBLEM — I95.0 IDIOPATHIC HYPOTENSION: Status: ACTIVE | Noted: 2024-10-12

## 2024-10-12 PROBLEM — G93.41 ACUTE METABOLIC ENCEPHALOPATHY: Status: ACTIVE | Noted: 2024-10-12

## 2024-10-12 PROBLEM — F17.200 TOBACCO DEPENDENCY: Status: ACTIVE | Noted: 2024-10-12

## 2024-10-12 LAB
ALBUMIN SERPL BCP-MCNC: 2 G/DL (ref 3.5–5)
ANION GAP SERPL CALCULATED.3IONS-SCNC: 8 MMOL/L (ref 7–16)
ANISOCYTOSIS BLD QL SMEAR: ABNORMAL
BASOPHILS # BLD AUTO: 0.02 K/UL (ref 0–0.2)
BASOPHILS NFR BLD AUTO: 0.3 % (ref 0–1)
BUN SERPL-MCNC: 24 MG/DL (ref 7–18)
BUN/CREAT SERPL: 6 (ref 6–20)
CALCIUM SERPL-MCNC: 8.6 MG/DL (ref 8.5–10.1)
CHLORIDE SERPL-SCNC: 103 MMOL/L (ref 98–107)
CO2 SERPL-SCNC: 28 MMOL/L (ref 21–32)
CREAT SERPL-MCNC: 4.11 MG/DL (ref 0.7–1.3)
DIFFERENTIAL METHOD BLD: ABNORMAL
EGFR (NO RACE VARIABLE) (RUSH/TITUS): 14 ML/MIN/1.73M2
EOSINOPHIL # BLD AUTO: 0 K/UL (ref 0–0.5)
EOSINOPHIL NFR BLD AUTO: 0 % (ref 1–4)
ERYTHROCYTE [DISTWIDTH] IN BLOOD BY AUTOMATED COUNT: 18 % (ref 11.5–14.5)
FOLATE SERPL-MCNC: 7.2 NG/ML (ref 3.1–17.5)
GLUCOSE SERPL-MCNC: 112 MG/DL (ref 74–106)
HAV IGM SER QL: NORMAL
HBV CORE IGM SER QL: NORMAL
HBV SURFACE AG SERPL QL IA: NORMAL
HCT VFR BLD AUTO: 30.5 % (ref 40–54)
HCV AB SER QL: NORMAL
HGB BLD-MCNC: 9.2 G/DL (ref 13.5–18)
IMM GRANULOCYTES # BLD AUTO: 0.51 K/UL (ref 0–0.04)
IMM GRANULOCYTES NFR BLD: 6.7 % (ref 0–0.4)
LYMPHOCYTES # BLD AUTO: 0.42 K/UL (ref 1–4.8)
LYMPHOCYTES NFR BLD AUTO: 5.5 % (ref 27–41)
LYMPHOCYTES NFR BLD MANUAL: 10 % (ref 27–41)
MCH RBC QN AUTO: 29.2 PG (ref 27–31)
MCHC RBC AUTO-ENTMCNC: 30.2 G/DL (ref 32–36)
MCV RBC AUTO: 96.8 FL (ref 80–96)
MONOCYTES # BLD AUTO: 0.28 K/UL (ref 0–0.8)
MONOCYTES NFR BLD AUTO: 3.7 % (ref 2–6)
MONOCYTES NFR BLD MANUAL: 3 % (ref 2–6)
MPC BLD CALC-MCNC: 10 FL (ref 9.4–12.4)
NEUTROPHILS # BLD AUTO: 6.41 K/UL (ref 1.8–7.7)
NEUTROPHILS NFR BLD AUTO: 83.8 % (ref 53–65)
NEUTS BAND NFR BLD MANUAL: 8 % (ref 1–5)
NEUTS SEG NFR BLD MANUAL: 79 % (ref 50–62)
NRBC # BLD AUTO: 0.04 X10E3/UL
NRBC, AUTO (.00): 0.5 %
NT-PROBNP SERPL-MCNC: ABNORMAL PG/ML (ref 1–450)
PHOSPHATE SERPL-MCNC: 3.9 MG/DL (ref 2.5–4.5)
PLATELET # BLD AUTO: 234 K/UL (ref 150–400)
PLATELET MORPHOLOGY: ABNORMAL
POTASSIUM SERPL-SCNC: 4.1 MMOL/L (ref 3.5–5.1)
RBC # BLD AUTO: 3.15 M/UL (ref 4.6–6.2)
SODIUM SERPL-SCNC: 135 MMOL/L (ref 136–145)
TROPONIN I SERPL DL<=0.01 NG/ML-MCNC: 175.7 PG/ML
TSH SERPL DL<=0.005 MIU/L-ACNC: 0.83 UIU/ML (ref 0.36–3.74)
VIT B12 SERPL-MCNC: 1025 PG/ML (ref 193–986)
WBC # BLD AUTO: 7.64 K/UL (ref 4.5–11)

## 2024-10-12 PROCEDURE — 90935 HEMODIALYSIS ONE EVALUATION: CPT

## 2024-10-12 PROCEDURE — 25000242 PHARM REV CODE 250 ALT 637 W/ HCPCS: Performed by: INTERNAL MEDICINE

## 2024-10-12 PROCEDURE — S4991 NICOTINE PATCH NONLEGEND: HCPCS | Performed by: INTERNAL MEDICINE

## 2024-10-12 PROCEDURE — 80074 ACUTE HEPATITIS PANEL: CPT | Performed by: INTERNAL MEDICINE

## 2024-10-12 PROCEDURE — 99233 SBSQ HOSP IP/OBS HIGH 50: CPT | Mod: ,,, | Performed by: INTERNAL MEDICINE

## 2024-10-12 PROCEDURE — 82746 ASSAY OF FOLIC ACID SERUM: CPT | Performed by: INTERNAL MEDICINE

## 2024-10-12 PROCEDURE — 36415 COLL VENOUS BLD VENIPUNCTURE: CPT | Performed by: INTERNAL MEDICINE

## 2024-10-12 PROCEDURE — 84443 ASSAY THYROID STIM HORMONE: CPT | Performed by: INTERNAL MEDICINE

## 2024-10-12 PROCEDURE — 5A1D70Z PERFORMANCE OF URINARY FILTRATION, INTERMITTENT, LESS THAN 6 HOURS PER DAY: ICD-10-PCS | Performed by: INTERNAL MEDICINE

## 2024-10-12 PROCEDURE — 25000003 PHARM REV CODE 250

## 2024-10-12 PROCEDURE — 85025 COMPLETE CBC W/AUTO DIFF WBC: CPT | Performed by: INTERNAL MEDICINE

## 2024-10-12 PROCEDURE — 27000221 HC OXYGEN, UP TO 24 HOURS

## 2024-10-12 PROCEDURE — 63600175 PHARM REV CODE 636 W HCPCS: Performed by: INTERNAL MEDICINE

## 2024-10-12 PROCEDURE — 94640 AIRWAY INHALATION TREATMENT: CPT

## 2024-10-12 PROCEDURE — 87040 BLOOD CULTURE FOR BACTERIA: CPT | Performed by: INTERNAL MEDICINE

## 2024-10-12 PROCEDURE — 80069 RENAL FUNCTION PANEL: CPT | Performed by: INTERNAL MEDICINE

## 2024-10-12 PROCEDURE — 25000003 PHARM REV CODE 250: Performed by: INTERNAL MEDICINE

## 2024-10-12 PROCEDURE — 99900035 HC TECH TIME PER 15 MIN (STAT)

## 2024-10-12 PROCEDURE — 94761 N-INVAS EAR/PLS OXIMETRY MLT: CPT

## 2024-10-12 PROCEDURE — 84484 ASSAY OF TROPONIN QUANT: CPT | Performed by: INTERNAL MEDICINE

## 2024-10-12 PROCEDURE — 11000001 HC ACUTE MED/SURG PRIVATE ROOM

## 2024-10-12 RX ORDER — ACETAMINOPHEN 325 MG/1
650 TABLET ORAL EVERY 6 HOURS PRN
Status: DISCONTINUED | OUTPATIENT
Start: 2024-10-12 | End: 2024-10-17 | Stop reason: HOSPADM

## 2024-10-12 RX ORDER — IBUPROFEN 200 MG
1 TABLET ORAL DAILY
Status: DISCONTINUED | OUTPATIENT
Start: 2024-10-12 | End: 2024-10-17 | Stop reason: HOSPADM

## 2024-10-12 RX ORDER — DOCUSATE SODIUM 100 MG/1
100 CAPSULE, LIQUID FILLED ORAL 2 TIMES DAILY
Status: ON HOLD | COMMUNITY
Start: 2024-10-04 | End: 2024-11-03

## 2024-10-12 RX ORDER — SODIUM CHLORIDE 9 MG/ML
INJECTION, SOLUTION INTRAVENOUS
Status: COMPLETED
Start: 2024-10-12 | End: 2024-10-12

## 2024-10-12 RX ORDER — MUPIROCIN 20 MG/G
OINTMENT TOPICAL 2 TIMES DAILY
Status: DISCONTINUED | OUTPATIENT
Start: 2024-10-12 | End: 2024-10-17 | Stop reason: HOSPADM

## 2024-10-12 RX ORDER — SODIUM CHLORIDE 9 MG/ML
INJECTION, SOLUTION INTRAVENOUS
Status: DISCONTINUED | OUTPATIENT
Start: 2024-10-12 | End: 2024-10-17 | Stop reason: HOSPADM

## 2024-10-12 RX ADMIN — ASPIRIN 81 MG: 81 TABLET, COATED ORAL at 08:10

## 2024-10-12 RX ADMIN — BUDESONIDE 0.5 MG: 0.5 INHALANT RESPIRATORY (INHALATION) at 07:10

## 2024-10-12 RX ADMIN — TICAGRELOR 90 MG: 90 TABLET ORAL at 08:10

## 2024-10-12 RX ADMIN — SEVELAMER CARBONATE 800 MG: 800 TABLET, FILM COATED ORAL at 08:10

## 2024-10-12 RX ADMIN — CYPROHEPTADINE HYDROCHLORIDE 4 MG: 4 TABLET ORAL at 08:10

## 2024-10-12 RX ADMIN — IPRATROPIUM BROMIDE AND ALBUTEROL SULFATE 3 ML: 2.5; .5 SOLUTION RESPIRATORY (INHALATION) at 07:10

## 2024-10-12 RX ADMIN — HEPARIN SODIUM 5000 UNITS: 5000 INJECTION, SOLUTION INTRAVENOUS; SUBCUTANEOUS at 01:10

## 2024-10-12 RX ADMIN — ATORVASTATIN CALCIUM 80 MG: 80 TABLET, FILM COATED ORAL at 08:10

## 2024-10-12 RX ADMIN — METHYLPREDNISOLONE SODIUM SUCCINATE 40 MG: 40 INJECTION, POWDER, FOR SOLUTION INTRAMUSCULAR; INTRAVENOUS at 05:10

## 2024-10-12 RX ADMIN — ACETAMINOPHEN 650 MG: 325 TABLET ORAL at 11:10

## 2024-10-12 RX ADMIN — METHYLPREDNISOLONE SODIUM SUCCINATE 40 MG: 40 INJECTION, POWDER, FOR SOLUTION INTRAMUSCULAR; INTRAVENOUS at 02:10

## 2024-10-12 RX ADMIN — MUPIROCIN: 20 OINTMENT TOPICAL at 02:10

## 2024-10-12 RX ADMIN — HEPARIN SODIUM 5000 UNITS: 5000 INJECTION, SOLUTION INTRAVENOUS; SUBCUTANEOUS at 08:10

## 2024-10-12 RX ADMIN — MUPIROCIN: 20 OINTMENT TOPICAL at 08:10

## 2024-10-12 RX ADMIN — NICOTINE 1 PATCH: 14 PATCH, EXTENDED RELEASE TRANSDERMAL at 02:10

## 2024-10-12 RX ADMIN — HYDRALAZINE HYDROCHLORIDE 100 MG: 100 TABLET ORAL at 08:10

## 2024-10-12 RX ADMIN — CARVEDILOL 6.25 MG: 6.25 TABLET, FILM COATED ORAL at 08:10

## 2024-10-12 RX ADMIN — SODIUM CHLORIDE: 9 INJECTION, SOLUTION INTRAVENOUS at 11:10

## 2024-10-12 RX ADMIN — IPRATROPIUM BROMIDE AND ALBUTEROL SULFATE 3 ML: 2.5; .5 SOLUTION RESPIRATORY (INHALATION) at 12:10

## 2024-10-12 RX ADMIN — METHYLPREDNISOLONE SODIUM SUCCINATE 40 MG: 40 INJECTION, POWDER, FOR SOLUTION INTRAMUSCULAR; INTRAVENOUS at 11:10

## 2024-10-12 RX ADMIN — LEVOFLOXACIN 500 MG: 500 INJECTION, SOLUTION INTRAVENOUS at 01:10

## 2024-10-12 RX ADMIN — SEVELAMER CARBONATE 800 MG: 800 TABLET, FILM COATED ORAL at 02:10

## 2024-10-12 RX ADMIN — SEVELAMER CARBONATE 800 MG: 800 TABLET, FILM COATED ORAL at 05:10

## 2024-10-12 RX ADMIN — ROFLUMILAST 500 MCG: 500 TABLET ORAL at 02:10

## 2024-10-12 NOTE — ASSESSMENT & PLAN NOTE
"  Patient has Combined Systolic and Diastolic heart failure that is Acute on chronic. On presentation their CHF was decompensated. Evidence of decompensated CHF on presentation includes: crackles on lung auscultation and shortness of breath. The etiology of their decompensation is likely dietary indiscretion. Most recent BNP and echo results are listed below.  No results for input(s): "BNP" in the last 72 hours.  Latest ECHO  Results for orders placed during the hospital encounter of 08/25/24    Echo    Interpretation Summary    Left Ventricle: The left ventricle is smaller than normal. There is severe concentric hypertrophy. Regional wall motion abnormalities present.  Inferior wall hypokinesis is noted There is mildly reduced systolic function with a visually estimated ejection fraction of 40 - 50%. Ejection fraction by visual approximation is 45%. There is diastolic dysfunction.    Right Ventricle: Normal right ventricular cavity size.    Aortic Valve: The aortic valve is a trileaflet valve. Mildly calcified cusps.    Tricuspid Valve: There is mild regurgitation. There is mild pulmonary hypertension.    IVC/SVC: Normal venous pressure at 3 mmHg.    Pericardium: There is a trivial effusion.    Current Heart Failure Medications  carvediloL tablet 6.25 mg, 2 times daily, Oral  hydrALAZINE tablet 100 mg, 3 times daily, Oral    Plan  - Monitor strict I&Os and daily weights.    - Place on telemetry  - Low sodium diet  - Place on fluid restriction of 1 L.   - Nephrology will be consulted for hypervolemia  "

## 2024-10-12 NOTE — ASSESSMENT & PLAN NOTE
Patient with known CAD s/p stent placement which is controlled Will continue ASA, Brillinta, and Statin and monitor for S/Sx of angina/ACS. Continue to monitor on telemetry.

## 2024-10-12 NOTE — PLAN OF CARE
"[facility]  Initial [facility]charge Assessment       Primary Care Provider: No, Primary Doctor    Admission Diagnosis: Metabolic encephalopathy [G93.41]  SOB (shortness of breath) [R06.02]  Acute exacerbation of chronic obstructive pulmonary disease (COPD) [J44.1]  Chest pain [R07.9]  Chronic obstructive pulmonary disease with acute exacerbation [J44.1]    Admission Date: 10/11/2024  Expected Discharge Date: 10/15/2024    Transition of Care Barriers: (P) None    Payor: MEDICARE / Plan: MEDICARE PART A & B / Product Type: Government /     Extended Emergency Contact Information  Primary Emergency Contact: Barb Damon  Mobile Phone: [phone]  Relation: Daughter  Preferred language: English   needed? No    Discharge Plan A: (P) Return to nursing home  Discharge Plan B: (P) Return to Nursing Home      [facility] [place], [place] - [address] [place] [address]  [address] [place] [address]  Phone: [phone] Fax: [phone]    [facility] [address] [address]  Phone: [phone] Fax: [phone]      Initial Assessment (most recent)       Adult Discharge Assessment - 10/12/24 1114          Discharge Assessment    Assessment Type Discharge Planning Assessment     Confirmed/corrected address, phone number and insurance Yes     Confirmed Demographics Correct on Facesheet     Source of Information family     If unable to respond/provide information was family/caregiver contacted? Yes     Contact Name/Number Barb Damon (Daughter) [phone]     Communicated MARCIAL with patient/caregiver Date not available/Unable to determine     Reason For Admission Daughter states her father was experiencing "shortness of breath at dialysis." (P)      People in Home facility resident (P)      Facility Arrived From: [facility] (P)      Do you expect to return to your current living situation? Yes (P)      Do " you have help at home or someone to help you manage your care at home? -- (P)    Pt is a NH resident at [facility]    Prior to hospitilization cognitive status: Unable to Assess (P)      Current cognitive status: Unable to Assess (P)      Walking or Climbing Stairs Difficulty yes (P)      Walking or Climbing Stairs ambulation difficulty, dependent;transferring difficulty, dependent (P)      Mobility Management Wheelchair (P)      Dressing/Bathing Difficulty yes (P)      Dressing/Bathing bathing difficulty, dependent;dressing difficulty, dependent (P)      Do you have any problems with: -- (P)    No problems reported by daughter    Home Accessibility wheelchair accessible (P)      Home Layout Able to live on 1st floor (P)      Equipment Currently Used at Home wheelchair;hospital bed;oxygen;CPAP;shower chair (P)      Readmission within 30 days? No (P)      Patient currently being followed by outpatient case management? No (P)      Do you currently have service(s) that help you manage your care at home? No (P)      Do you take prescription medications? Yes (P)      Do you have prescription coverage? Yes (P)      Coverage Medicare (P)      Do you have any problems affording any of your prescribed medications? No (P)      Is the patient taking medications as prescribed? yes (P)      Who is going to help you get home at discharge? Ambulance (P)      How do you get to doctors appointments? agency (P)      Are you on dialysis? Yes (P)      Dialysis Name and Scheduled days [facility]; M-W-F (P)      Do you take coumadin? No (P)      Discharge Plan A Return to nursing home (P)      Discharge Plan B Return to Nursing Home (P)      Discharge Plan discussed with: Adult children (P)      Transition of Care Barriers None (P)                  SHIVAM spoke with pt's daughter, Barb, to obtain information for pt's initial discharge assessment.  Pt is a resident at [facility] and discharge plans are to return back to [facility] when medically  ready.  Choice obtained for [facility] and SW verified with RUSTY Paul, that pt is able to return. Pt has a wheelchair, hospital bed, CPAP, oxygen and a shower chair for use.  Pt receives dialysis at [facility] and his scheduled days are M-W-F.  SDOH not completed as pt is a NH resident.  IM obtained.  SS will compile packet on Monday. SS following.

## 2024-10-12 NOTE — HPI
Patient is a 74-year-old male with a history of chronic combined heart failure with last known EF of 45% in the setting of a known CAD status post PCI with stent placement of LAD and Lcx, essential hypertension, end-stage renal disease on hemodialysis on Monday Wednesdays and Fridays and oxygen-dependent COPD who presented to the emergency room with a chief complaint of shortness of breath accompanied by nonproductive cough and wheezing. Patient stated that shortness breath started doing dialysis session today.  Patient otherwise denied any associated symptoms such as fever, chills, chest pain palpitation PND orthopnea or lower extremity edema.     On initial presentation patient was tachycardic but vital signs were otherwise stable and patient was afebrile.  Workup was notable for presence of mild interstitial edema seen on chest x-ray in the setting of a mildly elevated troponin levels 171-->191 without any ischemic abnormalities seen on EKG, macrocytic anemia with a hemoglobin in the 10 g range which is his baseline, but otherwise unremarkable for someone with end-stage renal disease on HD.  Physical exam was notable for a presence of diffuse rhonchi with wheezing but otherwise unremarkable without any peripheral edema.  Patient will be admitted with a working diagnosis of acute on chronic combined heart failure along with acute exacerbation of COPD secondary to the former

## 2024-10-12 NOTE — HPI
74-year-old man with ESRD, hypertension, and severe COPD.  He presented with shortness of breath which worsened last p.m..  No chest pain.  No nausea

## 2024-10-12 NOTE — ASSESSMENT & PLAN NOTE
On M/W/F dilaysis, missed session on date of admission as he got ill/short of breath (10/11/24).   Nephrology consulted to resume dialysis.

## 2024-10-12 NOTE — ASSESSMENT & PLAN NOTE
"Patient has Combined Systolic and Diastolic heart failure that is Acute on chronic. On presentation their CHF was decompensated. Evidence of decompensated CHF on presentation includes: crackles on lung auscultation and shortness of breath. The etiology of their decompensation is likely dietary indiscretion. Most recent BNP and echo results are listed below.  No results for input(s): "BNP" in the last 72 hours.  Latest ECHO  Results for orders placed during the hospital encounter of 08/25/24    Echo    Interpretation Summary    Left Ventricle: The left ventricle is smaller than normal. There is severe concentric hypertrophy. Regional wall motion abnormalities present.  Inferior wall hypokinesis is noted There is mildly reduced systolic function with a visually estimated ejection fraction of 40 - 50%. Ejection fraction by visual approximation is 45%. There is diastolic dysfunction.    Right Ventricle: Normal right ventricular cavity size.    Aortic Valve: The aortic valve is a trileaflet valve. Mildly calcified cusps.    Tricuspid Valve: There is mild regurgitation. There is mild pulmonary hypertension.    IVC/SVC: Normal venous pressure at 3 mmHg.    Pericardium: There is a trivial effusion.    Current Heart Failure Medications  carvediloL tablet 6.25 mg, 2 times daily, Oral    Plan  - Monitor strict I&Os and daily weights.    - Place on telemetry  - Low sodium diet  - Place on fluid restriction of 1 L.   - Volume removal with dialysis.   "

## 2024-10-12 NOTE — NURSING
[facility] INPATIENT SERVICES  DIALYSIS TREATMENT SUMMARY      Note: Consult with the attending physician for patient treatment orders, this document is not a physician order.      Patient Information   Patient Michoacano Damon   Date of Birth July 07, 1950   Chart Number [id number]   Location [facility]   Location MRN [id number]   Gender Male   SSN (last 4) [id number]     Treatment Information   Treatment Type Hemodialysis   Treatment Id [id number]   Start Time October 12, 2024 10:05   End Time October 12, 2024 13:05   Acutal Duration 03:00     Treatment Balances   Total Saline Administered 500   Net Fluid Balance 1700    Hemodialysis Orders   Therapy Standard   Orders Verified Time 10/12/2024 09:30    Date Verified 10/12/2024   Duration 3:00   Isolated UF/Bypass No   BFR (mL) 400   DFR (mL) 600   Dialyzer Type OPTIFLUX 160NR   UF Order UF Goal Ordered   UF Goal Ordered (mL) 2000   Crit-Line used No   Heparin Initial Units Bolus No   Heparin IV Maintenance Bolus No   Heparin IV Infusion No   Potassium (mEq/L) 3   Calcium (mEq/L) 2.5   Bicarb (mg/dL) 33   Sodium (mEq/L) 137   Additional Orders 1. Turn UF off if pt becomes symptomatic associated with drop in SBP >20mmHg 2. Hepatitis panel   Clinician Minnie Bruner RN    Dialysis Access   Access Type AV Access   AV Access   Access Location Forearm - R   Needle 15g   Bruit Yes   Thrill Yes      Vitals   Pre-Treatment Vitals   Time Is BP being recorded? Pre BP Map BP Method Pulse RR Temp How was Weight Obtained? Pre Weight Previous Dry Weight Previous Post Weight Metric Target Fluid Removal (mL) Dialysate Confirmed Clinician    10/12/2024 10:00 BP/Map 99/50 (66) Noninvasive 97 17 97.3 How Obtained: Bed Scale 65.4   Kgs 3000 Yes Minnie Bruner, TEDDY    Comments:       Post Treatment Vitals   Time Is BP being recorded? BP Map Pulse RR Temp How was Weight Obtained? Post Weight Metric BVP UF Goal Ordered NSS Given Intra-Procedure Total Machine UF Removed (mL) Crit-Line Ending  Profile Crit-Line Refill Crit-Line Ending HCT Crit-Line Max BV% Clinician    10/12/2024 13:07 BP/Map 101/47 (65) 111 19 98 How Obtained: Bed scale 64.5 Kgs 63 2000 0 2200     Minnie Bruner RN    Comments: vitals stable      Safety checks include: access uncovered and secured, Hemaclip secured for all central line access, machine checks performed, and alarm limits confirmed.     Labs   Hepatitis   HBsAG Lab Result HBsAG Lab Result HBsAG Draw Date Transient Antigenemia(MD Diagnosis Only) Anti-HBs Lab Result Anti-HBs Lab Value Anti-HBs Draw Date Documented By Documented Date Hepatitis Status Hepatitis Status   Negative  10/12/2024  Unknown  10/12/2024 Minnie Bruner 10/12/2024  Susceptible   Notes:       Pre-Treatment Hepatitis Precautions Copy of hepatitis results verified in hospital EMR Yes Signing   Patient tx outside buffer zone Yes Hepatitis Information Entered By Minnie Bruner RN Signed By Minnie Bruner RN     Pre-Treatment Handoff   Staff Report Received Yes   Report Given by Primary Nurse Pedro Pedersen   Time 09:53     Patient Arrival   Patient ID Verified Date of Birth    MRN    Full Name   Patient Consent to treatment verified Yes   Blood Transfusion Consent Verified N/A     Treatment Comments   Treatment Notes pt tolerated HD well. vitals stable. afebrile. needles removed without difficulty. manual pressure held until hemostasis obtained. bruit and thrill noted.     Post-Treatment Handoff   Report Given to Primary Nurse Jayshree Marroquin   Time Report Given 13:22   Report Given By Minnie Bruner RN    Machine Validation   Time 09:50   Date 10/12/2024   Auto Alarm Test Passed Yes   Machine Serial # [id number]   Portable RO Yes   RO Serial# [id number]   Residual Bleach Negative Yes   Was a manufactured mix used? Yes   Machine Log Completed Yes   Total Chlorine (less than 0.1)? Yes   Total Chlorine Log Completed Yes   Bicarb BiBag   Bibag Size 650   Machine Temp 37   Machine Conductivity 14   Meter  Type N/A   Meter Conductivity    Independent Conductivity 13.8   pH Status Pass Pass   pH    TCD Value 13.7   TCD Alarm with +/- 0.5 Yes   NVL enabled validated 100 asymmetric Yes   Safety check complete Minnie Bruner RN   Second Verification Performed? No   Second Verification Performed By    Reason Not Verified No other staff member - unable to leave the patient      Serum Lab Values   Time BUN Creatinine Na K (mEq/L) Cl CO2 Ca (mEq/L) Phos Mg (mg/dL) Alb (g/dL) Glucose Hgb Hct WBC Plt PT aPTT INR Other Clinician    10/12/2024 04:20 24 4.11 135 4.1 103 28 8.6 3.9  2 112 9.2 30.5 7.64 234     Minnie Bruner RN    Comments:         Facility Information Location [facility]   Facility Information Room # 568 Diagnosis CHRONIC OBSTRUCTIVE PULMONARY DISEASE WITH ACUTE EXACERBATION   Admission Date 10/11/2024 Patient Type Chronic dialysis patient with diagnosis of ESRD Isolation Information   Ordering MD Dr. Smooth Herrera Patient Chronic Unit [facility] Isolation Required? N   Account/Finance Number [id number] Patient Home Unit [facility] Completed by   Admission Status InPatient Code Status Full Code General Tx information Entered by Minnie Bruner RN     Start Treatment Time Out Completed 10:03 Treatment Start Date 10/12/2024   Treatment Initiation Connections Secured Correct patient verified Yes Treatment Start Time 10:05    Saline line double clamped Correct procedure verified Yes Patient/Family questions and concerns addressed Yes   Time Out Confirmed by AZALIA Bruner RN Correct access site verified Yes      Pre Focused Assessment Notes 3LNC LOC Alert and Oriented x3   Access Edema GI / Bowels   Signs and Symptoms of Infection? No Location Generalized GI Soft   Pain Screening Cardiac    Does the patient have pain? No Heart Rhythm Regular  Anuric   Respiratory Telemetry No Completed by   Lung Sounds Diminished Skin Pre Treatment Focused Assessment Completed By Minnie Bruner RN    Location Bases Skin Dry Time 09:55   Position Anterior  Warm Signing   Respiratory Efforts Labored LOC Signed By Minnie Bruner RN     Education  Treatment Options Patient Education Introduced By   Patient Education Method Knowledge / Understanding Assessed Teach back Patient Education Introduced By Minnie Bruner RN   Patient Educated? Yes Family Education Provided? N/A    Focus or Topic Access Maintenance Caregiver Education Provided? N/A      Post-Treatment HD machine external disinfection completed per policy Yes Completed by   Post Treatment Delay PRO external disinfection completed per policy Yes Post Treatment Form Completed By Minnie Bruner RN   Delay N Post Treatment General Information    Machine Disinfection Requirement Notes vitals stable    Post Focused Assessment Location Bases LOC   Changes from Pre Focused Assessment Position Anterior LOC Alert and Oriented x3   Changes from Pre Treatment Focused Assessment? No Respiratory Efforts Labored GI / Bowels   Access Notes 3LNC GI Soft   Bruit Yes Edema    Thrill Yes Location Generalized  Anuric   Access Flow Good Cardiac Completed by   Dialyzer Clearance Streaked Heart Rhythm Regular Post Treatment Focused Assessment Completed by Minnie Bruner RN   Pain Screening Telemetry No Date 10/12/2024   Does the patient have pain? No Skin Time 13:10   Respiratory Skin Warm Signing   Lung Sounds Diminished  Dry Signed By Minnie Bruner, RN

## 2024-10-12 NOTE — ASSESSMENT & PLAN NOTE
\  Anemia is likely due to chronic disease due to ESRD. Most recent hemoglobin and hematocrit are listed below.    Recent Labs     10/11/24  1705 10/11/24  1706   HGB 10.6*  --    HCT 36.3* 33*     Plan  - Monitor serial CBC: Daily  - Transfuse PRBC if patient becomes hemodynamically unstable, symptomatic or H/H drops below 7/21.  - Patient has not received any PRBC transfusions to date

## 2024-10-12 NOTE — SUBJECTIVE & OBJECTIVE
Interval History: Patient seen and examined at the bedside, reports breathing better, appears confused but redirectable.     Review of Systems   Respiratory:  Positive for shortness of breath.    All other systems reviewed and are negative.    Objective:     Vital Signs (Most Recent):  Temp: 98.6 °F (37 °C) (10/12/24 0708)  Pulse: 104 (10/12/24 1100)  Resp: 17 (10/12/24 1100)  BP: (!) 92/52 (10/12/24 1100)  SpO2: 99 % (10/12/24 0717) Vital Signs (24h Range):  Temp:  [97.9 °F (36.6 °C)-98.6 °F (37 °C)] 98.6 °F (37 °C)  Pulse:  [] 104  Resp:  [12-22] 17  SpO2:  [95 %-100 %] 99 %  BP: ()/(47-78) 92/52     Weight: 71.7 kg (158 lb)  Body mass index is 22.04 kg/m².  No intake or output data in the 24 hours ending 10/12/24 1119      Physical Exam  Vitals and nursing note reviewed.   Constitutional:       General: He is awake.      Appearance: He is ill-appearing.   HENT:      Head: Normocephalic and atraumatic.      Nose: Nose normal.      Mouth/Throat:      Mouth: Mucous membranes are moist.   Eyes:      Extraocular Movements: Extraocular movements intact.   Cardiovascular:      Rate and Rhythm: Normal rate and regular rhythm.      Pulses: Normal pulses.      Heart sounds: Normal heart sounds.   Pulmonary:      Effort: Pulmonary effort is normal.      Breath sounds: Wheezing present.   Abdominal:      General: Bowel sounds are normal.      Palpations: Abdomen is soft.   Musculoskeletal:         General: Normal range of motion.      Cervical back: Normal range of motion.      Right lower leg: Edema present.      Left lower leg: Edema present.   Skin:     General: Skin is warm.   Neurological:      General: No focal deficit present.      Mental Status: He is alert. Mental status is at baseline. He is disoriented.   Psychiatric:         Mood and Affect: Mood normal.         Behavior: Behavior normal.             Significant Labs: All pertinent labs within the past 24 hours have been reviewed.    Significant  Imaging: I have reviewed all pertinent imaging results/findings within the past 24 hours.

## 2024-10-12 NOTE — ASSESSMENT & PLAN NOTE
Will continue home antihypertensive with the exception of decreasing beta-blocker to half of its original dose given CHF decompensation

## 2024-10-12 NOTE — CONSULTS
[facility] - [address] [place] [address]  Nephrology  Consult Note    Patient Name: Michoacano Damon  MRN: [id number]  Admission Date: 10/11/2024  Hospital Length of Stay: 1 days  Attending Provider: Joel Sellers MD   Primary Care Physician: Norma, Primary Doctor  Principal Problem:Chronic obstructive pulmonary disease with acute exacerbation    Consults  Subjective:     HPI: 74-year-old man with ESRD, hypertension, and severe COPD.  He presented with shortness of breath which worsened last p.m..  No chest pain.  No nausea    Past Medical History:   Diagnosis Date    Anticoagulant long-term use     COPD (chronic obstructive pulmonary disease)     Coronary artery disease     ESRD on dialysis     HFrEF (heart failure with reduced ejection fraction) 2021    Hypertension        Past Surgical History:   Procedure Laterality Date    ANGIOGRAM, CORONARY, WITH LEFT HEART CATHETERIZATION N/A 8/27/2024    Procedure: Angiogram, Coronary, with Left Heart Cath;  Surgeon: Oziel Santos MD;  Location: [facility] CATH LAB;  Service: Cardiology;  Laterality: N/A;    AV FISTULA PLACEMENT, BRACHIOCEPHALIC      INSERTION OF DIALYSIS CATHETER         Review of patient's allergies indicates:  No Known Allergies  Current Facility-Administered Medications   Medication Frequency    0.9%  NaCl infusion PRN    albuterol-ipratropium 2.5 mg-0.5 mg/3 mL nebulizer solution 3 mL Q6H    aspirin EC tablet 81 mg Daily    atorvastatin tablet 80 mg Daily    budesonide nebulizer solution 0.5 mg Q12H    carvediloL tablet 6.25 mg BID    cyproheptadine 4 mg tablet 4 mg BID    heparin (porcine) injection 5,000 Units Q12H    [START ON 10/14/2024] levoFLOXacin 250 mg/50 mL IVPB 250 mg Q48H    methylPREDNISolone sodium succinate injection 40 mg Q6H    mupirocin 2 % ointment BID    naloxone 0.4 mg/mL injection 0.02 mg PRN    nicotine 14 mg/24 hr 1 patch Daily    ondansetron injection 4 mg Q8H PRN    prochlorperazine injection Soln 5 mg Q6H PRN     roflumilast (DALIRESP) tablet 500 mcg Daily    sevelamer carbonate tablet 800 mg TID WM    sodium chloride 0.9% flush 10 mL Q12H PRN    ticagrelor tablet 90 mg BID     Family History       Problem Relation (Age of Onset)    Stroke Mother          Tobacco Use    Smoking status: Every Day     Current packs/day: 1.00     Average packs/day: 1 pack/day for 15.0 years (15.0 ttl pk-yrs)     Types: Cigarettes    Smokeless tobacco: Never   Substance and Sexual Activity    Alcohol use: Not Currently    Drug use: Not Currently    Sexual activity: Not on file     Review of Systems   Constitutional:  Negative for chills and fever.   HENT: Negative.     Respiratory:  Positive for shortness of breath and wheezing.    Cardiovascular:  Negative for chest pain and leg swelling.   Gastrointestinal:  Negative for abdominal pain and nausea.   Genitourinary:  Negative for dysuria and hematuria.   Neurological:  Negative for syncope.     Objective:     Vital Signs (Most Recent):  Temp: 98 °F (36.7 °C) (10/12/24 1307)  Pulse: (!) 111 (10/12/24 1307)  Resp: 19 (10/12/24 1307)  BP: (!) 101/47 (10/12/24 1307)  SpO2: 99 % (10/12/24 0717) Vital Signs (24h Range):  Temp:  [97.9 °F (36.6 °C)-98.6 °F (37 °C)] 98 °F (36.7 °C)  Pulse:  [] 111  Resp:  [12-22] 19  SpO2:  [95 %-100 %] 99 %  BP: ()/(32-78) 101/47     Weight: 71.7 kg (158 lb) (10/11/24 1528)  Body mass index is 22.04 kg/m².  Body surface area is 1.9 meters squared.    No intake/output data recorded.     Physical Exam  Eyes:      Pupils: Pupils are equal, round, and reactive to light.   Cardiovascular:      Rate and Rhythm: Normal rate and regular rhythm.   Pulmonary:      Breath sounds: Rhonchi present. No wheezing or rales.   Abdominal:      Palpations: Abdomen is soft.      Tenderness: There is no abdominal tenderness.   Musculoskeletal:      Cervical back: Neck supple.      Right lower leg: No edema.      Left lower leg: No edema.   Neurological:      General: No focal  deficit present.      Mental Status: He is alert.          Significant Labs:  BMP:   Recent Labs   Lab 10/12/24  0420   *   *   K 4.1      CO2 28   BUN 24*   CREATININE 4.11*   CALCIUM 8.6     CBC:   Recent Labs   Lab 10/12/24  0420   WBC 7.64   RBC 3.15*   HGB 9.2*   HCT 30.5*      MCV 96.8*   MCH 29.2   MCHC 30.2*       Significant Imaging:    Assessment/Plan:     Pulmonary  * Chronic obstructive pulmonary disease with acute exacerbation  He has severe COPD and requires continuous O2.  He has had multiple admissions for exacerbations.    Acute pulmonary edema  Volume removal during dialysis today    Cardiac/Vascular  CAD (coronary artery disease)  No angina    Hypertension  Controlled    Renal/  ESRD (end stage renal disease)  He is seen during dialysis.  Volume removal as tolerated.        Thank you for your consult.     Smooth Herrera MD  Nephrology  [facility] - [address] [place] [address]

## 2024-10-12 NOTE — ED NOTES
Spoke to Pt daughter.  Pt stays at [facility].  According to daughter pt is confused because he can normally carry on a conversation.  Pt states he lives at home but he stay at [facility].

## 2024-10-12 NOTE — PROGRESS NOTES
Patient identified at risk by screening criteria with MST3. He was unsure of weight loss but endorsed poor PO intakes. Patient is 71.7kg with a BMI of 22.04 and is within his ideal body weight range. Chart review reveals no significant weight changes. Will assess for malnutrition with physical exam on follow up when back on site.

## 2024-10-12 NOTE — ASSESSMENT & PLAN NOTE
Appears to be confused, no focal deficits noted.  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  Check B12 and TSH.   Monitor closely, if does not improve then will consider MRI, EEG and some lab work for evaluation.

## 2024-10-12 NOTE — H&P
[facility] - Emergency Department  Hospital Medicine  History & Physical    Patient Name: Michoacano Damon  MRN: [id number]  Patient Class: Emergency  Admission Date: 10/11/2024  Attending Physician:  NITA Newell DO  Primary Care Provider: Norma, Primary Doctor         Patient information was obtained from patient and ER records.     Subjective:     Principal Problem:Chronic obstructive pulmonary disease with acute exacerbation    Chief Complaint:   Chief Complaint   Patient presents with    Shortness of Breath     Pt picked up back EMS from dialysis for SOB.  EMS stated pt did about 2 1/2 hr of his dialysis treatment.          HPI: Patient is a 74-year-old male with a history of chronic combined heart failure with last known EF of 45% in the setting of a known CAD status post PCI with stent placement of LAD and Lcx, essential hypertension, end-stage renal disease on hemodialysis on Monday Wednesdays and Fridays and oxygen-dependent COPD who presented to the emergency room with a chief complaint of shortness of breath accompanied by nonproductive cough and wheezing. Patient stated that shortness breath started doing dialysis session today.  Patient otherwise denied any associated symptoms such as fever, chills, chest pain palpitation PND orthopnea or lower extremity edema.     On initial presentation patient was tachycardic but vital signs were otherwise stable and patient was afebrile.  Workup was notable for presence of mild interstitial edema seen on chest x-ray in the setting of a mildly elevated troponin levels 171-->191 without any ischemic abnormalities seen on EKG, macrocytic anemia with a hemoglobin in the 10 g range which is his baseline, but otherwise unremarkable for someone with end-stage renal disease on HD.  Physical exam was notable for a presence of diffuse rhonchi with wheezing but otherwise unremarkable without any peripheral edema.  Patient will be admitted with a working diagnosis of acute on  chronic combined heart failure along with acute exacerbation of COPD secondary to the former         Past Medical History:   Diagnosis Date    Anticoagulant long-term use     COPD (chronic obstructive pulmonary disease)     Coronary artery disease     ESRD on dialysis     HFrEF (heart failure with reduced ejection fraction) 2021    Hypertension        Past Surgical History:   Procedure Laterality Date    ANGIOGRAM, CORONARY, WITH LEFT HEART CATHETERIZATION N/A 8/27/2024    Procedure: Angiogram, Coronary, with Left Heart Cath;  Surgeon: Oziel Santos MD;  Location: [facility] CATH LAB;  Service: Cardiology;  Laterality: N/A;    AV FISTULA PLACEMENT, BRACHIOCEPHALIC      INSERTION OF DIALYSIS CATHETER         Review of patient's allergies indicates:  No Known Allergies    Current Facility-Administered Medications on File Prior to Encounter   Medication    [DISCONTINUED] GENERIC EXTERNAL MEDICATION    [DISCONTINUED] GENERIC EXTERNAL MEDICATION     Current Outpatient Medications on File Prior to Encounter   Medication Sig    albuterol-ipratropium (DUO-NEB) 2.5 mg-0.5 mg/3 mL nebulizer solution Take 3 mLs by nebulization every 6 (six) hours as needed for Wheezing. Rescue    arformoteroL (BROVANA) 15 mcg/2 mL Nebu Take 15 mcg by nebulization 2 (two) times a day. Controller    atorvastatin (LIPITOR) 80 MG tablet Take 1 tablet (80 mg total) by mouth once daily.    budesonide (PULMICORT) 0.5 mg/2 mL nebulizer solution Take 0.5 mg by nebulization 2 (two) times a day.    carvediloL (COREG) 12.5 MG tablet Take 12.5 mg by mouth 2 (two) times daily.    cyproheptadine (PERIACTIN) 4 mg tablet Take 4 mg by mouth 2 (two) times daily.    gabapentin (NEURONTIN) 100 MG capsule Take 100 mg by mouth 2 (two) times daily.    hydrALAZINE (APRESOLINE) 100 MG tablet Take 1 tablet (100 mg total) by mouth 3 (three) times daily.    HYDROcodone-acetaminophen (NORCO)  mg per tablet Take 1 tablet by mouth 3 (three) times daily as needed  for pain    montelukast (SINGULAIR) 10 mg tablet Take 1 tablet (10 mg total) by mouth every evening.    predniSONE (DELTASONE) 10 MG tablet Take by mouth. Take two tablets (20 mg total) by mouth one (1) time a day for 3 days, THEN one and one-half tablet (15 mg total) one (1) time a day for 3 days, THEN one tablet (10 mg total) one (1) time a day for 3 days.    roflumilast (DALIRESP) 500 mcg Tab Take 500 mcg by mouth once daily.    sevelamer carbonate (RENVELA) 800 mg Tab Take 800 mg by mouth 3 (three) times daily with meals.    ticagrelor (BRILINTA) 90 mg tablet Take 1 tablet (90 mg total) by mouth 2 (two) times a day.    [DISCONTINUED] olopatadine (PATANOL) 0.1 % ophthalmic solution Place 1 drop into both eyes once daily.    [DISCONTINUED] ROFLUMILAST ORAL Take 500 mcg by mouth Daily.     Family History       Problem Relation (Age of Onset)    Stroke Mother          Tobacco Use    Smoking status: Every Day     Current packs/day: 1.00     Average packs/day: 1 pack/day for 15.0 years (15.0 ttl pk-yrs)     Types: Cigarettes    Smokeless tobacco: Never   Substance and Sexual Activity    Alcohol use: Not Currently    Drug use: Not Currently    Sexual activity: Not on file     Review of Systems   Constitutional:  Negative for chills, diaphoresis, fatigue and fever.   HENT:  Negative for congestion, hearing loss, nosebleeds, postnasal drip, sore throat, tinnitus and trouble swallowing.    Eyes:  Negative for photophobia, pain, discharge, itching and visual disturbance.   Respiratory:  Positive for cough, shortness of breath and wheezing. Negative for stridor.    Cardiovascular:  Negative for chest pain, palpitations and leg swelling.   Gastrointestinal:  Negative for abdominal distention, abdominal pain, anal bleeding, blood in stool, constipation, diarrhea, nausea and vomiting.   Endocrine: Negative for cold intolerance, heat intolerance, polydipsia, polyphagia and polyuria.   Genitourinary:  Negative for decreased  urine volume, difficulty urinating, dysuria, flank pain, frequency, hematuria and urgency.   Musculoskeletal:  Negative for arthralgias, back pain, gait problem, joint swelling, myalgias, neck pain and neck stiffness.   Skin:  Negative for color change, pallor and rash.   Allergic/Immunologic: Negative for immunocompromised state.   Neurological:  Negative for dizziness, tremors, seizures, syncope, facial asymmetry, speech difficulty, weakness, light-headedness, numbness and headaches.   Hematological:  Negative for adenopathy. Does not bruise/bleed easily.     Objective:     Vital Signs (Most Recent):  Temp: 98.4 °F (36.9 °C) (10/11/24 2133)  Pulse: 103 (10/11/24 2233)  Resp: 12 (10/11/24 2233)  BP: 137/73 (10/11/24 2233)  SpO2: 100 % (10/11/24 2233) Vital Signs (24h Range):  Temp:  [98.1 °F (36.7 °C)-98.4 °F (36.9 °C)] 98.4 °F (36.9 °C)  Pulse:  [103-117] 103  Resp:  [12-22] 12  SpO2:  [97 %-100 %] 100 %  BP: (114-137)/(49-77) 137/73     Weight: 71.7 kg (158 lb)  Body mass index is 22.04 kg/m².     Physical Exam  Vitals reviewed.   Constitutional:       General: He is not in acute distress.     Appearance: Normal appearance.   HENT:      Head: Normocephalic and atraumatic.      Right Ear: External ear normal.      Left Ear: External ear normal.   Eyes:      Extraocular Movements: Extraocular movements intact.      Pupils: Pupils are equal, round, and reactive to light.   Cardiovascular:      Rate and Rhythm: Regular rhythm. Tachycardia present.      Pulses: Normal pulses.      Heart sounds: Normal heart sounds. No murmur heard.  Pulmonary:      Effort: Pulmonary effort is normal. No respiratory distress.      Breath sounds: Wheezing and rhonchi present.   Chest:      Chest wall: No tenderness.   Abdominal:      General: Abdomen is flat.      Palpations: Abdomen is soft. There is no mass.      Tenderness: There is no abdominal tenderness. There is no right CVA tenderness or left CVA tenderness.   Musculoskeletal:          General: No swelling or tenderness. Normal range of motion.   Skin:     General: Skin is warm and dry.      Capillary Refill: Capillary refill takes less than 2 seconds.   Neurological:      General: No focal deficit present.      Mental Status: He is alert and oriented to person, place, and time. Mental status is at baseline.              CRANIAL NERVES     CN III, IV, VI   Pupils are equal, round, and reactive to light.       Significant Labs: All pertinent labs within the past 24 hours have been reviewed.    Significant Imaging: I have reviewed all pertinent imaging results/findings within the past 24 hours.  Assessment/Plan:     * Chronic obstructive pulmonary disease with acute exacerbation    Patient developed acute exacerbation of COPD secondary to pulmonary edema.  Will start patient on empiric antibiotic along with IV steroid and scheduled neb treatments.  Nephrology will be consulted to address pulmonary edema    Anemia of renal disease    Anemia is likely due to chronic disease due to ESRD. Most recent hemoglobin and hematocrit are listed below.    Recent Labs     10/11/24  1705 10/11/24  1706   HGB 10.6*  --    HCT 36.3* 33*     Plan  - Monitor serial CBC: Daily  - Transfuse PRBC if patient becomes hemodynamically unstable, symptomatic or H/H drops below 7/21.  - Patient has not received any PRBC transfusions to date      Type 2 MI (myocardial infarction)    Patient has had elevated troponin levels 171-->191 without any ischemic abnormalities seen on EKG.  Patient is also completely asymptomatic and as such I feel the patient has type 2 MI secondary to acute decompensation of chronic combined heart failure      CAD (coronary artery disease)    Patient with known CAD s/p stent placement which is controlled Will continue ASA, Brillinta, and Statin and monitor for S/Sx of angina/ACS. Continue to monitor on telemetry.     ESRD (end stage renal disease)    Will consult nephrology to address  "hypervolemia/pulmonary edema    Hypertension    Will continue home antihypertensive with the exception of decreasing beta-blocker to half of its original dose given CHF decompensation    Acute on chronic combined systolic and diastolic congestive heart failure    Patient has Combined Systolic and Diastolic heart failure that is Acute on chronic. On presentation their CHF was decompensated. Evidence of decompensated CHF on presentation includes: crackles on lung auscultation and shortness of breath. The etiology of their decompensation is likely dietary indiscretion. Most recent BNP and echo results are listed below.  No results for input(s): "BNP" in the last 72 hours.  Latest ECHO  Results for orders placed during the hospital encounter of 08/25/24    Echo    Interpretation Summary    Left Ventricle: The left ventricle is smaller than normal. There is severe concentric hypertrophy. Regional wall motion abnormalities present.  Inferior wall hypokinesis is noted There is mildly reduced systolic function with a visually estimated ejection fraction of 40 - 50%. Ejection fraction by visual approximation is 45%. There is diastolic dysfunction.    Right Ventricle: Normal right ventricular cavity size.    Aortic Valve: The aortic valve is a trileaflet valve. Mildly calcified cusps.    Tricuspid Valve: There is mild regurgitation. There is mild pulmonary hypertension.    IVC/SVC: Normal venous pressure at 3 mmHg.    Pericardium: There is a trivial effusion.    Current Heart Failure Medications  carvediloL tablet 6.25 mg, 2 times daily, Oral  hydrALAZINE tablet 100 mg, 3 times daily, Oral    Plan  - Monitor strict I&Os and daily weights.    - Place on telemetry  - Low sodium diet  - Place on fluid restriction of 1 L.   - Nephrology will be consulted for hypervolemia      VTE Risk Mitigation (From admission, onward)      Heparin 5000 units subQ q.12 hours                            David Vega MD  Department of Hospital " Medicine  [facility] - Emergency Department

## 2024-10-12 NOTE — ASSESSMENT & PLAN NOTE
Patient's COPD is with exacerbation noted by continued dyspnea and use of accessory muscles for breathing currently.  Patient is currently off COPD Pathway. Continue scheduled inhalers Steroids, Antibiotics, and Supplemental Oxygen and monitor respiratory status closely.   Patient wears 2L NC at baseline and oxygenation has been stable.

## 2024-10-12 NOTE — SUBJECTIVE & OBJECTIVE
Past Medical History:   Diagnosis Date    Anticoagulant long-term use     COPD (chronic obstructive pulmonary disease)     Coronary artery disease     ESRD on dialysis     HFrEF (heart failure with reduced ejection fraction) 2021    Hypertension        Past Surgical History:   Procedure Laterality Date    ANGIOGRAM, CORONARY, WITH LEFT HEART CATHETERIZATION N/A 8/27/2024    Procedure: Angiogram, Coronary, with Left Heart Cath;  Surgeon: Oziel Santos MD;  Location: [facility] CATH LAB;  Service: Cardiology;  Laterality: N/A;    AV FISTULA PLACEMENT, BRACHIOCEPHALIC      INSERTION OF DIALYSIS CATHETER         Review of patient's allergies indicates:  No Known Allergies    Current Facility-Administered Medications on File Prior to Encounter   Medication    [DISCONTINUED] GENERIC EXTERNAL MEDICATION    [DISCONTINUED] GENERIC EXTERNAL MEDICATION     Current Outpatient Medications on File Prior to Encounter   Medication Sig    albuterol-ipratropium (DUO-NEB) 2.5 mg-0.5 mg/3 mL nebulizer solution Take 3 mLs by nebulization every 6 (six) hours as needed for Wheezing. Rescue    arformoteroL (BROVANA) 15 mcg/2 mL Nebu Take 15 mcg by nebulization 2 (two) times a day. Controller    atorvastatin (LIPITOR) 80 MG tablet Take 1 tablet (80 mg total) by mouth once daily.    budesonide (PULMICORT) 0.5 mg/2 mL nebulizer solution Take 0.5 mg by nebulization 2 (two) times a day.    carvediloL (COREG) 12.5 MG tablet Take 12.5 mg by mouth 2 (two) times daily.    cyproheptadine (PERIACTIN) 4 mg tablet Take 4 mg by mouth 2 (two) times daily.    gabapentin (NEURONTIN) 100 MG capsule Take 100 mg by mouth 2 (two) times daily.    hydrALAZINE (APRESOLINE) 100 MG tablet Take 1 tablet (100 mg total) by mouth 3 (three) times daily.    HYDROcodone-acetaminophen (NORCO)  mg per tablet Take 1 tablet by mouth 3 (three) times daily as needed for pain    montelukast (SINGULAIR) 10 mg tablet Take 1 tablet (10 mg total) by mouth every evening.     predniSONE (DELTASONE) 10 MG tablet Take by mouth. Take two tablets (20 mg total) by mouth one (1) time a day for 3 days, THEN one and one-half tablet (15 mg total) one (1) time a day for 3 days, THEN one tablet (10 mg total) one (1) time a day for 3 days.    roflumilast (DALIRESP) 500 mcg Tab Take 500 mcg by mouth once daily.    sevelamer carbonate (RENVELA) 800 mg Tab Take 800 mg by mouth 3 (three) times daily with meals.    ticagrelor (BRILINTA) 90 mg tablet Take 1 tablet (90 mg total) by mouth 2 (two) times a day.    [DISCONTINUED] olopatadine (PATANOL) 0.1 % ophthalmic solution Place 1 drop into both eyes once daily.    [DISCONTINUED] ROFLUMILAST ORAL Take 500 mcg by mouth Daily.     Family History       Problem Relation (Age of Onset)    Stroke Mother          Tobacco Use    Smoking status: Every Day     Current packs/day: 1.00     Average packs/day: 1 pack/day for 15.0 years (15.0 ttl pk-yrs)     Types: Cigarettes    Smokeless tobacco: Never   Substance and Sexual Activity    Alcohol use: Not Currently    Drug use: Not Currently    Sexual activity: Not on file     Review of Systems   Constitutional:  Negative for chills, diaphoresis, fatigue and fever.   HENT:  Negative for congestion, hearing loss, nosebleeds, postnasal drip, sore throat, tinnitus and trouble swallowing.    Eyes:  Negative for photophobia, pain, discharge, itching and visual disturbance.   Respiratory:  Positive for cough, shortness of breath and wheezing. Negative for stridor.    Cardiovascular:  Negative for chest pain, palpitations and leg swelling.   Gastrointestinal:  Negative for abdominal distention, abdominal pain, anal bleeding, blood in stool, constipation, diarrhea, nausea and vomiting.   Endocrine: Negative for cold intolerance, heat intolerance, polydipsia, polyphagia and polyuria.   Genitourinary:  Negative for decreased urine volume, difficulty urinating, dysuria, flank pain, frequency, hematuria and urgency.    Musculoskeletal:  Negative for arthralgias, back pain, gait problem, joint swelling, myalgias, neck pain and neck stiffness.   Skin:  Negative for color change, pallor and rash.   Allergic/Immunologic: Negative for immunocompromised state.   Neurological:  Negative for dizziness, tremors, seizures, syncope, facial asymmetry, speech difficulty, weakness, light-headedness, numbness and headaches.   Hematological:  Negative for adenopathy. Does not bruise/bleed easily.     Objective:     Vital Signs (Most Recent):  Temp: 98.4 °F (36.9 °C) (10/11/24 2133)  Pulse: 103 (10/11/24 2233)  Resp: 12 (10/11/24 2233)  BP: 137/73 (10/11/24 2233)  SpO2: 100 % (10/11/24 2233) Vital Signs (24h Range):  Temp:  [98.1 °F (36.7 °C)-98.4 °F (36.9 °C)] 98.4 °F (36.9 °C)  Pulse:  [103-117] 103  Resp:  [12-22] 12  SpO2:  [97 %-100 %] 100 %  BP: (114-137)/(49-77) 137/73     Weight: 71.7 kg (158 lb)  Body mass index is 22.04 kg/m².     Physical Exam  Vitals reviewed.   Constitutional:       General: He is not in acute distress.     Appearance: Normal appearance.   HENT:      Head: Normocephalic and atraumatic.      Right Ear: External ear normal.      Left Ear: External ear normal.   Eyes:      Extraocular Movements: Extraocular movements intact.      Pupils: Pupils are equal, round, and reactive to light.   Cardiovascular:      Rate and Rhythm: Regular rhythm. Tachycardia present.      Pulses: Normal pulses.      Heart sounds: Normal heart sounds. No murmur heard.  Pulmonary:      Effort: Pulmonary effort is normal. No respiratory distress.      Breath sounds: Wheezing and rhonchi present.   Chest:      Chest wall: No tenderness.   Abdominal:      General: Abdomen is flat.      Palpations: Abdomen is soft. There is no mass.      Tenderness: There is no abdominal tenderness. There is no right CVA tenderness or left CVA tenderness.   Musculoskeletal:         General: No swelling or tenderness. Normal range of motion.   Skin:     General:  Skin is warm and dry.      Capillary Refill: Capillary refill takes less than 2 seconds.   Neurological:      General: No focal deficit present.      Mental Status: He is alert and oriented to person, place, and time. Mental status is at baseline.              CRANIAL NERVES     CN III, IV, VI   Pupils are equal, round, and reactive to light.       Significant Labs: All pertinent labs within the past 24 hours have been reviewed.    Significant Imaging: I have reviewed all pertinent imaging results/findings within the past 24 hours.

## 2024-10-12 NOTE — ASSESSMENT & PLAN NOTE
Anemia is likely due to chronic disease due to ESRD. Most recent hemoglobin and hematocrit are listed below.    Recent Labs     10/11/24  1705 10/11/24  1706 10/12/24  0420   HGB 10.6*  --  9.2*   HCT 36.3* 33* 30.5*       Plan  - Monitor serial CBC: Daily  - Transfuse PRBC if patient becomes hemodynamically unstable, symptomatic or H/H drops below 7/21.  - Patient has not received any PRBC transfusions to date  - Given macrocytosis, will check TSH and B12/folate.

## 2024-10-12 NOTE — ASSESSMENT & PLAN NOTE
Patients blood pressure range in the last 24 hours was: BP  Min: 88/52  Max: 147/78.The patient's inpatient anti-hypertensive regimen is listed below:  Current Antihypertensives  carvediloL tablet 6.25 mg, 2 times daily, Oral    Plan  - BP is uncontrolled, will adjust as follows: hypotension noted, cautious with Coreg  - Off hydralazine per medication review.

## 2024-10-12 NOTE — ASSESSMENT & PLAN NOTE
Patient has had elevated troponin levels 171-->191-->175 without any ischemic abnormalities seen on EKG.    Patient is also completely asymptomatic and as such I feel the patient has type 2 MI secondary to acute decompensation of chronic combined heart failure.  Monitor for chest pain, evaluate further if develops concerning symptoms.

## 2024-10-12 NOTE — PROGRESS NOTES
[facility] Hospital Medicine  Progress Note    Patient Name: Michoacano Damon  MRN: [id number]  Patient Class: IP- Inpatient   Admission Date: 10/11/2024  Length of Stay: 1 days  Attending Physician: Joel Sellers MD  Primary Care Provider: Norma, Primary Doctor        Subjective:     Principal Problem:Chronic obstructive pulmonary disease with acute exacerbation        HPI:  Patient is a 74-year-old male with a history of chronic combined heart failure with last known EF of 45% in the setting of a known CAD status post PCI with stent placement of LAD and Lcx, essential hypertension, end-stage renal disease on hemodialysis on Monday Wednesdays and Fridays and oxygen-dependent COPD who presented to the emergency room with a chief complaint of shortness of breath accompanied by nonproductive cough and wheezing. Patient stated that shortness breath started doing dialysis session today.  Patient otherwise denied any associated symptoms such as fever, chills, chest pain palpitation PND orthopnea or lower extremity edema.     On initial presentation patient was tachycardic but vital signs were otherwise stable and patient was afebrile.  Workup was notable for presence of mild interstitial edema seen on chest x-ray in the setting of a mildly elevated troponin levels 171-->191 without any ischemic abnormalities seen on EKG, macrocytic anemia with a hemoglobin in the 10 g range which is his baseline, but otherwise unremarkable for someone with end-stage renal disease on HD.  Physical exam was notable for a presence of diffuse rhonchi with wheezing but otherwise unremarkable without any peripheral edema.  Patient will be admitted with a working diagnosis of acute on chronic combined heart failure along with acute exacerbation of COPD secondary to the former         Overview/Hospital Course:  10/12- Resp status is stable, BP noted to be low. Awaiting nephrology recommendations with regards to  HD. Continue IV steroids, antibiotics, bronchodilator.     Interval History: Patient seen and examined at the bedside, reports breathing better, appears confused but redirectable.     Review of Systems   Respiratory:  Positive for shortness of breath.    All other systems reviewed and are negative.    Objective:     Vital Signs (Most Recent):  Temp: 98.6 °F (37 °C) (10/12/24 0708)  Pulse: 104 (10/12/24 1100)  Resp: 17 (10/12/24 1100)  BP: (!) 92/52 (10/12/24 1100)  SpO2: 99 % (10/12/24 0717) Vital Signs (24h Range):  Temp:  [97.9 °F (36.6 °C)-98.6 °F (37 °C)] 98.6 °F (37 °C)  Pulse:  [] 104  Resp:  [12-22] 17  SpO2:  [95 %-100 %] 99 %  BP: ()/(47-78) 92/52     Weight: 71.7 kg (158 lb)  Body mass index is 22.04 kg/m².  No intake or output data in the 24 hours ending 10/12/24 1119      Physical Exam  Vitals and nursing note reviewed.   Constitutional:       General: He is awake.      Appearance: He is ill-appearing.   HENT:      Head: Normocephalic and atraumatic.      Nose: Nose normal.      Mouth/Throat:      Mouth: Mucous membranes are moist.   Eyes:      Extraocular Movements: Extraocular movements intact.   Cardiovascular:      Rate and Rhythm: Normal rate and regular rhythm.      Pulses: Normal pulses.      Heart sounds: Normal heart sounds.   Pulmonary:      Effort: Pulmonary effort is normal.      Breath sounds: Wheezing present.   Abdominal:      General: Bowel sounds are normal.      Palpations: Abdomen is soft.   Musculoskeletal:         General: Normal range of motion.      Cervical back: Normal range of motion.      Right lower leg: Edema present.      Left lower leg: Edema present.   Skin:     General: Skin is warm.   Neurological:      General: No focal deficit present.      Mental Status: He is alert. Mental status is at baseline. He is disoriented.   Psychiatric:         Mood and Affect: Mood normal.         Behavior: Behavior normal.             Significant Labs: All pertinent labs within  "the past 24 hours have been reviewed.    Significant Imaging: I have reviewed all pertinent imaging results/findings within the past 24 hours.    Assessment/Plan:      * Chronic obstructive pulmonary disease with acute exacerbation  Patient's COPD is with exacerbation noted by continued dyspnea and use of accessory muscles for breathing currently.  Patient is currently off COPD Pathway. Continue scheduled inhalers Steroids, Antibiotics, and Supplemental Oxygen and monitor respiratory status closely.   Patient wears 2L NC at baseline and oxygenation has been stable.     Type 2 MI (myocardial infarction)  Patient has had elevated troponin levels 171-->191-->175 without any ischemic abnormalities seen on EKG.    Patient is also completely asymptomatic and as such I feel the patient has type 2 MI secondary to acute decompensation of chronic combined heart failure.  Monitor for chest pain, evaluate further if develops concerning symptoms.       Acute metabolic encephalopathy  Appears to be confused, no focal deficits noted.  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  Check B12 and TSH.   Monitor closely, if does not improve then will consider MRI, EEG and some lab work for evaluation.       Acute on chronic combined systolic and diastolic congestive heart failure  Patient has Combined Systolic and Diastolic heart failure that is Acute on chronic. On presentation their CHF was decompensated. Evidence of decompensated CHF on presentation includes: crackles on lung auscultation and shortness of breath. The etiology of their decompensation is likely dietary indiscretion. Most recent BNP and echo results are listed below.  No results for input(s): "BNP" in the last 72 hours.  Latest ECHO  Results for orders placed during the hospital encounter of 08/25/24    Echo    Interpretation Summary    Left Ventricle: The left ventricle is smaller than normal. There is severe concentric hypertrophy. Regional wall motion abnormalities present.  Inferior " wall hypokinesis is noted There is mildly reduced systolic function with a visually estimated ejection fraction of 40 - 50%. Ejection fraction by visual approximation is 45%. There is diastolic dysfunction.    Right Ventricle: Normal right ventricular cavity size.    Aortic Valve: The aortic valve is a trileaflet valve. Mildly calcified cusps.    Tricuspid Valve: There is mild regurgitation. There is mild pulmonary hypertension.    IVC/SVC: Normal venous pressure at 3 mmHg.    Pericardium: There is a trivial effusion.    Current Heart Failure Medications  carvediloL tablet 6.25 mg, 2 times daily, Oral    Plan  - Monitor strict I&Os and daily weights.    - Place on telemetry  - Low sodium diet  - Place on fluid restriction of 1 L.   - Volume removal with dialysis.     ESRD (end stage renal disease)  On M/W/F dilaysis, missed session on date of admission as he got ill/short of breath (10/11/24).   Nephrology consulted to resume dialysis.     Idiopathic hypotension  BP 90/50's.  Lactate WNL.   Blood cultures sent in ED, no evidence of infection noted yet, no fever, normal WBC count.   Off hydralazine per med review, resume Coreg with caution.   May require midodrine if remains hypotensive, especially in the setting of requiring dialysis.       Debility  Patient with Acute on chronic debility due to age-related physical debility and co morbid conditions . The patient's latest AMPAC (Activity Measure for Post Acute Care) Score is listed below.    AM-PAC Score - How much help does the patient need for each activity listed  Basic Mobility Total Score: 6  Turning over in bed (including adjusting bedclothes, sheets and blankets)?: Unable  Sitting down on and standing up from a chair with arms (e.g., wheelchair, bedside commode, etc.): Unable  Moving from lying on back to sitting on the side of the bed?: Unable  Moving to and from a bed to a chair (including a wheelchair)?: Unable  Need to walk in hospital room?:  Unable  Climbing 3-5 steps with a railing?: Unable    Plan  - Progressive mobility protocol initated  - PT/OT consulted  - Fall precautions in place  - Resides at [facility] and plans to return.           Tobacco dependency  Dangers of cigarette smoking were reviewed with patient in detail. Patient was Counseled for 3-10 minutes. Nicotine replacement options were discussed. Nicotine replacement was discussed- prescribed    Anemia of renal disease  Anemia is likely due to chronic disease due to ESRD. Most recent hemoglobin and hematocrit are listed below.    Recent Labs     10/11/24  1705 10/11/24  1706 10/12/24  0420   HGB 10.6*  --  9.2*   HCT 36.3* 33* 30.5*       Plan  - Monitor serial CBC: Daily  - Transfuse PRBC if patient becomes hemodynamically unstable, symptomatic or H/H drops below 7/21.  - Patient has not received any PRBC transfusions to date  - Given macrocytosis, will check TSH and B12/folate.       CAD (coronary artery disease)  Patient with known CAD s/p stent placement which is controlled Will continue ASA, Brillinta, and Statin and monitor for S/Sx of angina/ACS. Continue to monitor on telemetry.     Hypertension  Patients blood pressure range in the last 24 hours was: BP  Min: 88/52  Max: 147/78.The patient's inpatient anti-hypertensive regimen is listed below:  Current Antihypertensives  carvediloL tablet 6.25 mg, 2 times daily, Oral    Plan  - BP is uncontrolled, will adjust as follows: hypotension noted, cautious with Coreg  - Off hydralazine per medication review.        VTE Risk Mitigation (From admission, onward)           Ordered     heparin (porcine) injection 5,000 Units  Every 12 hours         10/11/24 2350     IP VTE HIGH RISK PATIENT  Once         10/11/24 2350     Place sequential compression device  Until discontinued         10/11/24 2350                    Discharge Planning   MARCIAL: 10/15/2024     Code Status: Full Code   Is the patient medically ready for discharge?:     Reason for  patient still in hospital (select all that apply): Patient trending condition, Treatment, and Consult recommendations  Discharge Plan A: Return to nursing home                  LEV HERNANDEZ MD  Department of Hospital Medicine   [facility] - [facility]

## 2024-10-12 NOTE — PHARMACY MED REC
"Admission Medication History     The home medication history was taken by Inna Fish.    You may go to "Admission" then "Reconcile Home Medications" tabs to review and/or act upon these items.     The home medication list has been updated by the Pharmacy department.   Please read ALL comments highlighted in yellow.   Please address this information as you see fit.    Feel free to contact us if you have any questions or require assistance.    The following medications were added:  Nephro-enmanuel   Cyproheptadine 4 mg  Colace 4 mg  Gabapentin 100 mg  First-Mouthwash BLM -171-40/30 mL  Chloraseptic spray  Prednisone 10 mg      The medications listed below were removed from the home medication list. Please reorder if appropriate:  Patient reports no longer taking the following medication(s):  Hydralazine 100 mg  Montelukast 10 mg      Medications listed below were obtained from: Analytic software- [employer], Medical records, and Nursing home  PTA Medications   Medication Sig    albuterol-ipratropium (DUO-NEB) 2.5 mg-0.5 mg/3 mL nebulizer solution Take 3 mLs by nebulization every 6 (six) hours as needed for Wheezing. Rescue    arformoteroL (BROVANA) 15 mcg/2 mL Nebu Take 15 mcg by nebulization 2 (two) times a day. Controller    atorvastatin (LIPITOR) 80 MG tablet Take 1 tablet (80 mg total) by mouth once daily.    B complex-vitamin C-folic acid (NEPHRO-ENMANUEL) 0.8 mg Tab Take 1 tablet by mouth every evening.    budesonide (PULMICORT) 0.5 mg/2 mL nebulizer solution Take 0.5 mg by nebulization 2 (two) times a day.    carvediloL (COREG) 12.5 MG tablet Take 12.5 mg by mouth 2 (two) times daily.    cyproheptadine (PERIACTIN) 4 mg tablet Take 4 mg by mouth 2 (two) times daily.    docusate sodium (COLACE) 100 MG capsule Take 100 mg by mouth 2 (two) times daily.    gabapentin (NEURONTIN) 100 MG capsule Take 100 mg by mouth 2 (two) times daily.    HYDROcodone-acetaminophen (NORCO)  mg per tablet Take 1 tablet by mouth 3 " (three) times daily as needed for pain    LIDOcaine-diphenhyd-Al-mag-sim (FIRST-MOUTHWASH BLM) -661-40 mg/30mL mouthwash suspension Swish and spit 15 mLs every 4 (four) hours as needed (for mouth pain).    phenoL (CHLORASEPTIC) 1.4 % SprA Take 4 sprays by mouth every 8 (eight) hours as needed (for mouth pain).    predniSONE (DELTASONE) 10 MG tablet Take by mouth. Take two tablets (20 mg total) by mouth one (1) time a day for 3 days, THEN one and one-half tablet (15 mg total) one (1) time a day for 3 days, THEN one tablet (10 mg total) one (1) time a day for 3 days.    roflumilast (DALIRESP) 500 mcg Tab Take 500 mcg by mouth once daily.    sevelamer carbonate (RENVELA) 800 mg Tab Take 800 mg by mouth 3 (three) times daily with meals.    ticagrelor (BRILINTA) 90 mg tablet Take 1 tablet (90 mg total) by mouth 2 (two) times a day.         Current Outpatient Medications on File Prior to Encounter   Medication Sig Dispense Refill Last Dose/Taking    albuterol-ipratropium (DUO-NEB) 2.5 mg-0.5 mg/3 mL nebulizer solution Take 3 mLs by nebulization every 6 (six) hours as needed for Wheezing. Rescue   Taking As Needed    arformoteroL (BROVANA) 15 mcg/2 mL Nebu Take 15 mcg by nebulization 2 (two) times a day. Controller   10/11/2024    atorvastatin (LIPITOR) 80 MG tablet Take 1 tablet (80 mg total) by mouth once daily.   10/10/2024 Evening    B complex-vitamin C-folic acid (NEPHRO-ENMANUEL) 0.8 mg Tab Take 1 tablet by mouth every evening.   10/10/2024 Evening    budesonide (PULMICORT) 0.5 mg/2 mL nebulizer solution Take 0.5 mg by nebulization 2 (two) times a day.   10/11/2024    carvediloL (COREG) 12.5 MG tablet Take 12.5 mg by mouth 2 (two) times daily.   10/11/2024    cyproheptadine (PERIACTIN) 4 mg tablet Take 4 mg by mouth 2 (two) times daily.   10/11/2024    docusate sodium (COLACE) 100 MG capsule Take 100 mg by mouth 2 (two) times daily.   10/11/2024    gabapentin (NEURONTIN) 100 MG capsule Take 100 mg by mouth 2 (two)  times daily.   10/11/2024    HYDROcodone-acetaminophen (NORCO)  mg per tablet Take 1 tablet by mouth 3 (three) times daily as needed for pain 90 tablet 0 10/11/2024    LIDOcaine-diphenhyd-Al-mag-sim (FIRST-MOUTHWASH BLM) -970-40 mg/30mL mouthwash suspension Swish and spit 15 mLs every 4 (four) hours as needed (for mouth pain).   Taking As Needed    phenoL (CHLORASEPTIC) 1.4 % SprA Take 4 sprays by mouth every 8 (eight) hours as needed (for mouth pain).   Taking As Needed    predniSONE (DELTASONE) 10 MG tablet Take by mouth. Take two tablets (20 mg total) by mouth one (1) time a day for 3 days, THEN one and one-half tablet (15 mg total) one (1) time a day for 3 days, THEN one tablet (10 mg total) one (1) time a day for 3 days.   10/11/2024    roflumilast (DALIRESP) 500 mcg Tab Take 500 mcg by mouth once daily.   10/11/2024    sevelamer carbonate (RENVELA) 800 mg Tab Take 800 mg by mouth 3 (three) times daily with meals.   10/11/2024    ticagrelor (BRILINTA) 90 mg tablet Take 1 tablet (90 mg total) by mouth 2 (two) times a day. 60 tablet 2 10/11/2024    [DISCONTINUED] hydrALAZINE (APRESOLINE) 100 MG tablet Take 1 tablet (100 mg total) by mouth 3 (three) times daily. 270 tablet 0     [DISCONTINUED] montelukast (SINGULAIR) 10 mg tablet Take 1 tablet (10 mg total) by mouth every evening. 90 tablet 1        Potential issues to be addressed PRIOR TO DISCHARGE  N/A    Inna Fish  EXT [id number]                 .

## 2024-10-12 NOTE — HOSPITAL COURSE
74 year old presents with shortness of breath.  He has known COPD and uses 2-3L O2 at home.  He has had multiple hospital admissions for COPD exacerbations.  He was placed on nebs and steroids with improvement.  CXR showed mild edema; he had a mildly elevated troponin and BNP.  He has known chronic systolic and diastolic CHF.  He was continued on dialysis, which helped with his fluid overload.  He received 2 doses of Levaquin, but does not appear to have pneumonia, and he will not need abx at discharge.  He appears to be at baseline, and does not appear SOB.  He appeared confused on admission but now appears to be mentally at baseline; CT brain negative for acute abnormalities.  He probably has mild dementia.  Patient denies chest pain, shortness of breath, nausea, vomiting, or diarrhea and is stable for discharge.

## 2024-10-12 NOTE — ASSESSMENT & PLAN NOTE
BP 90/50's.  Lactate WNL.   Blood cultures sent in ED, no evidence of infection noted yet, no fever, normal WBC count.   Off hydralazine per med review, resume Coreg with caution.   May require midodrine if remains hypotensive, especially in the setting of requiring dialysis.

## 2024-10-12 NOTE — ASSESSMENT & PLAN NOTE
Patient developed acute exacerbation of COPD secondary to pulmonary edema.  Will start patient on empiric antibiotic along with IV steroid and scheduled neb treatments.  Nephrology will be consulted to address pulmonary edema

## 2024-10-12 NOTE — SUBJECTIVE & OBJECTIVE
Past Medical History:   Diagnosis Date    Anticoagulant long-term use     COPD (chronic obstructive pulmonary disease)     Coronary artery disease     ESRD on dialysis     HFrEF (heart failure with reduced ejection fraction) 2021    Hypertension        Past Surgical History:   Procedure Laterality Date    ANGIOGRAM, CORONARY, WITH LEFT HEART CATHETERIZATION N/A 8/27/2024    Procedure: Angiogram, Coronary, with Left Heart Cath;  Surgeon: Oziel Santos MD;  Location: [facility] CATH LAB;  Service: Cardiology;  Laterality: N/A;    AV FISTULA PLACEMENT, BRACHIOCEPHALIC      INSERTION OF DIALYSIS CATHETER         Review of patient's allergies indicates:  No Known Allergies  Current Facility-Administered Medications   Medication Frequency    0.9%  NaCl infusion PRN    albuterol-ipratropium 2.5 mg-0.5 mg/3 mL nebulizer solution 3 mL Q6H    aspirin EC tablet 81 mg Daily    atorvastatin tablet 80 mg Daily    budesonide nebulizer solution 0.5 mg Q12H    carvediloL tablet 6.25 mg BID    cyproheptadine 4 mg tablet 4 mg BID    heparin (porcine) injection 5,000 Units Q12H    [START ON 10/14/2024] levoFLOXacin 250 mg/50 mL IVPB 250 mg Q48H    methylPREDNISolone sodium succinate injection 40 mg Q6H    mupirocin 2 % ointment BID    naloxone 0.4 mg/mL injection 0.02 mg PRN    nicotine 14 mg/24 hr 1 patch Daily    ondansetron injection 4 mg Q8H PRN    prochlorperazine injection Soln 5 mg Q6H PRN    roflumilast (DALIRESP) tablet 500 mcg Daily    sevelamer carbonate tablet 800 mg TID WM    sodium chloride 0.9% flush 10 mL Q12H PRN    ticagrelor tablet 90 mg BID     Family History       Problem Relation (Age of Onset)    Stroke Mother          Tobacco Use    Smoking status: Every Day     Current packs/day: 1.00     Average packs/day: 1 pack/day for 15.0 years (15.0 ttl pk-yrs)     Types: Cigarettes    Smokeless tobacco: Never   Substance and Sexual Activity    Alcohol use: Not Currently    Drug use: Not Currently    Sexual  activity: Not on file     Review of Systems   Constitutional:  Negative for chills and fever.   HENT: Negative.     Respiratory:  Positive for shortness of breath and wheezing.    Cardiovascular:  Negative for chest pain and leg swelling.   Gastrointestinal:  Negative for abdominal pain and nausea.   Genitourinary:  Negative for dysuria and hematuria.   Neurological:  Negative for syncope.     Objective:     Vital Signs (Most Recent):  Temp: 98 °F (36.7 °C) (10/12/24 1307)  Pulse: (!) 111 (10/12/24 1307)  Resp: 19 (10/12/24 1307)  BP: (!) 101/47 (10/12/24 1307)  SpO2: 99 % (10/12/24 0717) Vital Signs (24h Range):  Temp:  [97.9 °F (36.6 °C)-98.6 °F (37 °C)] 98 °F (36.7 °C)  Pulse:  [] 111  Resp:  [12-22] 19  SpO2:  [95 %-100 %] 99 %  BP: ()/(32-78) 101/47     Weight: 71.7 kg (158 lb) (10/11/24 1528)  Body mass index is 22.04 kg/m².  Body surface area is 1.9 meters squared.    No intake/output data recorded.     Physical Exam  Eyes:      Pupils: Pupils are equal, round, and reactive to light.   Cardiovascular:      Rate and Rhythm: Normal rate and regular rhythm.   Pulmonary:      Breath sounds: Rhonchi present. No wheezing or rales.   Abdominal:      Palpations: Abdomen is soft.      Tenderness: There is no abdominal tenderness.   Musculoskeletal:      Cervical back: Neck supple.      Right lower leg: No edema.      Left lower leg: No edema.   Neurological:      General: No focal deficit present.      Mental Status: He is alert.          Significant Labs:  BMP:   Recent Labs   Lab 10/12/24  0420   *   *   K 4.1      CO2 28   BUN 24*   CREATININE 4.11*   CALCIUM 8.6     CBC:   Recent Labs   Lab 10/12/24  0420   WBC 7.64   RBC 3.15*   HGB 9.2*   HCT 30.5*      MCV 96.8*   MCH 29.2   MCHC 30.2*       Significant Imaging:

## 2024-10-12 NOTE — ASSESSMENT & PLAN NOTE
Patient with Acute on chronic debility due to age-related physical debility and co morbid conditions . The patient's latest AMPAC (Activity Measure for Post Acute Care) Score is listed below.    AM-PAC Score - How much help does the patient need for each activity listed  Basic Mobility Total Score: 6  Turning over in bed (including adjusting bedclothes, sheets and blankets)?: Unable  Sitting down on and standing up from a chair with arms (e.g., wheelchair, bedside commode, etc.): Unable  Moving from lying on back to sitting on the side of the bed?: Unable  Moving to and from a bed to a chair (including a wheelchair)?: Unable  Need to walk in hospital room?: Unable  Climbing 3-5 steps with a railing?: Unable    Plan  - Progressive mobility protocol initated  - PT/OT consulted  - Fall precautions in place  - Resides at [facility] and plans to return.

## 2024-10-12 NOTE — ASSESSMENT & PLAN NOTE
Patient has had elevated troponin levels 171-->191 without any ischemic abnormalities seen on EKG.  Patient is also completely asymptomatic and as such I feel the patient has type 2 MI secondary to acute decompensation of chronic combined heart failure

## 2024-10-13 PROBLEM — M54.9 CHRONIC MIDLINE BACK PAIN: Status: ACTIVE | Noted: 2024-10-13

## 2024-10-13 PROBLEM — G89.29 CHRONIC MIDLINE BACK PAIN: Status: ACTIVE | Noted: 2024-10-13

## 2024-10-13 LAB
ALBUMIN SERPL BCP-MCNC: 2 G/DL (ref 3.5–5)
ANION GAP SERPL CALCULATED.3IONS-SCNC: 8 MMOL/L (ref 7–16)
ANISOCYTOSIS BLD QL SMEAR: ABNORMAL
BASOPHILS # BLD AUTO: 0.02 K/UL (ref 0–0.2)
BASOPHILS NFR BLD AUTO: 0.2 % (ref 0–1)
BUN SERPL-MCNC: 21 MG/DL (ref 7–18)
BUN/CREAT SERPL: 6 (ref 6–20)
CALCIUM SERPL-MCNC: 9 MG/DL (ref 8.5–10.1)
CHLORIDE SERPL-SCNC: 105 MMOL/L (ref 98–107)
CO2 SERPL-SCNC: 30 MMOL/L (ref 21–32)
CREAT SERPL-MCNC: 3.47 MG/DL (ref 0.7–1.3)
CRENATED CELLS: ABNORMAL
DIFFERENTIAL METHOD BLD: ABNORMAL
EGFR (NO RACE VARIABLE) (RUSH/TITUS): 18 ML/MIN/1.73M2
EOSINOPHIL # BLD AUTO: 0.01 K/UL (ref 0–0.5)
EOSINOPHIL NFR BLD AUTO: 0.1 % (ref 1–4)
ERYTHROCYTE [DISTWIDTH] IN BLOOD BY AUTOMATED COUNT: 18.1 % (ref 11.5–14.5)
GLUCOSE SERPL-MCNC: 133 MG/DL (ref 74–106)
HCT VFR BLD AUTO: 29 % (ref 40–54)
HGB BLD-MCNC: 9 G/DL (ref 13.5–18)
IMM GRANULOCYTES # BLD AUTO: 0.55 K/UL (ref 0–0.04)
IMM GRANULOCYTES NFR BLD: 5 % (ref 0–0.4)
LYMPHOCYTES # BLD AUTO: 0.64 K/UL (ref 1–4.8)
LYMPHOCYTES NFR BLD AUTO: 5.8 % (ref 27–41)
LYMPHOCYTES NFR BLD MANUAL: 7 % (ref 27–41)
MACROCYTES BLD QL SMEAR: ABNORMAL
MCH RBC QN AUTO: 29.5 PG (ref 27–31)
MCHC RBC AUTO-ENTMCNC: 31 G/DL (ref 32–36)
MCV RBC AUTO: 95.1 FL (ref 80–96)
METAMYELOCYTES NFR BLD MANUAL: 1 %
MONOCYTES # BLD AUTO: 0.65 K/UL (ref 0–0.8)
MONOCYTES NFR BLD AUTO: 5.9 % (ref 2–6)
MONOCYTES NFR BLD MANUAL: 4 % (ref 2–6)
MPC BLD CALC-MCNC: 9.9 FL (ref 9.4–12.4)
MYELOCYTES NFR BLD MANUAL: 1 %
NEUTROPHILS # BLD AUTO: 9.11 K/UL (ref 1.8–7.7)
NEUTROPHILS NFR BLD AUTO: 83 % (ref 53–65)
NEUTS BAND NFR BLD MANUAL: 16 % (ref 1–5)
NEUTS SEG NFR BLD MANUAL: 71 % (ref 50–62)
NRBC # BLD AUTO: 0.19 X10E3/UL
NRBC BLD MANUAL-RTO: 3 /100 WBC
NRBC, AUTO (.00): 1.7 %
OVALOCYTES BLD QL SMEAR: ABNORMAL
PHOSPHATE SERPL-MCNC: 2.3 MG/DL (ref 2.5–4.5)
PLATELET # BLD AUTO: 244 K/UL (ref 150–400)
PLATELET MORPHOLOGY: ABNORMAL
POTASSIUM SERPL-SCNC: 3.7 MMOL/L (ref 3.5–5.1)
RBC # BLD AUTO: 3.05 M/UL (ref 4.6–6.2)
SCHISTOCYTES BLD QL AUTO: ABNORMAL
SODIUM SERPL-SCNC: 139 MMOL/L (ref 136–145)
WBC # BLD AUTO: 10.98 K/UL (ref 4.5–11)

## 2024-10-13 PROCEDURE — 11000001 HC ACUTE MED/SURG PRIVATE ROOM

## 2024-10-13 PROCEDURE — 27000221 HC OXYGEN, UP TO 24 HOURS

## 2024-10-13 PROCEDURE — 85025 COMPLETE CBC W/AUTO DIFF WBC: CPT | Performed by: INTERNAL MEDICINE

## 2024-10-13 PROCEDURE — 36415 COLL VENOUS BLD VENIPUNCTURE: CPT | Performed by: INTERNAL MEDICINE

## 2024-10-13 PROCEDURE — 97161 PT EVAL LOW COMPLEX 20 MIN: CPT

## 2024-10-13 PROCEDURE — 63600175 PHARM REV CODE 636 W HCPCS: Performed by: INTERNAL MEDICINE

## 2024-10-13 PROCEDURE — 99900035 HC TECH TIME PER 15 MIN (STAT)

## 2024-10-13 PROCEDURE — S4991 NICOTINE PATCH NONLEGEND: HCPCS | Performed by: INTERNAL MEDICINE

## 2024-10-13 PROCEDURE — 80069 RENAL FUNCTION PANEL: CPT | Performed by: INTERNAL MEDICINE

## 2024-10-13 PROCEDURE — 94640 AIRWAY INHALATION TREATMENT: CPT

## 2024-10-13 PROCEDURE — 25000242 PHARM REV CODE 250 ALT 637 W/ HCPCS: Performed by: INTERNAL MEDICINE

## 2024-10-13 PROCEDURE — 94761 N-INVAS EAR/PLS OXIMETRY MLT: CPT

## 2024-10-13 PROCEDURE — 97166 OT EVAL MOD COMPLEX 45 MIN: CPT

## 2024-10-13 PROCEDURE — 25000003 PHARM REV CODE 250: Performed by: INTERNAL MEDICINE

## 2024-10-13 PROCEDURE — 99232 SBSQ HOSP IP/OBS MODERATE 35: CPT | Mod: ,,, | Performed by: INTERNAL MEDICINE

## 2024-10-13 RX ORDER — HYDROCODONE BITARTRATE AND ACETAMINOPHEN 10; 325 MG/1; MG/1
1 TABLET ORAL EVERY 8 HOURS PRN
Status: DISCONTINUED | OUTPATIENT
Start: 2024-10-13 | End: 2024-10-17 | Stop reason: HOSPADM

## 2024-10-13 RX ORDER — PREDNISONE 20 MG/1
40 TABLET ORAL DAILY
Status: DISCONTINUED | OUTPATIENT
Start: 2024-10-13 | End: 2024-10-17 | Stop reason: HOSPADM

## 2024-10-13 RX ORDER — GABAPENTIN 100 MG/1
100 CAPSULE ORAL 2 TIMES DAILY
Status: DISCONTINUED | OUTPATIENT
Start: 2024-10-13 | End: 2024-10-17 | Stop reason: HOSPADM

## 2024-10-13 RX ADMIN — ASPIRIN 81 MG: 81 TABLET, COATED ORAL at 08:10

## 2024-10-13 RX ADMIN — LEVOFLOXACIN 250 MG: 250 INJECTION, SOLUTION INTRAVENOUS at 11:10

## 2024-10-13 RX ADMIN — SEVELAMER CARBONATE 800 MG: 800 TABLET, FILM COATED ORAL at 08:10

## 2024-10-13 RX ADMIN — CYPROHEPTADINE HYDROCHLORIDE 4 MG: 4 TABLET ORAL at 09:10

## 2024-10-13 RX ADMIN — HYDROCODONE BITARTRATE AND ACETAMINOPHEN 1 TABLET: 10; 325 TABLET ORAL at 06:10

## 2024-10-13 RX ADMIN — ACETAMINOPHEN 650 MG: 325 TABLET ORAL at 08:10

## 2024-10-13 RX ADMIN — HEPARIN SODIUM 5000 UNITS: 5000 INJECTION, SOLUTION INTRAVENOUS; SUBCUTANEOUS at 09:10

## 2024-10-13 RX ADMIN — HYDROCODONE BITARTRATE AND ACETAMINOPHEN 1 TABLET: 10; 325 TABLET ORAL at 10:10

## 2024-10-13 RX ADMIN — TICAGRELOR 90 MG: 90 TABLET ORAL at 09:10

## 2024-10-13 RX ADMIN — PREDNISONE 40 MG: 20 TABLET ORAL at 08:10

## 2024-10-13 RX ADMIN — NICOTINE 1 PATCH: 14 PATCH, EXTENDED RELEASE TRANSDERMAL at 08:10

## 2024-10-13 RX ADMIN — IPRATROPIUM BROMIDE AND ALBUTEROL SULFATE 3 ML: 2.5; .5 SOLUTION RESPIRATORY (INHALATION) at 07:10

## 2024-10-13 RX ADMIN — SEVELAMER CARBONATE 800 MG: 800 TABLET, FILM COATED ORAL at 12:10

## 2024-10-13 RX ADMIN — MUPIROCIN: 20 OINTMENT TOPICAL at 08:10

## 2024-10-13 RX ADMIN — CARVEDILOL 6.25 MG: 6.25 TABLET, FILM COATED ORAL at 09:10

## 2024-10-13 RX ADMIN — CYPROHEPTADINE HYDROCHLORIDE 4 MG: 4 TABLET ORAL at 08:10

## 2024-10-13 RX ADMIN — HEPARIN SODIUM 5000 UNITS: 5000 INJECTION, SOLUTION INTRAVENOUS; SUBCUTANEOUS at 08:10

## 2024-10-13 RX ADMIN — BUDESONIDE 0.5 MG: 0.5 INHALANT RESPIRATORY (INHALATION) at 07:10

## 2024-10-13 RX ADMIN — ATORVASTATIN CALCIUM 80 MG: 80 TABLET, FILM COATED ORAL at 08:10

## 2024-10-13 RX ADMIN — CARVEDILOL 6.25 MG: 6.25 TABLET, FILM COATED ORAL at 08:10

## 2024-10-13 RX ADMIN — ROFLUMILAST 500 MCG: 500 TABLET ORAL at 08:10

## 2024-10-13 RX ADMIN — MUPIROCIN: 20 OINTMENT TOPICAL at 09:10

## 2024-10-13 RX ADMIN — GABAPENTIN 100 MG: 100 CAPSULE ORAL at 09:10

## 2024-10-13 RX ADMIN — TICAGRELOR 90 MG: 90 TABLET ORAL at 08:10

## 2024-10-13 RX ADMIN — IPRATROPIUM BROMIDE AND ALBUTEROL SULFATE 3 ML: 2.5; .5 SOLUTION RESPIRATORY (INHALATION) at 01:10

## 2024-10-13 RX ADMIN — SEVELAMER CARBONATE 800 MG: 800 TABLET, FILM COATED ORAL at 04:10

## 2024-10-13 RX ADMIN — METHYLPREDNISOLONE SODIUM SUCCINATE 40 MG: 40 INJECTION, POWDER, FOR SOLUTION INTRAMUSCULAR; INTRAVENOUS at 05:10

## 2024-10-13 NOTE — ASSESSMENT & PLAN NOTE
Appears to be confused, no focal deficits noted.  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  B12 and TSH WNL.   Improved.

## 2024-10-13 NOTE — PLAN OF CARE
Problem: Gas Exchange Impaired  Goal: Optimal Gas Exchange  Outcome: Progressing     Problem: Gas Exchange Impaired  Goal: Optimal Gas Exchange  Outcome: Progressing

## 2024-10-13 NOTE — PT/OT/SLP EVAL
Occupational Therapy   Evaluation    Name: Michoacano Damon  MRN: [id number]  Admitting Diagnosis: Chronic obstructive pulmonary disease with acute exacerbation  Recent Surgery: * No surgery found *      Recommendations:     Discharge Recommendations: Moderate Intensity Therapy  Discharge Equipment Recommendations:  none  Barriers to discharge:  None    Assessment:     Michoacano Damon is a 74 y.o. male with a medical diagnosis of Chronic obstructive pulmonary disease with acute exacerbation.  He presents with complaints of back pain. Pt agreed to OT evaluation. Performance deficits affecting function: impaired functional mobility, weakness, impaired endurance, impaired self care skills, impaired balance, gait instability, decreased safety awareness, impaired cognition, impaired coordination, decreased upper extremity function, decreased lower extremity function.      Rehab Prognosis: Good and for goals ; patient would benefit from acute skilled OT services to address these deficits and reach maximum level of function.       Plan:     Patient to be seen 5 x/week to address the above listed problems via self-care/home management, therapeutic activities, therapeutic exercises  Plan of Care Expires: 11/17/24  Plan of Care Reviewed with: patient    Subjective     Chief Complaint: Back pain  Patient/Family Comments/goals: To return to NH    Occupational Profile:  Living Environment: pt is a NH resident  Previous level of function: pt reports being able to assist with self care   Roles and Routines: NH resident assisted by staff  Equipment Used at Home: CPAP, hospital bed, oxygen, shower chair, wheelchair  Assistance upon Discharge: NH staff    Pain/Comfort:  Pain Rating 1: 8/10  Location 1: back  Pain Addressed 1: Pre-medicate for activity, Reposition, Nurse notified  Pain Rating Post-Intervention 1: 9/10    Patients cultural, spiritual, Anglican conflicts given the current situation: no    Objective:     Communicated with: TEDDY Martell  Cara prior to session.  Patient found HOB elevated with bed alarm, oxygen, peripheral IV, telemetry upon OT entry to room.    General Precautions: Standard, fall  Orthopedic Precautions: N/A  Braces: N/A  Respiratory Status: Room air    Occupational Performance:    Bed Mobility:    Patient completed Rolling/Turning to Right with contact guard assistance  Patient completed Supine to Sit with contact guard assistance  Patient completed Sit to Supine with minimum assistance    Functional Mobility/Transfers:  Patient completed Sit <> Stand Transfer with moderate assistance and of 2 persons  with  gait belt and manual assistance   Functional Mobility: Mod a x 2 to side step to HOB x approx 2 steps with manual assistance and gait belt    Activities of Daily Living:  Upper Body Dressing: maximal assistance donning gown as a robe    Cognitive/Visual Perceptual:  Cognitive/Psychosocial Skills:     -       Oriented to: Person   -       Follows Commands/attention:Follows one-step commands and with cueing  -       Communication: WFL  Visual/Perceptual:      -WFL     Physical Exam:  Balance:    -       SBA/CGA with EOB sitting due to back pain, Mod a x 2 with standing balance with manual assistance  Skin integrity: Bruising of arms  Edema:  None noted  Sensation:    -       Intact  Motor Planning:    -       wfl  Dominant hand:    -       Right  Upper Extremity Range of Motion:     -       Right Upper Extremity: WFL  -       Left Upper Extremity: WFL  Upper Extremity Strength:    -       Right Upper Extremity: WFL  -       Left Upper Extremity: WFL   Strength:    -       Right Upper Extremity: WFL  -       Left Upper Extremity: WFL    AMPAC 6 Click ADL:  AMPAC Total Score: 14    Treatment & Education:  OT evaluation completed. Pt is a NH resident and reports being able to assist with self care and mobility prior. Tx plan to focus on increasing (I) with ADLs and transfers.  Pt educated on OT role/POC.   Importance of OOB  activity..  Importance of sitting up in chair.  Safety during functional t/f and mobility with use of AD as needed  Importance of assisting with self-care activities   All questions/concerns answered within OT scope of practice     Patient left HOB elevated with all lines intact, call button in reach, and nurse notified    GOALS:   Multidisciplinary Problems       Occupational Therapy Goals          Problem: Occupational Therapy    Goal Priority Disciplines Outcome Interventions   Occupational Therapy Goal     OT, PT/OT Progressing    Description: STG:  Pt will perform grooming with setup  Pt will bathe with min a  Pt will perform UE dressing with min a  Pt will perform LE dressing with min a  Pt will sit EOB x 10 min with SBA  Pt will transfer bed/chair/bsc with min a  Pt will tolerate 20 minutes of tx without fatigue      LT.Restore to max I with self care and mobility.                          History:     Past Medical History:   Diagnosis Date    Anticoagulant long-term use     COPD (chronic obstructive pulmonary disease)     Coronary artery disease     ESRD on dialysis     HFrEF (heart failure with reduced ejection fraction)     Hypertension          Past Surgical History:   Procedure Laterality Date    ANGIOGRAM, CORONARY, WITH LEFT HEART CATHETERIZATION N/A 2024    Procedure: Angiogram, Coronary, with Left Heart Cath;  Surgeon: Oziel Santos MD;  Location: [facility] CATH LAB;  Service: Cardiology;  Laterality: N/A;    AV FISTULA PLACEMENT, BRACHIOCEPHALIC      INSERTION OF DIALYSIS CATHETER         Time Tracking:     OT Date of Treatment: 10/13/24  OT Start Time: 921  OT Stop Time: 937  OT Total Time (min): 16 min    Billable Minutes:Evaluation 16 Moderate Complexity    10/13/2024

## 2024-10-13 NOTE — NURSING
Called into room at approx 0725 by shannan burroughs that patient found lying across side of bed with knees on the floor. He awake and alert no distress noted. Says he needed to have a bowel movement. Pt placed back into bed by 3 staff. Tolertated transfer. Vital obtained. Skin assessment complete no injury noted. Bed low wheels locked bed alarm on. Instructed to call for assisstance. Dr. Figueroa notifed order to place tele sitter. Pt daughter notified and updated on event. No further questions.

## 2024-10-13 NOTE — ASSESSMENT & PLAN NOTE
Patient's COPD is with exacerbation noted by continued dyspnea and use of accessory muscles for breathing currently.  Patient is currently off COPD Pathway. Continue scheduled inhalers Steroids, Antibiotics, and Supplemental Oxygen and monitor respiratory status closely.   Switch to prednisone.   Patient wears 2L NC at baseline and oxygenation has been stable.

## 2024-10-13 NOTE — ASSESSMENT & PLAN NOTE
"Patient has Combined Systolic and Diastolic heart failure that is Acute on chronic. On presentation their CHF was decompensated. Evidence of decompensated CHF on presentation includes: crackles on lung auscultation and shortness of breath. The etiology of their decompensation is likely dietary indiscretion. Most recent BNP and echo results are listed below.  No results for input(s): "BNP" in the last 72 hours.  Latest ECHO  Results for orders placed during the hospital encounter of 08/25/24    Echo    Interpretation Summary    Left Ventricle: The left ventricle is smaller than normal. There is severe concentric hypertrophy. Regional wall motion abnormalities present.  Inferior wall hypokinesis is noted There is mildly reduced systolic function with a visually estimated ejection fraction of 40 - 50%. Ejection fraction by visual approximation is 45%. There is diastolic dysfunction.    Right Ventricle: Normal right ventricular cavity size.    Aortic Valve: The aortic valve is a trileaflet valve. Mildly calcified cusps.    Tricuspid Valve: There is mild regurgitation. There is mild pulmonary hypertension.    IVC/SVC: Normal venous pressure at 3 mmHg.    Pericardium: There is a trivial effusion.    Current Heart Failure Medications  carvediloL tablet 6.25 mg, 2 times daily, Oral    Plan  - Monitor strict I&Os and daily weights.    - Place on telemetry  - Low sodium diet  - Place on fluid restriction of 1 L.   - Volume removal with dialysis.   "

## 2024-10-13 NOTE — PT/OT/SLP EVAL
Physical Therapy Evaluation     Patient Name: Michoacano Damon   MRN: [id number]  Recent Surgery: * No surgery found *      Recommendations:     Discharge Recommendations: Moderate Intensity Therapy   Discharge Equipment Recommendations: none   Barriers to discharge: Increased level of assist and Ongoing medical treatment    Assessment:     Michoacano Damon is a 74 y.o. male admitted with a medical diagnosis of Chronic obstructive pulmonary disease with acute exacerbation. He presents with the following impairments/functional limitations: weakness, impaired endurance, impaired functional mobility, gait instability, impaired balance, decreased lower extremity function, decreased safety awareness, pain. Pt demo better performance with mobility than anticipated. Pt is able to complete transfers but is presently most limited by back pain. Pt would benefit from PT while hospitalized as well as when he returns to [facility].      Rehab Prognosis: Good and Fair; patient would benefit from acute PT services to address these deficits and reach maximum level of function.    Plan:     During this hospitalization, patient to be seen 5 x/week to address the above listed problems via gait training, therapeutic activities, therapeutic exercises, neuromuscular re-education    Plan of Care Expires: 11/13/24    Subjective     Chief Complaint: COPD  Patient Comments/Goals: agreeable to eval  Pain/Comfort:  Pain Rating 1: 8/10  Location 1: back  Pain Addressed 1: Reposition, Pre-medicate for activity, Nurse notified  Pain Rating Post-Intervention 1: 9/10    Social History:  Living Environment: Patient lives in a skilled nursing facility  Prior Level of Function: Prior to admission, patient  has not ambulated in 2-3 months but was able to transfer with assistance in NH  Equipment Used at Home: wheelchair, oxygen, CPAP, hospital bed, shower chair  DME owned (not currently used): none  Assistance Upon Discharge: facility  staff    Objective:     Communicated with RN prior to session. Patient found right sidelying with oxygen, bed alarm, peripheral IV, telemetry upon PT entry to room.    General Precautions: Standard, fall   Orthopedic Precautions: N/A   Braces: N/A    Respiratory Status: Nasal cannula, flow 3 L/min    Exams:  Cognition: Patient is oriented to Person and Place  RLE ROM: WFL  RLE Strength: Deficits: 3+/5  LLE ROM: WFL  LLE Strength: Deficits: 3+/5  Sensation: -       Intact    Functional Mobility:  Gait belt applied - Yes  Bed Mobility  Supine to Sit: contact guard assistance   Sit to Supine: minimum assistance for LE management  Transfers  Sit to Stand: moderate assistance and of 2 persons with hand-held assist and with cues for hand placement and foot placement  Gait  Patient ambulated 1-2 sidesteps to HOB with hand-held assist and moderate assistance and of 2 persons. Patient demonstrates unsteady gait, decreased step length, narrow base of support, decreased weight shift, decreased foot clearance, and flexed posture. . All lines remained intact throughout ambulation trail.  Balance  Sitting: stand by assistance  Standing: moderate assistance and of 2 persons      Therapeutic Activities and Exercises:   Patient educated on role of acute care PT and PT POC, safety while in hospital including calling nurse for mobility, and call light usage  Patient educated about importance of OOB mobility and remaining up in chair most of the day.  Patient educated about pursed lip breathing technique and cued for use with mobility.      AM-PAC 6 CLICK MOBILITY  Total Score:12    Patient left right sidelying with HOB elevated with all lines intact, call button in reach, RN notified, and student nurse present.    GOALS:   Multidisciplinary Problems       Physical Therapy Goals          Problem: Physical Therapy    Goal Priority Disciplines Outcome Interventions   Physical Therapy Goal     PT, PT/OT Progressing    Description: Short  Term Goals to be met by: 10/27/24    Patient will increase functional independence with mobility by performin. Supine to sit with Stand by assist  2. Sit to stand transfer with Minimal Assist using Rolling walker  3. Bed to chair transfer with Minimal Assist using Rolling walker  4. Gait  x 10 feet with Moderate Assist using Rolling walker  5. Lower extremity exercise program x30 reps per handout, with assistance as needed    Long Term Goals to be met by: 24    Pt will regain full independent functional mobility with Rolling walker to return to home situation and prior activities of daily living.                        History:     Past Medical History:   Diagnosis Date    Anticoagulant long-term use     COPD (chronic obstructive pulmonary disease)     Coronary artery disease     ESRD on dialysis     HFrEF (heart failure with reduced ejection fraction)     Hypertension        Past Surgical History:   Procedure Laterality Date    ANGIOGRAM, CORONARY, WITH LEFT HEART CATHETERIZATION N/A 2024    Procedure: Angiogram, Coronary, with Left Heart Cath;  Surgeon: Oziel Santos MD;  Location: [facility] CATH LAB;  Service: Cardiology;  Laterality: N/A;    AV FISTULA PLACEMENT, BRACHIOCEPHALIC      INSERTION OF DIALYSIS CATHETER         Time Tracking:     PT Received On: 10/13/24  PT Start Time: 920  PT Stop Time: 937  PT Total Time (min): 17 min     Billable Minutes: Evaluation low complexity    10/13/2024

## 2024-10-13 NOTE — ASSESSMENT & PLAN NOTE
Anemia is likely due to chronic disease due to ESRD. Most recent hemoglobin and hematocrit are listed below.    Recent Labs     10/11/24  1705 10/11/24  1706 10/12/24  0420 10/13/24  0617   HGB 10.6*  --  9.2* 9.0*   HCT 36.3* 33* 30.5* 29.0*       Plan  - Monitor serial CBC: Daily  - Transfuse PRBC if patient becomes hemodynamically unstable, symptomatic or H/H drops below 7/21.  - Patient has not received any PRBC transfusions to date  - Given macrocytosis, will check TSH and B12/folate.

## 2024-10-13 NOTE — PLAN OF CARE
Problem: Occupational Therapy  Goal: Occupational Therapy Goal  Description: STG:  Pt will perform grooming with setup  Pt will bathe with min a  Pt will perform UE dressing with min a  Pt will perform LE dressing with min a  Pt will sit EOB x 10 min with SBA  Pt will transfer bed/chair/bsc with min a  Pt will tolerate 20 minutes of tx without fatigue      LT.Restore to max I with self care and mobility.     Outcome: Progressing

## 2024-10-13 NOTE — NURSING
Patient removed SCDs. Explained to patient the benefits of wearing the device. Patient refused to put them back on. SCD machine and leg sleeves are at bedside.

## 2024-10-13 NOTE — PROGRESS NOTES
[facility] Hospital Medicine  Progress Note    Patient Name: Michoacano Damon  MRN: [id number]  Patient Class: IP- Inpatient   Admission Date: 10/11/2024  Length of Stay: 2 days  Attending Physician: Joel Sellers MD  Primary Care Provider: Norma, Primary Doctor        Subjective:     Principal Problem:Chronic obstructive pulmonary disease with acute exacerbation        HPI:  Patient is a 74-year-old male with a history of chronic combined heart failure with last known EF of 45% in the setting of a known CAD status post PCI with stent placement of LAD and Lcx, essential hypertension, end-stage renal disease on hemodialysis on Monday Wednesdays and Fridays and oxygen-dependent COPD who presented to the emergency room with a chief complaint of shortness of breath accompanied by nonproductive cough and wheezing. Patient stated that shortness breath started doing dialysis session today.  Patient otherwise denied any associated symptoms such as fever, chills, chest pain palpitation PND orthopnea or lower extremity edema.     On initial presentation patient was tachycardic but vital signs were otherwise stable and patient was afebrile.  Workup was notable for presence of mild interstitial edema seen on chest x-ray in the setting of a mildly elevated troponin levels 171-->191 without any ischemic abnormalities seen on EKG, macrocytic anemia with a hemoglobin in the 10 g range which is his baseline, but otherwise unremarkable for someone with end-stage renal disease on HD.  Physical exam was notable for a presence of diffuse rhonchi with wheezing but otherwise unremarkable without any peripheral edema.  Patient will be admitted with a working diagnosis of acute on chronic combined heart failure along with acute exacerbation of COPD secondary to the former         Overview/Hospital Course:  10/12- Resp status is stable, BP noted to be low. Awaiting nephrology recommendations with regards to  HD. Continue IV steroids, antibiotics, bronchodilator.   10/13- s/p HD yesterday, improving respiratory status. Changed IV to PO steroids, resume home [place] and gabapentin. PT/OT consulted.     Interval History: Patient seen and examined at the bedside, reports breathing better, appears back to baseline mental status. Unwitnessed fall, patient was noted to be kneeling over on the floor bent over on the bed. Reports chronic back pain, denies hitting head or falling on back.     Review of Systems   Respiratory:  Positive for shortness of breath.    All other systems reviewed and are negative.    Objective:     Vital Signs (Most Recent):  Temp: 98.8 °F (37.1 °C) (10/13/24 0704)  Pulse: 102 (10/13/24 0735)  Resp: 20 (10/13/24 0735)  BP: (!) 148/93 (10/13/24 0727)  SpO2: 97 % (10/13/24 0735) Vital Signs (24h Range):  Temp:  [98 °F (36.7 °C)-98.8 °F (37.1 °C)] 98.8 °F (37.1 °C)  Pulse:  [] 102  Resp:  [16-22] 20  SpO2:  [97 %-100 %] 97 %  BP: ()/(32-94) 148/93     Weight: 71.7 kg (158 lb)  Body mass index is 22.04 kg/m².    Intake/Output Summary (Last 24 hours) at 10/13/2024 1012  Last data filed at 10/12/2024 1307  Gross per 24 hour   Intake --   Output 2200 ml   Net -2200 ml         Physical Exam  Vitals and nursing note reviewed.   Constitutional:       General: He is awake.      Appearance: He is ill-appearing.   HENT:      Head: Normocephalic and atraumatic.      Nose: Nose normal.      Mouth/Throat:      Mouth: Mucous membranes are moist.   Eyes:      Extraocular Movements: Extraocular movements intact.   Cardiovascular:      Rate and Rhythm: Normal rate and regular rhythm.      Pulses: Normal pulses.      Heart sounds: Normal heart sounds.   Pulmonary:      Effort: Pulmonary effort is normal.      Breath sounds: Wheezing present.   Abdominal:      General: Bowel sounds are normal.      Palpations: Abdomen is soft.   Musculoskeletal:         General: Normal range of motion.      Cervical back: Normal  range of motion.      Right lower leg: Edema present.      Left lower leg: Edema present.   Skin:     General: Skin is warm.   Neurological:      General: No focal deficit present.      Mental Status: He is alert. Mental status is at baseline. He is disoriented.   Psychiatric:         Mood and Affect: Mood normal.         Behavior: Behavior normal.             Significant Labs: All pertinent labs within the past 24 hours have been reviewed.    Significant Imaging: I have reviewed all pertinent imaging results/findings within the past 24 hours.    Assessment/Plan:      * Chronic obstructive pulmonary disease with acute exacerbation  Patient's COPD is with exacerbation noted by continued dyspnea and use of accessory muscles for breathing currently.  Patient is currently off COPD Pathway. Continue scheduled inhalers Steroids, Antibiotics, and Supplemental Oxygen and monitor respiratory status closely.   Switch to prednisone.   Patient wears 2L NC at baseline and oxygenation has been stable.     Type 2 MI (myocardial infarction)  Patient has had elevated troponin levels 171-->191-->175 without any ischemic abnormalities seen on EKG.    Patient is also completely asymptomatic and as such I feel the patient has type 2 MI secondary to acute decompensation of chronic combined heart failure.  Monitor for chest pain, evaluate further if develops concerning symptoms.       Acute metabolic encephalopathy  Appears to be confused, no focal deficits noted.  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  B12 and TSH WNL.   Improved.       Acute on chronic combined systolic and diastolic congestive heart failure  Patient has Combined Systolic and Diastolic heart failure that is Acute on chronic. On presentation their CHF was decompensated. Evidence of decompensated CHF on presentation includes: crackles on lung auscultation and shortness of breath. The etiology of their decompensation is likely dietary indiscretion. Most recent BNP and echo results are  "listed below.  No results for input(s): "BNP" in the last 72 hours.  Latest ECHO  Results for orders placed during the hospital encounter of 08/25/24    Echo    Interpretation Summary    Left Ventricle: The left ventricle is smaller than normal. There is severe concentric hypertrophy. Regional wall motion abnormalities present.  Inferior wall hypokinesis is noted There is mildly reduced systolic function with a visually estimated ejection fraction of 40 - 50%. Ejection fraction by visual approximation is 45%. There is diastolic dysfunction.    Right Ventricle: Normal right ventricular cavity size.    Aortic Valve: The aortic valve is a trileaflet valve. Mildly calcified cusps.    Tricuspid Valve: There is mild regurgitation. There is mild pulmonary hypertension.    IVC/SVC: Normal venous pressure at 3 mmHg.    Pericardium: There is a trivial effusion.    Current Heart Failure Medications  carvediloL tablet 6.25 mg, 2 times daily, Oral    Plan  - Monitor strict I&Os and daily weights.    - Place on telemetry  - Low sodium diet  - Place on fluid restriction of 1 L.   - Volume removal with dialysis.     ESRD (end stage renal disease)  On M/W/F dilaysis, missed session on date of admission as he got ill/short of breath (10/11/24).   Nephrology consulted to resume dialysis.     Idiopathic hypotension  BP 90/50's.  Lactate WNL.   Blood cultures sent in ED, no evidence of infection noted yet, no fever, normal WBC count.   Off hydralazine per med review, resume Coreg with caution.   Stable pressures now.       Chronic midline back pain  Resume home [place] and gabapentin.       Debility  Patient with Acute on chronic debility due to age-related physical debility and co morbid conditions . The patient's latest AMPAC (Activity Measure for Post Acute Care) Score is listed below.    AM-PAC Score - How much help does the patient need for each activity listed  Basic Mobility Total Score: 6  Turning over in bed (including adjusting " bedclothes, sheets and blankets)?: Unable  Sitting down on and standing up from a chair with arms (e.g., wheelchair, bedside commode, etc.): Unable  Moving from lying on back to sitting on the side of the bed?: Unable  Moving to and from a bed to a chair (including a wheelchair)?: Unable  Need to walk in hospital room?: Unable  Climbing 3-5 steps with a railing?: Unable    Plan  - Progressive mobility protocol initated  - PT/OT consulted  - Fall precautions in place  - Resides at [facility] and plans to return.           Tobacco dependency  Dangers of cigarette smoking were reviewed with patient in detail. Patient was Counseled for 3-10 minutes. Nicotine replacement options were discussed. Nicotine replacement was discussed- prescribed    Anemia of renal disease  Anemia is likely due to chronic disease due to ESRD. Most recent hemoglobin and hematocrit are listed below.    Recent Labs     10/11/24  1705 10/11/24  1706 10/12/24  0420 10/13/24  0617   HGB 10.6*  --  9.2* 9.0*   HCT 36.3* 33* 30.5* 29.0*       Plan  - Monitor serial CBC: Daily  - Transfuse PRBC if patient becomes hemodynamically unstable, symptomatic or H/H drops below 7/21.  - Patient has not received any PRBC transfusions to date  - Given macrocytosis, will check TSH and B12/folate.       Acute pulmonary edema  Sec to ESRD, hypervolemia and CHF exacerbation.  Management as above.       CAD (coronary artery disease)  Patient with known CAD s/p stent placement which is controlled Will continue ASA, Brillinta, and Statin and monitor for S/Sx of angina/ACS. Continue to monitor on telemetry.     Hypertension  Patients blood pressure range in the last 24 hours was: BP  Min: 81/39  Max: 154/94.The patient's inpatient anti-hypertensive regimen is listed below:  Current Antihypertensives  carvediloL tablet 6.25 mg, 2 times daily, Oral    Plan  - BP is uncontrolled, will adjust as follows: hypotension noted, cautious with Coreg  - Off hydralazine per medication  review.        VTE Risk Mitigation (From admission, onward)           Ordered     heparin (porcine) injection 5,000 Units  Every 12 hours         10/11/24 2350     IP VTE HIGH RISK PATIENT  Once         10/11/24 2350     Place sequential compression device  Until discontinued         10/11/24 2350                    Discharge Planning   MARCIAL: 10/15/2024     Code Status: Full Code   Is the patient medically ready for discharge?:     Reason for patient still in hospital (select all that apply): Patient trending condition  Discharge Plan A: Return to nursing home                  LEV HERNANDEZ MD  Department of Hospital Medicine   [facility]

## 2024-10-13 NOTE — SUBJECTIVE & OBJECTIVE
Interval History: Patient seen and examined at the bedside, reports breathing better, appears back to baseline mental status. Unwitnessed fall, patient was noted to be kneeling over on the floor bent over on the bed. Reports chronic back pain, denies hitting head or falling on back.     Review of Systems   Respiratory:  Positive for shortness of breath.    All other systems reviewed and are negative.    Objective:     Vital Signs (Most Recent):  Temp: 98.8 °F (37.1 °C) (10/13/24 0704)  Pulse: 102 (10/13/24 0735)  Resp: 20 (10/13/24 0735)  BP: (!) 148/93 (10/13/24 0727)  SpO2: 97 % (10/13/24 0735) Vital Signs (24h Range):  Temp:  [98 °F (36.7 °C)-98.8 °F (37.1 °C)] 98.8 °F (37.1 °C)  Pulse:  [] 102  Resp:  [16-22] 20  SpO2:  [97 %-100 %] 97 %  BP: ()/(32-94) 148/93     Weight: 71.7 kg (158 lb)  Body mass index is 22.04 kg/m².    Intake/Output Summary (Last 24 hours) at 10/13/2024 1012  Last data filed at 10/12/2024 1307  Gross per 24 hour   Intake --   Output 2200 ml   Net -2200 ml         Physical Exam  Vitals and nursing note reviewed.   Constitutional:       General: He is awake.      Appearance: He is ill-appearing.   HENT:      Head: Normocephalic and atraumatic.      Nose: Nose normal.      Mouth/Throat:      Mouth: Mucous membranes are moist.   Eyes:      Extraocular Movements: Extraocular movements intact.   Cardiovascular:      Rate and Rhythm: Normal rate and regular rhythm.      Pulses: Normal pulses.      Heart sounds: Normal heart sounds.   Pulmonary:      Effort: Pulmonary effort is normal.      Breath sounds: Wheezing present.   Abdominal:      General: Bowel sounds are normal.      Palpations: Abdomen is soft.   Musculoskeletal:         General: Normal range of motion.      Cervical back: Normal range of motion.      Right lower leg: Edema present.      Left lower leg: Edema present.   Skin:     General: Skin is warm.   Neurological:      General: No focal deficit present.      Mental  Status: He is alert. Mental status is at baseline. He is disoriented.   Psychiatric:         Mood and Affect: Mood normal.         Behavior: Behavior normal.             Significant Labs: All pertinent labs within the past 24 hours have been reviewed.    Significant Imaging: I have reviewed all pertinent imaging results/findings within the past 24 hours.

## 2024-10-13 NOTE — PLAN OF CARE
Problem: Physical Therapy  Goal: Physical Therapy Goal  Description: Short Term Goals to be met by: 10/27/24    Patient will increase functional independence with mobility by performin. Supine to sit with Stand by assist  2. Sit to stand transfer with Minimal Assist using Rolling walker  3. Bed to chair transfer with Minimal Assist using Rolling walker  4. Gait  x 10 feet with Moderate Assist using Rolling walker  5. Lower extremity exercise program x30 reps per handout, with assistance as needed    Long Term Goals to be met by: 24    Pt will regain full independent functional mobility with Rolling walker to return to home situation and prior activities of daily living.   Outcome: Progressing    PT eval completed. Please see eval note for details.

## 2024-10-13 NOTE — ASSESSMENT & PLAN NOTE
BP 90/50's.  Lactate WNL.   Blood cultures sent in ED, no evidence of infection noted yet, no fever, normal WBC count.   Off hydralazine per med review, resume Coreg with caution.   Stable pressures now.

## 2024-10-13 NOTE — ASSESSMENT & PLAN NOTE
Patient with Acute on chronic debility due to age-related physical debility and co morbid conditions . The patient's latest AMPAC (Activity Measure for Post Acute Care) Score is listed below.    AM-PAC Score - How much help does the patient need for each activity listed  Basic Mobility Total Score: 6  Turning over in bed (including adjusting bedclothes, sheets and blankets)?: Unable  Sitting down on and standing up from a chair with arms (e.g., wheelchair, bedside commode, etc.): Unable  Moving from lying on back to sitting on the side of the bed?: Unable  Moving to and from a bed to a chair (including a wheelchair)?: Unable  Need to walk in hospital room?: Unable  Climbing 3-5 steps with a railing?: Unable    Plan  - Progressive mobility protocol initated  - PT/OT consulted  - Fall precautions in place  - Resides at [facility] and plans to return.          show

## 2024-10-13 NOTE — PLAN OF CARE
Problem: Adult Inpatient Plan of Care  Goal: Plan of Care Review  Outcome: Progressing  Goal: Patient-Specific Goal (Individualized)  Outcome: Progressing  Goal: Absence of Hospital-Acquired Illness or Injury  Outcome: Progressing  Goal: Optimal Comfort and Wellbeing  Outcome: Progressing  Goal: Readiness for Transition of Care  Outcome: Progressing     Problem: Skin Injury Risk Increased  Goal: Skin Health and Integrity  Outcome: Progressing     Problem: Hemodialysis  Goal: Safe, Effective Therapy Delivery  Outcome: Progressing  Goal: Effective Tissue Perfusion  Outcome: Progressing  Goal: Absence of Infection Signs and Symptoms  Outcome: Progressing     Problem: Gas Exchange Impaired  Goal: Optimal Gas Exchange  Outcome: Progressing     Problem: Airway Clearance Ineffective  Goal: Effective Airway Clearance  Outcome: Progressing

## 2024-10-13 NOTE — ASSESSMENT & PLAN NOTE
Patients blood pressure range in the last 24 hours was: BP  Min: 81/39  Max: 154/94.The patient's inpatient anti-hypertensive regimen is listed below:  Current Antihypertensives  carvediloL tablet 6.25 mg, 2 times daily, Oral    Plan  - BP is uncontrolled, will adjust as follows: hypotension noted, cautious with Coreg  - Off hydralazine per medication review.

## 2024-10-14 PROBLEM — I50.43 ACUTE ON CHRONIC COMBINED SYSTOLIC AND DIASTOLIC CONGESTIVE HEART FAILURE: Status: RESOLVED | Noted: 2021-03-19 | Resolved: 2024-10-14

## 2024-10-14 PROBLEM — R68.84 JAW PAIN: Status: RESOLVED | Noted: 2024-08-27 | Resolved: 2024-10-14

## 2024-10-14 PROBLEM — I21.A1 TYPE 2 MI (MYOCARDIAL INFARCTION): Status: RESOLVED | Noted: 2024-08-25 | Resolved: 2024-10-14

## 2024-10-14 PROBLEM — D63.1 ANEMIA OF RENAL DISEASE: Status: RESOLVED | Noted: 2024-08-28 | Resolved: 2024-10-14

## 2024-10-14 PROBLEM — G93.41 ACUTE METABOLIC ENCEPHALOPATHY: Status: RESOLVED | Noted: 2024-10-12 | Resolved: 2024-10-14

## 2024-10-14 PROBLEM — E87.5 HYPERKALEMIA: Status: RESOLVED | Noted: 2024-08-26 | Resolved: 2024-10-14

## 2024-10-14 PROBLEM — I95.0 IDIOPATHIC HYPOTENSION: Status: RESOLVED | Noted: 2024-10-12 | Resolved: 2024-10-14

## 2024-10-14 PROBLEM — G89.29 CHRONIC MIDLINE BACK PAIN: Status: RESOLVED | Noted: 2024-10-13 | Resolved: 2024-10-14

## 2024-10-14 PROBLEM — F17.200 TOBACCO DEPENDENCY: Status: RESOLVED | Noted: 2024-10-12 | Resolved: 2024-10-14

## 2024-10-14 PROBLEM — R94.30 WALL MOTION ABNORMALITY OF INFERIOR WALL OF LEFT VENTRICLE: Status: RESOLVED | Noted: 2024-08-27 | Resolved: 2024-10-14

## 2024-10-14 PROBLEM — I10 HYPERTENSION: Status: RESOLVED | Noted: 2021-03-19 | Resolved: 2024-10-14

## 2024-10-14 PROBLEM — J96.02 ACUTE RESPIRATORY FAILURE WITH HYPOXIA AND HYPERCARBIA: Status: RESOLVED | Noted: 2024-08-26 | Resolved: 2024-10-14

## 2024-10-14 PROBLEM — B37.0 THRUSH: Status: RESOLVED | Noted: 2024-08-28 | Resolved: 2024-10-14

## 2024-10-14 PROBLEM — D69.6 THROMBOCYTOPENIA: Status: RESOLVED | Noted: 2024-08-28 | Resolved: 2024-10-14

## 2024-10-14 PROBLEM — I77.0 AV (ARTERIOVENOUS FISTULA): Status: RESOLVED | Noted: 2021-11-04 | Resolved: 2024-10-14

## 2024-10-14 PROBLEM — N18.9 ANEMIA OF RENAL DISEASE: Status: RESOLVED | Noted: 2024-08-28 | Resolved: 2024-10-14

## 2024-10-14 PROBLEM — J96.01 ACUTE RESPIRATORY FAILURE WITH HYPOXIA AND HYPERCARBIA: Status: RESOLVED | Noted: 2024-08-26 | Resolved: 2024-10-14

## 2024-10-14 PROBLEM — I25.10 CAD (CORONARY ARTERY DISEASE): Status: RESOLVED | Noted: 2021-03-19 | Resolved: 2024-10-14

## 2024-10-14 PROBLEM — M54.9 CHRONIC MIDLINE BACK PAIN: Status: RESOLVED | Noted: 2024-10-13 | Resolved: 2024-10-14

## 2024-10-14 PROBLEM — N18.6 ESRD (END STAGE RENAL DISEASE): Status: RESOLVED | Noted: 2021-03-19 | Resolved: 2024-10-14

## 2024-10-14 PROBLEM — R53.81 DEBILITY: Status: RESOLVED | Noted: 2024-10-12 | Resolved: 2024-10-14

## 2024-10-14 LAB
ACANTHOCYTES BLD QL SMEAR: ABNORMAL
ALBUMIN SERPL BCP-MCNC: 1.9 G/DL (ref 3.5–5)
ANION GAP SERPL CALCULATED.3IONS-SCNC: 8 MMOL/L (ref 7–16)
ANISOCYTOSIS BLD QL SMEAR: ABNORMAL
BASOPHILS # BLD AUTO: 0.04 K/UL (ref 0–0.2)
BASOPHILS NFR BLD AUTO: 0.3 % (ref 0–1)
BUN SERPL-MCNC: 37 MG/DL (ref 7–18)
BUN/CREAT SERPL: 8 (ref 6–20)
CALCIUM SERPL-MCNC: 8.2 MG/DL (ref 8.5–10.1)
CHLORIDE SERPL-SCNC: 106 MMOL/L (ref 98–107)
CO2 SERPL-SCNC: 28 MMOL/L (ref 21–32)
CREAT SERPL-MCNC: 4.9 MG/DL (ref 0.7–1.3)
CRENATED CELLS: ABNORMAL
DIFFERENTIAL METHOD BLD: ABNORMAL
EGFR (NO RACE VARIABLE) (RUSH/TITUS): 12 ML/MIN/1.73M2
EOSINOPHIL # BLD AUTO: 0 K/UL (ref 0–0.5)
EOSINOPHIL NFR BLD AUTO: 0 % (ref 1–4)
ERYTHROCYTE [DISTWIDTH] IN BLOOD BY AUTOMATED COUNT: 18.9 % (ref 11.5–14.5)
GLUCOSE SERPL-MCNC: 105 MG/DL (ref 74–106)
HCT VFR BLD AUTO: 30 % (ref 40–54)
HGB BLD-MCNC: 9 G/DL (ref 13.5–18)
IMM GRANULOCYTES # BLD AUTO: 0.57 K/UL (ref 0–0.04)
IMM GRANULOCYTES NFR BLD: 4.5 % (ref 0–0.4)
LYMPHOCYTES # BLD AUTO: 0.78 K/UL (ref 1–4.8)
LYMPHOCYTES NFR BLD AUTO: 6.1 % (ref 27–41)
LYMPHOCYTES NFR BLD MANUAL: 5 % (ref 27–41)
MCH RBC QN AUTO: 29.3 PG (ref 27–31)
MCHC RBC AUTO-ENTMCNC: 30 G/DL (ref 32–36)
MCV RBC AUTO: 97.7 FL (ref 80–96)
METAMYELOCYTES NFR BLD MANUAL: 1 %
MONOCYTES # BLD AUTO: 1.15 K/UL (ref 0–0.8)
MONOCYTES NFR BLD AUTO: 9.1 % (ref 2–6)
MONOCYTES NFR BLD MANUAL: 7 % (ref 2–6)
MPC BLD CALC-MCNC: 9.8 FL (ref 9.4–12.4)
NEUTROPHILS # BLD AUTO: 10.16 K/UL (ref 1.8–7.7)
NEUTROPHILS NFR BLD AUTO: 80 % (ref 53–65)
NEUTS BAND NFR BLD MANUAL: 4 % (ref 1–5)
NEUTS SEG NFR BLD MANUAL: 83 % (ref 50–62)
NRBC # BLD AUTO: 0.46 X10E3/UL
NRBC BLD MANUAL-RTO: 6 /100 WBC
NRBC, AUTO (.00): 3.6 %
OVALOCYTES BLD QL SMEAR: ABNORMAL
PHOSPHATE SERPL-MCNC: 2.4 MG/DL (ref 2.5–4.5)
PLATELET # BLD AUTO: 215 K/UL (ref 150–400)
PLATELET MORPHOLOGY: NORMAL
POLYCHROMASIA BLD QL SMEAR: ABNORMAL
POTASSIUM SERPL-SCNC: 4.1 MMOL/L (ref 3.5–5.1)
RBC # BLD AUTO: 3.07 M/UL (ref 4.6–6.2)
SODIUM SERPL-SCNC: 138 MMOL/L (ref 136–145)
WBC # BLD AUTO: 12.7 K/UL (ref 4.5–11)

## 2024-10-14 PROCEDURE — 97110 THERAPEUTIC EXERCISES: CPT

## 2024-10-14 PROCEDURE — 97110 THERAPEUTIC EXERCISES: CPT | Mod: CQ

## 2024-10-14 PROCEDURE — 63600175 PHARM REV CODE 636 W HCPCS: Performed by: INTERNAL MEDICINE

## 2024-10-14 PROCEDURE — S4991 NICOTINE PATCH NONLEGEND: HCPCS | Performed by: INTERNAL MEDICINE

## 2024-10-14 PROCEDURE — 90935 HEMODIALYSIS ONE EVALUATION: CPT

## 2024-10-14 PROCEDURE — 80069 RENAL FUNCTION PANEL: CPT | Performed by: INTERNAL MEDICINE

## 2024-10-14 PROCEDURE — 25000003 PHARM REV CODE 250: Performed by: INTERNAL MEDICINE

## 2024-10-14 PROCEDURE — 85025 COMPLETE CBC W/AUTO DIFF WBC: CPT | Performed by: INTERNAL MEDICINE

## 2024-10-14 PROCEDURE — 94640 AIRWAY INHALATION TREATMENT: CPT

## 2024-10-14 PROCEDURE — 11000001 HC ACUTE MED/SURG PRIVATE ROOM

## 2024-10-14 PROCEDURE — 94761 N-INVAS EAR/PLS OXIMETRY MLT: CPT

## 2024-10-14 PROCEDURE — 99232 SBSQ HOSP IP/OBS MODERATE 35: CPT | Mod: ,,, | Performed by: HOSPITALIST

## 2024-10-14 PROCEDURE — 36415 COLL VENOUS BLD VENIPUNCTURE: CPT | Performed by: INTERNAL MEDICINE

## 2024-10-14 PROCEDURE — 27000221 HC OXYGEN, UP TO 24 HOURS

## 2024-10-14 PROCEDURE — 99900035 HC TECH TIME PER 15 MIN (STAT)

## 2024-10-14 PROCEDURE — 25000242 PHARM REV CODE 250 ALT 637 W/ HCPCS: Performed by: INTERNAL MEDICINE

## 2024-10-14 RX ADMIN — HYDROCODONE BITARTRATE AND ACETAMINOPHEN 1 TABLET: 10; 325 TABLET ORAL at 08:10

## 2024-10-14 RX ADMIN — TICAGRELOR 90 MG: 90 TABLET ORAL at 08:10

## 2024-10-14 RX ADMIN — CARVEDILOL 6.25 MG: 6.25 TABLET, FILM COATED ORAL at 08:10

## 2024-10-14 RX ADMIN — GABAPENTIN 100 MG: 100 CAPSULE ORAL at 08:10

## 2024-10-14 RX ADMIN — MUPIROCIN: 20 OINTMENT TOPICAL at 08:10

## 2024-10-14 RX ADMIN — BUDESONIDE 0.5 MG: 0.5 INHALANT RESPIRATORY (INHALATION) at 07:10

## 2024-10-14 RX ADMIN — IPRATROPIUM BROMIDE AND ALBUTEROL SULFATE 3 ML: 2.5; .5 SOLUTION RESPIRATORY (INHALATION) at 07:10

## 2024-10-14 RX ADMIN — SODIUM CHLORIDE: 9 INJECTION, SOLUTION INTRAVENOUS at 09:10

## 2024-10-14 RX ADMIN — ATORVASTATIN CALCIUM 80 MG: 80 TABLET, FILM COATED ORAL at 08:10

## 2024-10-14 RX ADMIN — ASPIRIN 81 MG: 81 TABLET, COATED ORAL at 08:10

## 2024-10-14 RX ADMIN — CYPROHEPTADINE HYDROCHLORIDE 4 MG: 4 TABLET ORAL at 08:10

## 2024-10-14 RX ADMIN — PREDNISONE 40 MG: 20 TABLET ORAL at 08:10

## 2024-10-14 RX ADMIN — SEVELAMER CARBONATE 800 MG: 800 TABLET, FILM COATED ORAL at 08:10

## 2024-10-14 RX ADMIN — IPRATROPIUM BROMIDE AND ALBUTEROL SULFATE 3 ML: 2.5; .5 SOLUTION RESPIRATORY (INHALATION) at 02:10

## 2024-10-14 RX ADMIN — NICOTINE 1 PATCH: 14 PATCH, EXTENDED RELEASE TRANSDERMAL at 09:10

## 2024-10-14 RX ADMIN — SEVELAMER CARBONATE 800 MG: 800 TABLET, FILM COATED ORAL at 05:10

## 2024-10-14 RX ADMIN — HEPARIN SODIUM 5000 UNITS: 5000 INJECTION, SOLUTION INTRAVENOUS; SUBCUTANEOUS at 08:10

## 2024-10-14 RX ADMIN — ROFLUMILAST 500 MCG: 500 TABLET ORAL at 08:10

## 2024-10-14 RX ADMIN — IPRATROPIUM BROMIDE AND ALBUTEROL SULFATE 3 ML: 2.5; .5 SOLUTION RESPIRATORY (INHALATION) at 01:10

## 2024-10-14 RX ADMIN — SEVELAMER CARBONATE 800 MG: 800 TABLET, FILM COATED ORAL at 01:10

## 2024-10-14 NOTE — PLAN OF CARE
Problem: Adult Inpatient Plan of Care  Goal: Plan of Care Review  Outcome: Progressing  Goal: Patient-Specific Goal (Individualized)  Outcome: Progressing  Goal: Absence of Hospital-Acquired Illness or Injury  Outcome: Progressing  Goal: Optimal Comfort and Wellbeing  Outcome: Progressing     Problem: Skin Injury Risk Increased  Goal: Skin Health and Integrity  Outcome: Progressing     Problem: Gas Exchange Impaired  Goal: Optimal Gas Exchange  Outcome: Progressing     Problem: Airway Clearance Ineffective  Goal: Effective Airway Clearance  Outcome: Progressing

## 2024-10-14 NOTE — PT/OT/SLP PROGRESS
"Physical Therapy Treatment    Patient Name:  Michoacano Damon   MRN:  [id number]    Recommendations:     Discharge Recommendations: Moderate Intensity Therapy  Discharge Equipment Recommendations: none  Barriers to discharge:  ongoing medical treatment    Assessment:     Michoacano Damon is a 74 y.o. male admitted with a medical diagnosis of Chronic obstructive pulmonary disease with acute exacerbation.  He presents with the following impairments/functional limitations: weakness, impaired endurance, impaired self care skills, impaired functional mobility.    Pt agreeable to exercise at bedside post dialysis and able to complete sa,e wit assist and rest as needed    PT POC discussed with Patricia Rubio DPT     Rehab Prognosis: Fair; patient would benefit from acute skilled PT services to address these deficits and reach maximum level of function.    Recent Surgery: * No surgery found *      Plan:     During this hospitalization, patient to be seen 5 x/week to address the identified rehab impairments via gait training, therapeutic activities, therapeutic exercises, neuromuscular re-education and progress toward the following goals:    Plan of Care Expires:  11/13/24    Subjective     Chief Complaint: Acute hypoxemic respiratory failure   Patient/Family Comments/goals: "I don't have to get up do I"  Pain/Comfort:         Objective:     Communicated with Santa Sifuentes prior to session.  Patient found HOB elevated with bed alarm, oxygen, telemetry, peripheral IV upon PT entry to room.     General Precautions: Standard, fall  Orthopedic Precautions: N/A  Braces: N/A  Respiratory Status: Nasal cannula, flow 2 L/min     Functional Mobility:  Bed Mobility:     Scooting up in bed: moderate assistance and of 2 persons      AM-PAC 6 CLICK MOBILITY  Turning over in bed (including adjusting bedclothes, sheets and blankets)?: 3  Sitting down on and standing up from a chair with arms (e.g., wheelchair, bedside commode, etc.): 2  Moving from " lying on back to sitting on the side of the bed?: 3  Moving to and from a bed to a chair (including a wheelchair)?: 2  Need to walk in hospital room?: 1  Climbing 3-5 steps with a railing?: 1  Basic Mobility Total Score: 12       Treatment & Education:  Pt performed 2 x 15 reps (B) LE exercises: ap, quad set, glut set, straight leg raise, hip ab/adduction, short arc quad, heel slide with active assist       Patient left HOB elevated with all lines intact, call button in reach, and bed alarm on..    GOALS:   Multidisciplinary Problems       Physical Therapy Goals          Problem: Physical Therapy    Goal Priority Disciplines Outcome Interventions   Physical Therapy Goal     PT, PT/OT Progressing    Description: Short Term Goals to be met by: 10/27/24    Patient will increase functional independence with mobility by performin. Supine to sit with Stand by assist  2. Sit to stand transfer with Minimal Assist using Rolling walker  3. Bed to chair transfer with Minimal Assist using Rolling walker  4. Gait  x 10 feet with Moderate Assist using Rolling walker  5. Lower extremity exercise program x30 reps per handout, with assistance as needed    Long Term Goals to be met by: 24    Pt will regain full independent functional mobility with Rolling walker to return to home situation and prior activities of daily living.                        Time Tracking:     PT Received On: 10/14/24  PT Start Time:      PT Stop Time: 1448  PT Total Time (min): 16 min     Billable Minutes: Therapeutic Exercise 15    Treatment Type: Treatment  PT/PTA: PTA     Number of PTA visits since last PT visit: 1     10/14/2024

## 2024-10-14 NOTE — PROGRESS NOTES
[facility]  Nephrology  Progress Note    Patient Name: Michoacano Damon  MRN: [id number]  Admission Date: 10/11/2024  Hospital Length of Stay: 3 days  Attending Provider: Nina Carpio DO   Primary Care Physician: Norma, Primary Doctor  Principal Problem:Chronic obstructive pulmonary disease with acute exacerbation    Subjective:     HPI: 74-year-old man with ESRD, hypertension, and severe COPD.  He presented with shortness of breath which worsened last p.m..  No chest pain.  No nausea    Interval History:  He is alert.  No chest pain.  Shortness of breath has improved.    Review of patient's allergies indicates:  No Known Allergies  Current Facility-Administered Medications   Medication Frequency    0.9%  NaCl infusion PRN    acetaminophen tablet 650 mg Q6H PRN    albuterol-ipratropium 2.5 mg-0.5 mg/3 mL nebulizer solution 3 mL Q6H    aspirin EC tablet 81 mg Daily    atorvastatin tablet 80 mg Daily    budesonide nebulizer solution 0.5 mg Q12H    carvediloL tablet 6.25 mg BID    cyproheptadine 4 mg tablet 4 mg BID    gabapentin capsule 100 mg BID    heparin (porcine) injection 5,000 Units Q12H    HYDROcodone-acetaminophen  mg per tablet 1 tablet Q8H PRN    levoFLOXacin 250 mg/50 mL IVPB 250 mg Q48H    mupirocin 2 % ointment BID    naloxone 0.4 mg/mL injection 0.02 mg PRN    nicotine 14 mg/24 hr 1 patch Daily    ondansetron injection 4 mg Q8H PRN    predniSONE tablet 40 mg Daily    prochlorperazine injection Soln 5 mg Q6H PRN    roflumilast (DALIRESP) tablet 500 mcg Daily    sevelamer carbonate tablet 800 mg TID WM    sodium chloride 0.9% flush 10 mL Q12H PRN    ticagrelor tablet 90 mg BID       Objective:     Vital Signs (Most Recent):  Temp: 97.7 °F (36.5 °C) (10/14/24 0715)  Pulse: 88 (10/14/24 0715)  Resp: 18 (10/14/24 0715)  BP: (!) 131/91 (10/14/24 0715)  SpO2: 96 % (10/14/24 0715) Vital Signs (24h Range):  Temp:  [97.2 °F (36.2 °C)-99.6 °F (37.6 °C)] 97.7 °F (36.5  °C)  Pulse:  [] 88  Resp:  [13-20] 18  SpO2:  [96 %-99 %] 96 %  BP: (112-131)/(61-91) 131/91     Weight: 71.7 kg (158 lb 1.1 oz) (10/14/24 0200)  Body mass index is 22.05 kg/m².  Body surface area is 1.9 meters squared.    No intake/output data recorded.     Physical Exam  Eyes:      Pupils: Pupils are equal, round, and reactive to light.   Cardiovascular:      Rate and Rhythm: Normal rate and regular rhythm.   Pulmonary:      Breath sounds: Rhonchi present. No wheezing or rales.   Abdominal:      Palpations: Abdomen is soft.      Tenderness: There is no abdominal tenderness.   Musculoskeletal:      Cervical back: Neck supple.      Right lower leg: No edema.      Left lower leg: No edema.   Neurological:      General: No focal deficit present.      Mental Status: He is alert.          Significant Labs:  BMP:   Recent Labs   Lab 10/14/24  0330         K 4.1      CO2 28   BUN 37*   CREATININE 4.90*   CALCIUM 8.2*     CBC:   Recent Labs   Lab 10/14/24  0330   WBC 12.70*   RBC 3.07*   HGB 9.0*   HCT 30.0*      MCV 97.7*   MCH 29.3   MCHC 30.0*        Significant Imaging:    Assessment/Plan:     Pulmonary  * Chronic obstructive pulmonary disease with acute exacerbation  He has severe COPD and requires continuous O2.    He is symptomatically improved today    Acute pulmonary edema  Improved.  Additional volume removal during dialysis today    Cardiac/Vascular  CAD (coronary artery disease)  No angina    Hypertension  Controlled    Renal/  ESRD (end stage renal disease)  Dialysis beginning        Thank you for your consult.     Smooth Herrera MD  Nephrology  [facility]

## 2024-10-14 NOTE — PROGRESS NOTES
[facility] Tele[facility] Hospital Medicine  Progress Note    Patient Name: Michoacano Damon  MRN: [id number]  Patient Class: IP- Inpatient   Admission Date: 10/11/2024  Length of Stay: 3 days  Attending Physician: Nina Carpio DO  Primary Care Provider: Norma, Primary Doctor        Subjective:     Principal Problem:Acute hypoxemic respiratory failure        HPI:  Patient is a 74-year-old male with a history of chronic combined heart failure with last known EF of 45% in the setting of a known CAD status post PCI with stent placement of LAD and Lcx, essential hypertension, end-stage renal disease on hemodialysis on Monday Wednesdays and Fridays and oxygen-dependent COPD who presented to the emergency room with a chief complaint of shortness of breath accompanied by nonproductive cough and wheezing. Patient stated that shortness breath started doing dialysis session today.  Patient otherwise denied any associated symptoms such as fever, chills, chest pain palpitation PND orthopnea or lower extremity edema.     On initial presentation patient was tachycardic but vital signs were otherwise stable and patient was afebrile.  Workup was notable for presence of mild interstitial edema seen on chest x-ray in the setting of a mildly elevated troponin levels 171-->191 without any ischemic abnormalities seen on EKG, macrocytic anemia with a hemoglobin in the 10 g range which is his baseline, but otherwise unremarkable for someone with end-stage renal disease on HD.  Physical exam was notable for a presence of diffuse rhonchi with wheezing but otherwise unremarkable without any peripheral edema.  Patient will be admitted with a working diagnosis of acute on chronic combined heart failure along with acute exacerbation of COPD secondary to the former         Overview/Hospital Course:  74 year old presents with SOB; he feels better; he was on dialysis during rounds.  He states he has SOB a lot at home and does  not feel too differently than when he is home.      Vitals:    10/14/24 1200 10/14/24 1230 10/14/24 1303 10/14/24 1422   BP: 95/61 (P) 120/62 (P) 135/69    Pulse: 110 (P) 104 (P) 104 86   Resp: 20 (P) 20 (P) 20 18   Temp:   (P) 97.6 °F (36.4 °C)    TempSrc:       SpO2:    97%   Weight:       Height:             PHYSICAL EXAM:    GEN: NAD; alert and oriented x 3  CVS: regular rate and rhythm; no murmurs  RESP: clear to auscultation bilaterally; no rhonchi, rales, or wheezes noted  GI: soft, non-tender, non-distended; + bowel sounds  EXTR: no clubbing, cyanosis, or edema        Assessment/Plan:      * Acute hypoxemic respiratory failure  Due to COPD; he is a smoker; on nebs; uses 3L O2 at home; on nebs and steroids    Acute on chronic systolic (congestive) heart failure  Has mild edema on CXR; on nebs; feels better    Coronary artery disease  Continue current meds    ESRD (end stage renal disease)  On dialysis        Nina Carpio DO  Department of Hospital Medicine   [facility] - [address] [place] [address]

## 2024-10-14 NOTE — NURSING
[facility]  DIALYSIS TREATMENT SUMMARY      Note: Consult with the attending physician for patient treatment orders, this document is not a physician order.      Patient Information   Patient Michoacano Damon   Date of Birth July 07, 1950   Chart Number [id number]   Location [facility]   Location MRN [id number]   Gender Male   SSN (last 4) [id number]     Treatment Information   Treatment Type Hemodialysis   Treatment Id [id number]   Start Time October 14, 2024 09:55   End Time October 14, 2024 12:55   Acutal Duration 03:00     Treatment Balances   Total Saline Administered 300   Net Fluid Balance 2200    Hemodialysis Orders   Therapy Standard   Orders Verified Time 10/14/2024 09:40    Date Verified 10/14/2024   Duration 3:00   Isolated UF/Bypass No   BFR (mL) 450   DFR X2 BFR   Dialyzer Type OPTIFLUX 160NR   UF Order UF Goal Ordered   UF Goal Ordered (mL) 3300   As Tolerated Yes   Crit-Line used No   Heparin Initial Units Bolus No   Heparin IV Maintenance Bolus No   Heparin IV Infusion No   Potassium (mEq/L) 3.0   Calcium (mEq/L) 2.5   Bicarb (mg/dL) 33   Sodium (mEq/L) 137   Additional Orders Turn UF off if patient becomes symptomatic associated with drop in SBP >20 mmHg.   Clinician Aye Worley RN    Dialysis Access   Access Type AV Access   AV Access   Access Location Forearm - R   Needle 15g   Bruit Yes   Thrill Yes      Vitals   Pre-Treatment Vitals   Time Is BP being recorded? Pre BP Map BP Method Pulse RR Temp How was Weight Obtained? Pre Weight Previous Dry Weight Previous Post Weight Metric Target Fluid Removal (mL) Dialysate Confirmed Clinician   10/14/2024 09:50 BP/Map 124/70 (88) Noninvasive 105 20 97.6 How Obtained: Bed Scale 66.9   Kgs 3300 Yes Aye Worley RN   Comments: received pt to HD tx room, VSS, denies needs, resp even/unlabored. plan of care reviewed.      Post Treatment Vitals   Time Is BP being recorded? BP Map Pulse RR Temp How was Weight Obtained? Post Weight Metric BVP  UF Goal Ordered NSS Given Intra-Procedure Total Machine UF Removed (mL) Crit-Line Ending Profile Crit-Line Refill Crit-Line Ending HCT Crit-Line Max BV% Clinician   10/14/2024 13:03 BP/Map 135/69 (91) 104 20 97.6 How Obtained: Bed scale 64.4 Kgs 53 3000 0 2500     Aye Worley RN   Comments: tolerated tx well, denies needs, VSS, blood returned, needles removed, dsg applied with manual pressure until bleeding stopped. Report given.      Safety checks include: access uncovered and secured, Hemaclip secured for all central line access, machine checks performed, and alarm limits confirmed.     Labs   Hepatitis   HBsAG Lab Result HBsAG Lab Result HBsAG Draw Date Transient Antigenemia(MD Diagnosis Only) Anti-HBs Lab Result Anti-HBs Lab Value Anti-HBs Draw Date Documented By Documented Date Hepatitis Status Hepatitis Status   Negative  10/12/2024  Unknown   Aye Worley 10/14/2024  Susceptible   Notes:       Pre-Treatment Hepatitis Precautions Patient tx outside buffer zone Yes Signing   Patient tx at bedside 1:1 Yes Copy of hepatitis results verified in hospital EMR Yes Signed By Aye Worley RN   Patient tx with immune pts only Yes Hepatitis Information Entered By Aye Worley RN      Pre-Treatment Handoff   Staff Report Received Yes   Report Given by Primary Nurse Santa Sifuentes RN   Time 09:51     Patient Arrival   Patient ID Verified Date of Birth    MRN    Full Name   Patient Consent to treatment verified Yes   Blood Transfusion Consent Verified N/A     Treatment Comments   Treatment Notes tolerated tx well     Post-Treatment Handoff   Report Given to Primary Nurse Santa Sifuentes RN   Time Report Given 13:00   Report Given By Aye Worley RN    Machine Validation   Time 08:30   Date 10/14/2024   Auto Alarm Test Passed Yes   Machine Serial # [id number]   Portable RO Yes   RO Serial# [id number]   Residual Bleach Negative Yes   Was a manufactured mix used? Yes   Machine Log Completed Yes   Total Chlorine (less  than 0.1)? Yes   Total Chlorine Log Completed Yes   Bicarb BiBag   Bibag Size 650   Machine Temp 37   Machine Conductivity 14   Meter Type N/A   Meter Conductivity    Independent Conductivity 14   pH Status Pass Pass   pH    TCD Value    TCD Alarm with +/- 0.5 Yes   NVL enabled validated 100 asymmetric Yes   Safety check complete Aye Worley RN   Second Verification Performed? Yes   Second Verification Performed By Raymon Napoles RN   Reason Not Verified       Serum Lab Values   Time BUN Creatinine Na K (mEq/L) Cl CO2 Ca (mEq/L) Phos Mg (mg/dL) Alb (g/dL) Glucose Hgb Hct WBC Plt PT aPTT INR Other Clinician   10/14/2024 03:30 37 4.90 138 4.1 106 28 8.2 2.4  1.9 105 9.0 30 12.70 215     Aye Worley RN   Comments:         Facility Information Location Dialysis [facility] Multi Diagnosis Chronic obstructive pulmonary disease with acute exacerbation   Facility Information Room # 568 Isolation Information   Admission Date 10/11/2024 Patient Type Chronic dialysis patient with diagnosis of ESRD Isolation Required? N   Ordering MD Dr. Herrera Patient Chronic Unit [facility] Completed by   Account/Finance Number [id number] Code Status Full Code General Tx information Entered by Aye Worley RN   Admission Status InPatient Diagnosis      Start Treatment Time Out Confirmed by Bright Correct access site verified Yes   Treatment Initiation Connections Secured Time Out Completed 09:54 Treatment Start Date 10/14/2024    Saline line double clamped Correct patient verified Yes Treatment Start Time 09:55    N/A Correct procedure verified Yes Patient/Family questions and concerns addressed Yes     Pre Focused Assessment  Anterior LOC   Access Respiratory Efforts Unlabored LOC Alert and Oriented x3   Signs and Symptoms of Infection? No Edema GI / Bowels   Pain Screening Location Generalized GI Bowel sounds present   Does the patient have pain? No Edema Grade 2+    Respiratory Cardiac  Anuric   Lung Sounds Diminished Heart Rhythm  Regular Completed by    Wheezing Telemetry Yes Pre Treatment Focused Assessment Completed By Aye Worley RN   Location Bilateral Skin Time 09:51    Bases Skin Warm Signing   Position Posterior  Dry Signed By Aye Worley RN     Education Focus or Topic Treatment Options Caregiver Education Provided? N/A   Patient Education Method Knowledge / Understanding Assessed Teach back Patient Education Reinforced By   Patient Educated? Yes Family Education Provided? N/A Patient Education Reinforced by Aye Worley RN     Post-Treatment Delay N Post Treatment General Information   Post Treatment Machine Disinfection Requirement Notes tolerated tx well   Patient Transferred to Higher Level of Care? No HD machine external disinfection completed per policy Yes Completed by   Post Treatment Delay PRO external disinfection completed per policy Yes Post Treatment Form Completed By Aye Worley RN   Post Focused Assessment Location Bilateral  Dry   Changes from Pre Focused Assessment  Bases LOC   Changes from Pre Treatment Focused Assessment? No Position Posterior LOC Alert and Oriented x3   Access  Anterior GI / Bowels   Bruit Yes Respiratory Efforts Unlabored GI Bowel sounds present   Thrill Yes Edema    Access Flow Good Location Generalized  Anuric   Dialyzer Clearance Streaked Edema Grade 2+ Completed by   Pain Screening Cardiac Post Treatment Focused Assessment Completed by Aye Worley RN   Does the patient have pain? No Heart Rhythm Regular Date 10/14/2024   Respiratory Telemetry Yes Time 12:56   Lung Sounds Diminished Skin Signing    Wheezing Skin Warm Signed By Aye Worley RN

## 2024-10-14 NOTE — PROGRESS NOTES
RD follow up. While thin, patient has no evident fat or muscle depletion. No weight loss is noted.  Patient does not meet criteria for malnutrition per ASPEN guidelines at this time, however he is at high risk for malnutrition due to chronic illness and poor PO intakes. Poor PO intakes likely related to SOB associated with severe COPD. He is ordered a 2g Sodium diet. Recommend continue diet as tolerated. Encourage good PO intakes.

## 2024-10-14 NOTE — SUBJECTIVE & OBJECTIVE
Interval History:  He is alert.  No chest pain.  Shortness of breath has improved.    Review of patient's allergies indicates:  No Known Allergies  Current Facility-Administered Medications   Medication Frequency    0.9%  NaCl infusion PRN    acetaminophen tablet 650 mg Q6H PRN    albuterol-ipratropium 2.5 mg-0.5 mg/3 mL nebulizer solution 3 mL Q6H    aspirin EC tablet 81 mg Daily    atorvastatin tablet 80 mg Daily    budesonide nebulizer solution 0.5 mg Q12H    carvediloL tablet 6.25 mg BID    cyproheptadine 4 mg tablet 4 mg BID    gabapentin capsule 100 mg BID    heparin (porcine) injection 5,000 Units Q12H    HYDROcodone-acetaminophen  mg per tablet 1 tablet Q8H PRN    levoFLOXacin 250 mg/50 mL IVPB 250 mg Q48H    mupirocin 2 % ointment BID    naloxone 0.4 mg/mL injection 0.02 mg PRN    nicotine 14 mg/24 hr 1 patch Daily    ondansetron injection 4 mg Q8H PRN    predniSONE tablet 40 mg Daily    prochlorperazine injection Soln 5 mg Q6H PRN    roflumilast (DALIRESP) tablet 500 mcg Daily    sevelamer carbonate tablet 800 mg TID WM    sodium chloride 0.9% flush 10 mL Q12H PRN    ticagrelor tablet 90 mg BID       Objective:     Vital Signs (Most Recent):  Temp: 97.7 °F (36.5 °C) (10/14/24 0715)  Pulse: 88 (10/14/24 0715)  Resp: 18 (10/14/24 0715)  BP: (!) 131/91 (10/14/24 0715)  SpO2: 96 % (10/14/24 0715) Vital Signs (24h Range):  Temp:  [97.2 °F (36.2 °C)-99.6 °F (37.6 °C)] 97.7 °F (36.5 °C)  Pulse:  [] 88  Resp:  [13-20] 18  SpO2:  [96 %-99 %] 96 %  BP: (112-131)/(61-91) 131/91     Weight: 71.7 kg (158 lb 1.1 oz) (10/14/24 0200)  Body mass index is 22.05 kg/m².  Body surface area is 1.9 meters squared.    No intake/output data recorded.     Physical Exam  Eyes:      Pupils: Pupils are equal, round, and reactive to light.   Cardiovascular:      Rate and Rhythm: Normal rate and regular rhythm.   Pulmonary:      Breath sounds: Rhonchi present. No wheezing or rales.   Abdominal:      Palpations: Abdomen is  soft.      Tenderness: There is no abdominal tenderness.   Musculoskeletal:      Cervical back: Neck supple.      Right lower leg: No edema.      Left lower leg: No edema.   Neurological:      General: No focal deficit present.      Mental Status: He is alert.          Significant Labs:  BMP:   Recent Labs   Lab 10/14/24  0330         K 4.1      CO2 28   BUN 37*   CREATININE 4.90*   CALCIUM 8.2*     CBC:   Recent Labs   Lab 10/14/24  0330   WBC 12.70*   RBC 3.07*   HGB 9.0*   HCT 30.0*      MCV 97.7*   MCH 29.3   MCHC 30.0*        Significant Imaging:

## 2024-10-14 NOTE — PT/OT/SLP PROGRESS
Occupational Therapy   Treatment    Name: Michoacano Damon  MRN: [id number]  Admitting Diagnosis:  Acute hypoxemic respiratory failure       Recommendations:     Discharge Recommendations: Moderate Intensity Therapy  Discharge Equipment Recommendations:  none  Barriers to discharge:  None    Assessment:     Michoacano Damon is a 74 y.o. male with a medical diagnosis of Acute hypoxemic respiratory failure.  He presents with complaint of fatigue from Dialysis. Performance deficits affecting function are weakness, impaired endurance.     Rehab Prognosis:  Good; patient would benefit from acute skilled OT services to address these deficits and reach maximum level of function.       Plan:     Patient to be seen 5 x/week to address the above listed problems via self-care/home management, therapeutic activities, therapeutic exercises  Plan of Care Expires: 11/17/24  Plan of Care Reviewed with: patient    Subjective     Chief Complaint: Fatigue from Dialysis  Patient/Family Comments/goals: Pt agreed to tx,  Pain/Comfort:  Pain Rating 1: 0/10  Pain Rating Post-Intervention 1: 0/10    Objective:     Communicated with: TEDDY Sifuentes prior to session.  Patient found HOB elevated with oxygen, peripheral IV, telemetry upon OT entry to room.    General Precautions: Standard, fall    Orthopedic Precautions:N/A  Braces: N/A  Respiratory Status: Nasal cannula, flow 2 L/min     Occupational Performance:     Bed Mobility:         Functional Mobility/Transfers:    Functional Mobility:     Activities of Daily Living:        [facility] 6 Click ADL:      Treatment & Education:  Pt agreed to UE strengthening exercises.  1 lb hand wt 2 x sets of 15 reps  (B) shoulder flexion/extension and adduction/abduction and (R) elbow and wrist flexion/extension.  2 lb hand wt x 2 sets of 15 reps   (L) elbow flexion/extension  (L) wrist flexion/extension  Red theraband x 2 sets of 15 reps (L) elbow flexion and extension.    Pt participated well with exercises. Pt  limited today due to fatigue    Patient left HOB elevated with all lines intact and call button in reach    GOALS:   Multidisciplinary Problems       Occupational Therapy Goals          Problem: Occupational Therapy    Goal Priority Disciplines Outcome Interventions   Occupational Therapy Goal     OT, PT/OT Progressing    Description: STG:  Pt will perform grooming with setup  Pt will bathe with min a  Pt will perform UE dressing with min a  Pt will perform LE dressing with min a  Pt will sit EOB x 10 min with SBA  Pt will transfer bed/chair/bsc with min a  Pt will tolerate 20 minutes of tx without fatigue      LT.Restore to max I with self care and mobility.                          Time Tracking:     OT Date of Treatment: 10/14/24  OT Start Time: 1505  OT Stop Time: 1523  OT Total Time (min): 18 min    Billable Minutes:Therapeutic Exercise 16    OT/CHRISTY: OT          10/14/2024

## 2024-10-14 NOTE — ASSESSMENT & PLAN NOTE
Improved.  Additional volume removal during dialysis today   Reason for Call:  Medication or medication refill:    Do you use a [facility]?  Name of the pharmacy and phone number for the current request:  [id number]/ [address] /[place] [address]    Name of the medication requested: patient is completely out of this medication and is starting to have symptoms/ nitroFURantoin macrocrystal-monohydrate (MACROBID) 100 MG capsule    Other request:     Can we leave a detailed message on this number? YES    Phone number patient can be reached at: Home number on file [phone] (home)    Best Time: any    Call taken on 1/21/2022 at 9:27 AM by Haley Subramanian

## 2024-10-15 LAB
ALBUMIN SERPL BCP-MCNC: 2.4 G/DL (ref 3.5–5)
ANION GAP SERPL CALCULATED.3IONS-SCNC: 10 MMOL/L (ref 7–16)
ANISOCYTOSIS BLD QL SMEAR: ABNORMAL
BASOPHILS # BLD AUTO: 0.08 K/UL (ref 0–0.2)
BASOPHILS NFR BLD AUTO: 0.5 % (ref 0–1)
BUN SERPL-MCNC: 32 MG/DL (ref 7–18)
BUN/CREAT SERPL: 7 (ref 6–20)
CALCIUM SERPL-MCNC: 8.3 MG/DL (ref 8.5–10.1)
CHLORIDE SERPL-SCNC: 105 MMOL/L (ref 98–107)
CO2 SERPL-SCNC: 28 MMOL/L (ref 21–32)
CREAT SERPL-MCNC: 4.48 MG/DL (ref 0.7–1.3)
CRENATED CELLS: ABNORMAL
DIFFERENTIAL METHOD BLD: ABNORMAL
EGFR (NO RACE VARIABLE) (RUSH/TITUS): 13 ML/MIN/1.73M2
EOSINOPHIL # BLD AUTO: 0 K/UL (ref 0–0.5)
EOSINOPHIL NFR BLD AUTO: 0 % (ref 1–4)
ERYTHROCYTE [DISTWIDTH] IN BLOOD BY AUTOMATED COUNT: 19.6 % (ref 11.5–14.5)
GLUCOSE SERPL-MCNC: 108 MG/DL (ref 74–106)
HCT VFR BLD AUTO: 39.8 % (ref 40–54)
HGB BLD-MCNC: 11.6 G/DL (ref 13.5–18)
IMM GRANULOCYTES # BLD AUTO: 0.42 K/UL (ref 0–0.04)
IMM GRANULOCYTES NFR BLD: 2.7 % (ref 0–0.4)
LYMPHOCYTES # BLD AUTO: 1.27 K/UL (ref 1–4.8)
LYMPHOCYTES NFR BLD AUTO: 8.2 % (ref 27–41)
LYMPHOCYTES NFR BLD MANUAL: 8 % (ref 27–41)
MACROCYTES BLD QL SMEAR: ABNORMAL
MCH RBC QN AUTO: 29.6 PG (ref 27–31)
MCHC RBC AUTO-ENTMCNC: 29.1 G/DL (ref 32–36)
MCV RBC AUTO: 101.5 FL (ref 80–96)
MONOCYTES # BLD AUTO: 1.37 K/UL (ref 0–0.8)
MONOCYTES NFR BLD AUTO: 8.9 % (ref 2–6)
MONOCYTES NFR BLD MANUAL: 6 % (ref 2–6)
MPC BLD CALC-MCNC: 10.3 FL (ref 9.4–12.4)
NEUTROPHILS # BLD AUTO: 12.3 K/UL (ref 1.8–7.7)
NEUTROPHILS NFR BLD AUTO: 79.7 % (ref 53–65)
NEUTS SEG NFR BLD MANUAL: 86 % (ref 50–62)
NRBC # BLD AUTO: 0.99 X10E3/UL
NRBC BLD MANUAL-RTO: 8 /100 WBC
NRBC, AUTO (.00): 6.4 %
OVALOCYTES BLD QL SMEAR: ABNORMAL
PHOSPHATE SERPL-MCNC: 1.8 MG/DL (ref 2.5–4.5)
PLATELET # BLD AUTO: 198 K/UL (ref 150–400)
PLATELET MORPHOLOGY: ABNORMAL
POLYCHROMASIA BLD QL SMEAR: ABNORMAL
POTASSIUM SERPL-SCNC: 4.6 MMOL/L (ref 3.5–5.1)
RBC # BLD AUTO: 3.92 M/UL (ref 4.6–6.2)
SODIUM SERPL-SCNC: 138 MMOL/L (ref 136–145)
WBC # BLD AUTO: 15.44 K/UL (ref 4.5–11)

## 2024-10-15 PROCEDURE — 25000242 PHARM REV CODE 250 ALT 637 W/ HCPCS: Performed by: INTERNAL MEDICINE

## 2024-10-15 PROCEDURE — 80069 RENAL FUNCTION PANEL: CPT | Performed by: INTERNAL MEDICINE

## 2024-10-15 PROCEDURE — 99900035 HC TECH TIME PER 15 MIN (STAT)

## 2024-10-15 PROCEDURE — 97530 THERAPEUTIC ACTIVITIES: CPT

## 2024-10-15 PROCEDURE — S4991 NICOTINE PATCH NONLEGEND: HCPCS | Performed by: INTERNAL MEDICINE

## 2024-10-15 PROCEDURE — 25000003 PHARM REV CODE 250: Performed by: INTERNAL MEDICINE

## 2024-10-15 PROCEDURE — 36415 COLL VENOUS BLD VENIPUNCTURE: CPT | Performed by: INTERNAL MEDICINE

## 2024-10-15 PROCEDURE — 25000003 PHARM REV CODE 250: Performed by: HOSPITALIST

## 2024-10-15 PROCEDURE — 97530 THERAPEUTIC ACTIVITIES: CPT | Mod: CQ

## 2024-10-15 PROCEDURE — 94761 N-INVAS EAR/PLS OXIMETRY MLT: CPT

## 2024-10-15 PROCEDURE — 27000221 HC OXYGEN, UP TO 24 HOURS

## 2024-10-15 PROCEDURE — 94640 AIRWAY INHALATION TREATMENT: CPT

## 2024-10-15 PROCEDURE — 11000001 HC ACUTE MED/SURG PRIVATE ROOM

## 2024-10-15 PROCEDURE — 63600175 PHARM REV CODE 636 W HCPCS: Performed by: INTERNAL MEDICINE

## 2024-10-15 PROCEDURE — 97110 THERAPEUTIC EXERCISES: CPT | Mod: CQ

## 2024-10-15 PROCEDURE — 85025 COMPLETE CBC W/AUTO DIFF WBC: CPT | Performed by: INTERNAL MEDICINE

## 2024-10-15 PROCEDURE — 99239 HOSP IP/OBS DSCHRG MGMT >30: CPT | Mod: ,,, | Performed by: HOSPITALIST

## 2024-10-15 RX ORDER — PREDNISONE 20 MG/1
40 TABLET ORAL DAILY
Qty: 60 TABLET | Refills: 0 | Status: ON HOLD | OUTPATIENT
Start: 2024-10-16 | End: 2024-11-15

## 2024-10-15 RX ORDER — SODIUM,POTASSIUM PHOSPHATES 280-250MG
1 POWDER IN PACKET (EA) ORAL
Qty: 20 PACKET | Refills: 0 | Status: ON HOLD | OUTPATIENT
Start: 2024-10-15 | End: 2024-10-20

## 2024-10-15 RX ORDER — SODIUM,POTASSIUM PHOSPHATES 280-250MG
1 POWDER IN PACKET (EA) ORAL
Status: DISCONTINUED | OUTPATIENT
Start: 2024-10-15 | End: 2024-10-17 | Stop reason: HOSPADM

## 2024-10-15 RX ADMIN — TICAGRELOR 90 MG: 90 TABLET ORAL at 08:10

## 2024-10-15 RX ADMIN — IPRATROPIUM BROMIDE AND ALBUTEROL SULFATE 3 ML: 2.5; .5 SOLUTION RESPIRATORY (INHALATION) at 07:10

## 2024-10-15 RX ADMIN — CYPROHEPTADINE HYDROCHLORIDE 4 MG: 4 TABLET ORAL at 09:10

## 2024-10-15 RX ADMIN — NICOTINE 1 PATCH: 14 PATCH, EXTENDED RELEASE TRANSDERMAL at 08:10

## 2024-10-15 RX ADMIN — GABAPENTIN 100 MG: 100 CAPSULE ORAL at 09:10

## 2024-10-15 RX ADMIN — MUPIROCIN: 20 OINTMENT TOPICAL at 08:10

## 2024-10-15 RX ADMIN — ASPIRIN 81 MG: 81 TABLET, COATED ORAL at 08:10

## 2024-10-15 RX ADMIN — HEPARIN SODIUM 5000 UNITS: 5000 INJECTION, SOLUTION INTRAVENOUS; SUBCUTANEOUS at 08:10

## 2024-10-15 RX ADMIN — HEPARIN SODIUM 5000 UNITS: 5000 INJECTION, SOLUTION INTRAVENOUS; SUBCUTANEOUS at 09:10

## 2024-10-15 RX ADMIN — CYPROHEPTADINE HYDROCHLORIDE 4 MG: 4 TABLET ORAL at 08:10

## 2024-10-15 RX ADMIN — ROFLUMILAST 500 MCG: 500 TABLET ORAL at 08:10

## 2024-10-15 RX ADMIN — CARVEDILOL 6.25 MG: 6.25 TABLET, FILM COATED ORAL at 08:10

## 2024-10-15 RX ADMIN — BUDESONIDE 0.5 MG: 0.5 INHALANT RESPIRATORY (INHALATION) at 07:10

## 2024-10-15 RX ADMIN — SEVELAMER CARBONATE 800 MG: 800 TABLET, FILM COATED ORAL at 06:10

## 2024-10-15 RX ADMIN — HYDROCODONE BITARTRATE AND ACETAMINOPHEN 1 TABLET: 10; 325 TABLET ORAL at 11:10

## 2024-10-15 RX ADMIN — MUPIROCIN: 20 OINTMENT TOPICAL at 09:10

## 2024-10-15 RX ADMIN — ATORVASTATIN CALCIUM 80 MG: 80 TABLET, FILM COATED ORAL at 08:10

## 2024-10-15 RX ADMIN — POTASSIUM, SODIUM PHOSPHATES 280 MG-160 MG-250 MG ORAL POWDER PACKET 1 PACKET: POWDER IN PACKET at 09:10

## 2024-10-15 RX ADMIN — PREDNISONE 40 MG: 20 TABLET ORAL at 08:10

## 2024-10-15 RX ADMIN — TICAGRELOR 90 MG: 90 TABLET ORAL at 09:10

## 2024-10-15 RX ADMIN — POTASSIUM, SODIUM PHOSPHATES 280 MG-160 MG-250 MG ORAL POWDER PACKET 1 PACKET: POWDER IN PACKET at 06:10

## 2024-10-15 RX ADMIN — SEVELAMER CARBONATE 800 MG: 800 TABLET, FILM COATED ORAL at 08:10

## 2024-10-15 RX ADMIN — SEVELAMER CARBONATE 800 MG: 800 TABLET, FILM COATED ORAL at 11:10

## 2024-10-15 RX ADMIN — IPRATROPIUM BROMIDE AND ALBUTEROL SULFATE 3 ML: 2.5; .5 SOLUTION RESPIRATORY (INHALATION) at 01:10

## 2024-10-15 RX ADMIN — CARVEDILOL 6.25 MG: 6.25 TABLET, FILM COATED ORAL at 09:10

## 2024-10-15 RX ADMIN — GABAPENTIN 100 MG: 100 CAPSULE ORAL at 08:10

## 2024-10-15 NOTE — PT/OT/SLP PROGRESS
"Physical Therapy Treatment    Patient Name:  Michoacano Damon   MRN:  [id number]    Recommendations:     Discharge Recommendations: Moderate Intensity Therapy  Discharge Equipment Recommendations: none  Barriers to discharge:  ongoing medical treatment    Assessment:     Michoacano Damon is a 74 y.o. male admitted with a medical diagnosis of Acute hypoxemic respiratory failure.  He presents with the following impairments/functional limitations: weakness, impaired endurance, impaired self care skills, impaired functional mobility.    Pt with increased participation this PM, however, only able to make steps around to bed from recliner chair.  Pt states that he "had stopped walking" at the nursing home    Rehab Prognosis: Fair; patient would benefit from acute skilled PT services to address these deficits and reach maximum level of function.    Recent Surgery: * No surgery found *      Plan:     During this hospitalization, patient to be seen 5 x/week to address the identified rehab impairments via gait training, therapeutic activities, therapeutic exercises, neuromuscular re-education and progress toward the following goals:    Plan of Care Expires:  11/13/24    Subjective     Chief Complaint: Acute hypoxemic respiratory failure   Patient/Family Comments/goals: pt agreeable  Pain/Comfort:         Objective:     Communicated with Iliana Toussaint prior to session.  Patient found up in chair with oxygen, telemetry, peripheral IV upon PT entry to room.     General Precautions: Standard, fall  Orthopedic Precautions: N/A  Braces: N/A  Respiratory Status: Nasal cannula, flow 2 L/min     Functional Mobility:  Bed Mobility:     Sit to Supine: minimum assistance and assist with B LE management  Transfers:     Sit to Stand:  minimum assistance and of 2 persons with manual assist with gait belt   Chair to bed: minimum assistance and of 2 persons with  manual assist with gait belt    Balance: standby to contact guard assist sitting  " EOB      AM-PAC 6 CLICK MOBILITY  Turning over in bed (including adjusting bedclothes, sheets and blankets)?: 3  Sitting down on and standing up from a chair with arms (e.g., wheelchair, bedside commode, etc.): 3  Moving from lying on back to sitting on the side of the bed?: 3  Moving to and from a bed to a chair (including a wheelchair)?: 3  Need to walk in hospital room?: 3 (steps around to bed from chair)  Climbing 3-5 steps with a railing?: 1  Basic Mobility Total Score: 16       Treatment & Education:  Pt performed 2 x 15 reps (B) LE exercises: ap, quad set, glut set, straight leg raise, hip ab/adduction, long arc quad, heel slide with active assist      Patient left right sidelying with all lines intact, call button in reach, and bed alarm on..    GOALS:   Multidisciplinary Problems       Physical Therapy Goals          Problem: Physical Therapy    Goal Priority Disciplines Outcome Interventions   Physical Therapy Goal     PT, PT/OT Progressing    Description: Short Term Goals to be met by: 10/27/24    Patient will increase functional independence with mobility by performin. Supine to sit with Stand by assist  2. Sit to stand transfer with Minimal Assist using Rolling walker  3. Bed to chair transfer with Minimal Assist using Rolling walker  4. Gait  x 10 feet with Moderate Assist using Rolling walker  5. Lower extremity exercise program x30 reps per handout, with assistance as needed    Long Term Goals to be met by: 24    Pt will regain full independent functional mobility with Rolling walker to return to home situation and prior activities of daily living.                        Time Tracking:     PT Received On: 10/15/24  PT Start Time: 1059     PT Stop Time: 1124  PT Total Time (min): 25 min     Billable Minutes: Therapeutic Activity 8 and Therapeutic Exercise 15    Treatment Type: Treatment  PT/PTA: PTA     Number of PTA visits since last PT visit: 2     10/15/2024

## 2024-10-15 NOTE — PLAN OF CARE
[facility]  Discharge Final Note    Primary Care Provider: Norma, Primary Doctor    Expected Discharge Date: 10/15/2024    Final Discharge Note (most recent)       Final Note - 10/15/24 1419          Final Note    Assessment Type Final Discharge Note     Anticipated Discharge Disposition Skilled Nursing Facility     What phone number can be called within the next 1-3 days to see how you are doing after discharge? [phone]        Post-Acute Status    Post-Acute Authorization Placement     Post-Acute Placement Status Set-up Complete/Auth obtained     Patient choice form signed by patient/caregiver List with quality metrics by geographic area provided;List from CMS Compare;List from System Post-Acute Care     Discharge Delays None known at this time                     Important Message from Medicare  Important Message from Medicare regarding Discharge Appeal Rights: Given to patient/caregiver, Signed/date by patient/caregiver, Explained to patient/caregiver     Date IMM was signed: 10/15/24  Time IMM was signed: 0800     Follow-up providers       Virginia Macario MD   Specialty: Family Medicine    [address] [facility] [address]   Phone: [phone]       Next Steps: Follow up on 10/22/2024    Instructions: 10:00 am WILL SEE DR. MORELOS              After-discharge care                Destination       [facility]   Service: Skilled Nursing    [address] [facility] [address]   Phone: [phone]                             Patient will be discharged back to [facility] today.  Patient's daughter and Louie notified.  IM updated 10/15/2024 at 0800.  Packet taken to [facility] and placed in cubby.  No further needs.

## 2024-10-15 NOTE — DISCHARGE SUMMARY
[facility] Hospital Medicine  Discharge Summary      Patient Name: Michoacano Damon  MRN: [id number]  [facility]: [id number]  Patient Class: IP- Inpatient  Admission Date: 10/11/2024  Hospital Length of Stay: 4 days  Discharge Date and Time:  10/15/2024 10:12 AM  Attending Physician: Nina Carpio DO   Discharging Provider: Nina Carpio DO  Primary Care Provider: Norma Primary Doctor  Primary Care Team: Networked reference to record PCT     HPI:   Patient is a 74-year-old male with a history of chronic combined heart failure with last known EF of 45% in the setting of a known CAD status post PCI with stent placement of LAD and Lcx, essential hypertension, end-stage renal disease on hemodialysis on Monday Wednesdays and Fridays and oxygen-dependent COPD who presented to the emergency room with a chief complaint of shortness of breath accompanied by nonproductive cough and wheezing. Patient stated that shortness breath started doing dialysis session today.  Patient otherwise denied any associated symptoms such as fever, chills, chest pain palpitation PND orthopnea or lower extremity edema.     On initial presentation patient was tachycardic but vital signs were otherwise stable and patient was afebrile.  Workup was notable for presence of mild interstitial edema seen on chest x-ray in the setting of a mildly elevated troponin levels 171-->191 without any ischemic abnormalities seen on EKG, macrocytic anemia with a hemoglobin in the 10 g range which is his baseline, but otherwise unremarkable for someone with end-stage renal disease on HD.  Physical exam was notable for a presence of diffuse rhonchi with wheezing but otherwise unremarkable without any peripheral edema.  Patient will be admitted with a working diagnosis of acute on chronic combined heart failure along with acute exacerbation of COPD secondary to the former             Hospital Course:   74 year old presents with  shortness of breath.  He has known COPD and uses 2-3L O2 at home.  He has had multiple hospital admissions for COPD exacerbations.  He was placed on nebs and steroids with improvement.  CXR showed mild edema; he had a mildly elevated troponin and BNP.  He has known chronic systolic and diastolic CHF.  He was continued on dialysis, which helped with his fluid overload.  He received 2 doses of Levaquin, but does not appear to have pneumonia, and he will not need abx at discharge.  He appears to be at baseline, and does not appear SOB.  He appeared confused on admission but now appears to be mentally at baseline; CT brain negative for acute abnormalities.  He probably has mild dementia.  Patient denies chest pain, shortness of breath, nausea, vomiting, or diarrhea and is stable for discharge.       Vitals:    10/15/24 0200 10/15/24 0338 10/15/24 0729 10/15/24 0739   BP:  100/61 124/79    Pulse:  88 105 100   Resp:  (!) 22 18 17   Temp:  98.4 °F (36.9 °C) 98 °F (36.7 °C)    TempSrc:  Oral Oral    SpO2:  97%  96%   Weight: 71.8 kg (158 lb 4.6 oz)      Height:             PHYSICAL EXAMINATION:    GEN: NAD; alert   CVS: regular rate and rhythm  RESP: normal respiratory effort; no audible wheezing noted  GI: non-distended  EXTR: no swelling noted    Final Active Diagnoses:    Diagnosis Date Noted POA    PRINCIPAL PROBLEM:  Acute hypoxemic respiratory failure [J96.01] 08/26/2024 Yes    Acute on chronic systolic (congestive) heart failure [I50.23] 03/19/2021 Yes    Coronary artery disease [I25.10] 03/19/2021 Yes    ESRD (end stage renal disease) [N18.6] 03/19/2021 Yes      Problems Resolved During this Admission:    Diagnosis Date Noted Date Resolved POA    Chronic midline back pain [M54.9, G89.29] 10/13/2024 10/14/2024 Yes    Tobacco dependency [F17.200] 10/12/2024 10/14/2024 Yes    Acute metabolic encephalopathy [G93.41] 10/12/2024 10/14/2024 Yes    Idiopathic hypotension [I95.0] 10/12/2024 10/14/2024 Yes    Debility  [R53.81] 10/12/2024 10/14/2024 Yes    Anemia of renal disease [N18.9, D63.1] 08/28/2024 10/14/2024 Yes    Type 2 MI (myocardial infarction) [I21.A1] 08/25/2024 10/14/2024 Yes    Chronic obstructive pulmonary disease with acute exacerbation [J44.1]  10/14/2024 Yes    Acute pulmonary edema [J81.0]  10/14/2024 Yes    Hypertension [I10] 03/19/2021 10/14/2024 Yes       Discharged Condition: stable    Disposition: NH    Follow Up:   Follow-up Information       Virginia Macario MD Follow up in 1 week(s).    Specialty: Family Medicine  Contact information:  [address] [facility]  [place] [address]  [phone]                                Medications:  Reconciled Home Medications:      Medication List        START taking these medications      potassium, sodium phosphates 280-160-250 mg Pwpk  Commonly known as: PHOS-NAK  Take 1 packet by mouth 4 (four) times daily before meals and nightly. for 5 days            CHANGE how you take these medications      predniSONE 20 MG tablet  Commonly known as: DELTASONE  Take 2 tablets (40 mg total) by mouth once daily.  Start taking on: October 16, 2024  What changed:   medication strength  how much to take  when to take this  additional instructions            CONTINUE taking these medications      albuterol-ipratropium 2.5 mg-0.5 mg/3 mL nebulizer solution  Commonly known as: DUO-NEB  Take 3 mLs by nebulization every 6 (six) hours as needed for Wheezing. Rescue     arformoteroL 15 mcg/2 mL Nebu  Commonly known as: BROVANA  Take 15 mcg by nebulization 2 (two) times a day. Controller     atorvastatin 80 MG tablet  Commonly known as: LIPITOR  Take 1 tablet (80 mg total) by mouth once daily.     BRILINTA 90 mg tablet  Generic drug: ticagrelor  Take 1 tablet (90 mg total) by mouth 2 (two) times a day.     budesonide 0.5 mg/2 mL nebulizer solution  Commonly known as: PULMICORT  Take 0.5 mg by nebulization 2 (two) times a day.     carvediloL 12.5 MG tablet  Commonly known as:  COREG  Take 12.5 mg by mouth 2 (two) times daily.     cyproheptadine 4 mg tablet  Commonly known as: PERIACTIN  Take 4 mg by mouth 2 (two) times daily.     docusate sodium 100 MG capsule  Commonly known as: COLACE  Take 100 mg by mouth 2 (two) times daily.     gabapentin 100 MG capsule  Commonly known as: NEURONTIN  Take 100 mg by mouth 2 (two) times daily.     HYDROcodone-acetaminophen  mg per tablet  Commonly known as: NORCO  Take 1 tablet by mouth 3 (three) times daily as needed for pain     LIDOcaine-diphenhyd-Al-mag-sim -011-40 mg/30mL mouthwash suspension  Commonly known as: FIRST-MOUTHWASH BLM  Swish and spit 15 mLs every 4 (four) hours as needed (for mouth pain).     NEPHRO-ENMANUEL ORAL  Take 1 tablet by mouth every evening.     phenoL 1.4 % Spra  Commonly known as: CHLORASEPTIC  Take 4 sprays by mouth every 8 (eight) hours as needed (for mouth pain).     roflumilast 500 mcg Tab  Commonly known as: DALIRESP  Take 500 mcg by mouth once daily.     sevelamer carbonate 800 mg Tab  Commonly known as: RENVELA  Take 800 mg by mouth 3 (three) times daily with meals.                Time spent on the discharge of patient: 45 minutes         Nnia Carpio DO  Department of Hospital Medicine  [facility]

## 2024-10-15 NOTE — PT/OT/SLP PROGRESS
Occupational Therapy   Treatment    Name: Michoacano Damon  MRN: [id number]  Admitting Diagnosis:  Acute hypoxemic respiratory failure       Recommendations:     Discharge Recommendations: Moderate Intensity Therapy  Discharge Equipment Recommendations:   (to be determined)  Barriers to discharge:  None    Assessment:     Michoacano Damon is a 74 y.o. male with a medical diagnosis of Acute hypoxemic respiratory failure.  He presents with complaint of back pain.Pt agreed to OT. Performance deficits affecting function are impaired endurance, impaired self care skills, weakness, impaired functional mobility, pain.     Rehab Prognosis:  Good; patient would benefit from acute skilled OT services to address these deficits and reach maximum level of function.       Plan:     Patient to be seen 5 x/week to address the above listed problems via self-care/home management, therapeutic activities, therapeutic exercises  Plan of Care Expires: 11/17/24  Plan of Care Reviewed with: patient    Subjective     Chief Complaint: Back pain  Patient/Family Comments/goals: To d/c to NH  Pain/Comfort:  Pain Rating 1: 3/10  Location 1: back  Pain Addressed 1: Reposition, Distraction  Pain Rating Post-Intervention 1: 3/10    Objective:     Communicated with: TEDDY Cintron prior to session.  Patient found HOB elevated with oxygen, peripheral IV, telemetry upon OT entry to room.    General Precautions: Standard, fall    Orthopedic Precautions:N/A  Braces: N/A  Respiratory Status: Nasal cannula, flow 2 L/min increased to 4 per nurse request after sitting EOB due to SOB.     Occupational Performance:     Bed Mobility:    Patient completed Rolling/Turning to Left with  moderate assistance  Patient completed Supine to Sit with moderate assistance     Functional Mobility/Transfers:  Patient completed Sit <> Stand Transfer with maximal assistance and of 2 persons  with  gait belt and manual assistance   Patient completed Bed <> Chair Transfer using Stand Pivot  technique with maximal assistance and of 2 persons with gait belt and manual assistance  Functional Mobility: NT    Activities of Daily Living:  Grooming: I with washing face, Min a to comb hair    Upper Body Dressing: minimum assistance donning gown and max a donning gown as a robe      [facility] 6 Click ADL:      Treatment & Education:  Pt sat EOB x 13 min with SBA. Sitting time limited due to fatigue.   Therapist unable to get O2 SATs to register prior to mobility. Nurse notified. After much effort SATs registered at 100-02 and 114 HR.   Pt participated well with sitting EOB and transfer to chair.    Patient left up in chair with all lines intact, call button in reach, and nurse notified    GOALS:   Multidisciplinary Problems       Occupational Therapy Goals          Problem: Occupational Therapy    Goal Priority Disciplines Outcome Interventions   Occupational Therapy Goal     OT, PT/OT Progressing    Description: STG:  Pt will perform grooming with setup  Pt will bathe with min a  Pt will perform UE dressing with min a  Pt will perform LE dressing with min a  Pt will sit EOB x 10 min with SBA  Pt will transfer bed/chair/bsc with min a  Pt will tolerate 20 minutes of tx without fatigue      LT.Restore to max I with self care and mobility.                          Time Tracking:     OT Date of Treatment: 10/15/24  OT Start Time: 906  OT Stop Time: 948  OT Total Time (min): 42 min    Billable Minutes:Therapeutic Activity 25    OT/CHRISTY: OT          10/15/2024

## 2024-10-15 NOTE — PLAN OF CARE
Problem: Adult Inpatient Plan of Care  Goal: Plan of Care Review  Outcome: Progressing     Problem: Gas Exchange Impaired  Goal: Optimal Gas Exchange  Outcome: Progressing     Problem: Airway Clearance Ineffective  Goal: Effective Airway Clearance  Outcome: Progressing

## 2024-10-16 VITALS
BODY MASS INDEX: 22.16 KG/M2 | TEMPERATURE: 98 F | HEART RATE: 62 BPM | SYSTOLIC BLOOD PRESSURE: 117 MMHG | RESPIRATION RATE: 20 BRPM | DIASTOLIC BLOOD PRESSURE: 60 MMHG | OXYGEN SATURATION: 99 % | HEIGHT: 71 IN | WEIGHT: 158.31 LBS

## 2024-10-16 LAB
ALBUMIN SERPL BCP-MCNC: 2.3 G/DL (ref 3.5–5)
ANION GAP SERPL CALCULATED.3IONS-SCNC: 10 MMOL/L (ref 7–16)
ANISOCYTOSIS BLD QL SMEAR: ABNORMAL
BASOPHILS # BLD AUTO: 0.09 K/UL (ref 0–0.2)
BASOPHILS NFR BLD AUTO: 0.5 % (ref 0–1)
BUN SERPL-MCNC: 48 MG/DL (ref 7–18)
BUN/CREAT SERPL: 9 (ref 6–20)
CALCIUM SERPL-MCNC: 8.7 MG/DL (ref 8.5–10.1)
CHLORIDE SERPL-SCNC: 105 MMOL/L (ref 98–107)
CO2 SERPL-SCNC: 32 MMOL/L (ref 21–32)
CREAT SERPL-MCNC: 5.64 MG/DL (ref 0.7–1.3)
CRENATED CELLS: ABNORMAL
DIFFERENTIAL METHOD BLD: ABNORMAL
EGFR (NO RACE VARIABLE) (RUSH/TITUS): 10 ML/MIN/1.73M2
EOSINOPHIL # BLD AUTO: 0.01 K/UL (ref 0–0.5)
EOSINOPHIL NFR BLD AUTO: 0.1 % (ref 1–4)
ERYTHROCYTE [DISTWIDTH] IN BLOOD BY AUTOMATED COUNT: 19.8 % (ref 11.5–14.5)
GLUCOSE SERPL-MCNC: 86 MG/DL (ref 74–106)
HCT VFR BLD AUTO: 36.1 % (ref 40–54)
HGB BLD-MCNC: 10.6 G/DL (ref 13.5–18)
IMM GRANULOCYTES # BLD AUTO: 0.53 K/UL (ref 0–0.04)
IMM GRANULOCYTES NFR BLD: 2.9 % (ref 0–0.4)
LYMPHOCYTES # BLD AUTO: 0.94 K/UL (ref 1–4.8)
LYMPHOCYTES NFR BLD AUTO: 5.2 % (ref 27–41)
LYMPHOCYTES NFR BLD MANUAL: 10 % (ref 27–41)
MACROCYTES BLD QL SMEAR: ABNORMAL
MCH RBC QN AUTO: 29 PG (ref 27–31)
MCHC RBC AUTO-ENTMCNC: 29.4 G/DL (ref 32–36)
MCV RBC AUTO: 98.9 FL (ref 80–96)
MONOCYTES # BLD AUTO: 1.85 K/UL (ref 0–0.8)
MONOCYTES NFR BLD AUTO: 10.2 % (ref 2–6)
MONOCYTES NFR BLD MANUAL: 13 % (ref 2–6)
MPC BLD CALC-MCNC: 10.5 FL (ref 9.4–12.4)
NEUTROPHILS # BLD AUTO: 14.77 K/UL (ref 1.8–7.7)
NEUTROPHILS NFR BLD AUTO: 81.1 % (ref 53–65)
NEUTS BAND NFR BLD MANUAL: 6 % (ref 1–5)
NEUTS SEG NFR BLD MANUAL: 71 % (ref 50–62)
NRBC # BLD AUTO: 1.02 X10E3/UL
NRBC BLD MANUAL-RTO: 3 /100 WBC
NRBC, AUTO (.00): 5.6 %
PHOSPHATE SERPL-MCNC: 2.2 MG/DL (ref 2.5–4.5)
PLATELET # BLD AUTO: 181 K/UL (ref 150–400)
PLATELET MORPHOLOGY: NORMAL
POLYCHROMASIA BLD QL SMEAR: ABNORMAL
POTASSIUM SERPL-SCNC: 5.3 MMOL/L (ref 3.5–5.1)
RBC # BLD AUTO: 3.65 M/UL (ref 4.6–6.2)
SODIUM SERPL-SCNC: 142 MMOL/L (ref 136–145)
WBC # BLD AUTO: 18.19 K/UL (ref 4.5–11)

## 2024-10-16 PROCEDURE — 63600175 PHARM REV CODE 636 W HCPCS: Performed by: INTERNAL MEDICINE

## 2024-10-16 PROCEDURE — 36415 COLL VENOUS BLD VENIPUNCTURE: CPT | Performed by: INTERNAL MEDICINE

## 2024-10-16 PROCEDURE — 25000003 PHARM REV CODE 250: Performed by: HOSPITALIST

## 2024-10-16 PROCEDURE — 25000242 PHARM REV CODE 250 ALT 637 W/ HCPCS: Performed by: INTERNAL MEDICINE

## 2024-10-16 PROCEDURE — 25000003 PHARM REV CODE 250: Performed by: INTERNAL MEDICINE

## 2024-10-16 PROCEDURE — 97110 THERAPEUTIC EXERCISES: CPT | Mod: CO

## 2024-10-16 PROCEDURE — S4991 NICOTINE PATCH NONLEGEND: HCPCS | Performed by: INTERNAL MEDICINE

## 2024-10-16 PROCEDURE — 85025 COMPLETE CBC W/AUTO DIFF WBC: CPT | Performed by: INTERNAL MEDICINE

## 2024-10-16 PROCEDURE — 99900035 HC TECH TIME PER 15 MIN (STAT)

## 2024-10-16 PROCEDURE — 80069 RENAL FUNCTION PANEL: CPT | Performed by: INTERNAL MEDICINE

## 2024-10-16 PROCEDURE — 90935 HEMODIALYSIS ONE EVALUATION: CPT

## 2024-10-16 PROCEDURE — 94640 AIRWAY INHALATION TREATMENT: CPT

## 2024-10-16 PROCEDURE — 27000221 HC OXYGEN, UP TO 24 HOURS

## 2024-10-16 PROCEDURE — 94761 N-INVAS EAR/PLS OXIMETRY MLT: CPT

## 2024-10-16 RX ADMIN — POTASSIUM, SODIUM PHOSPHATES 280 MG-160 MG-250 MG ORAL POWDER PACKET 1 PACKET: POWDER IN PACKET at 06:10

## 2024-10-16 RX ADMIN — ASPIRIN 81 MG: 81 TABLET, COATED ORAL at 08:10

## 2024-10-16 RX ADMIN — SEVELAMER CARBONATE 800 MG: 800 TABLET, FILM COATED ORAL at 06:10

## 2024-10-16 RX ADMIN — CYPROHEPTADINE HYDROCHLORIDE 4 MG: 4 TABLET ORAL at 08:10

## 2024-10-16 RX ADMIN — IPRATROPIUM BROMIDE AND ALBUTEROL SULFATE 3 ML: 2.5; .5 SOLUTION RESPIRATORY (INHALATION) at 07:10

## 2024-10-16 RX ADMIN — HEPARIN SODIUM 5000 UNITS: 5000 INJECTION, SOLUTION INTRAVENOUS; SUBCUTANEOUS at 08:10

## 2024-10-16 RX ADMIN — NICOTINE 1 PATCH: 14 PATCH, EXTENDED RELEASE TRANSDERMAL at 08:10

## 2024-10-16 RX ADMIN — BUDESONIDE 0.5 MG: 0.5 INHALANT RESPIRATORY (INHALATION) at 07:10

## 2024-10-16 RX ADMIN — HYDROCODONE BITARTRATE AND ACETAMINOPHEN 1 TABLET: 10; 325 TABLET ORAL at 08:10

## 2024-10-16 RX ADMIN — PREDNISONE 40 MG: 20 TABLET ORAL at 08:10

## 2024-10-16 RX ADMIN — POTASSIUM, SODIUM PHOSPHATES 280 MG-160 MG-250 MG ORAL POWDER PACKET 1 PACKET: POWDER IN PACKET at 05:10

## 2024-10-16 RX ADMIN — ATORVASTATIN CALCIUM 80 MG: 80 TABLET, FILM COATED ORAL at 08:10

## 2024-10-16 RX ADMIN — LEVOFLOXACIN 250 MG: 250 INJECTION, SOLUTION INTRAVENOUS at 01:10

## 2024-10-16 RX ADMIN — ROFLUMILAST 500 MCG: 500 TABLET ORAL at 08:10

## 2024-10-16 RX ADMIN — CARVEDILOL 6.25 MG: 6.25 TABLET, FILM COATED ORAL at 08:10

## 2024-10-16 RX ADMIN — TICAGRELOR 90 MG: 90 TABLET ORAL at 08:10

## 2024-10-16 RX ADMIN — IPRATROPIUM BROMIDE AND ALBUTEROL SULFATE 3 ML: 2.5; .5 SOLUTION RESPIRATORY (INHALATION) at 01:10

## 2024-10-16 RX ADMIN — SEVELAMER CARBONATE 800 MG: 800 TABLET, FILM COATED ORAL at 08:10

## 2024-10-16 RX ADMIN — GABAPENTIN 100 MG: 100 CAPSULE ORAL at 08:10

## 2024-10-16 RX ADMIN — MUPIROCIN: 20 OINTMENT TOPICAL at 09:10

## 2024-10-16 NOTE — NURSING
Patient Information   Patient Michoacano Damon   Date of Birth July 07, 1950   Chart Number [id number]   Location [facility]   Location MRN [id number]   Gender Male   SSN (last 4) 182     Treatment Information   Treatment Type Hemodialysis   Treatment Id [id number]   Start Time October 16, 2024 09:15   End Time October 16, 2024 12:15   Acutal Duration 03:00     Treatment Balances   Total Saline Administered 500   Net Fluid Balance 664   Reason Goal Not Met Hypotension - MD Aware    Hemodialysis Orders   Therapy Standard   Orders Verified Time 10/16/2024 09:15    Date Verified 10/16/2024   Duration 3:00   Isolated UF/Bypass No   BFR (mL) 400   DFR X1.5 BFR   Dialyzer Type OPTIFLUX 160NR   UF Order UF Goal Ordered   UF Goal Ordered (mL) 3000   Crit-Line used No   Heparin Initial Units Bolus No   Heparin IV Maintenance Bolus No   Heparin IV Infusion No   Potassium (mEq/L) 2   Calcium (mEq/L) 2.5   Bicarb (mg/dL) 33   Sodium (mEq/L) 137   Additional Orders UF off for symptomatic hypotension and/or drop in systolic pressure of 20 or >   Clinician Francesca Haas RN    Dialysis Access   Access Type AV Access   AV Access   Access Location Forearm - R   Needle 15g   Bruit Yes   Thrill Yes   Notes Needles placed without difficulty      Vitals   Pre-Treatment Vitals   Time Is BP being recorded? Pre BP Map BP Method Pulse RR Temp How was Weight Obtained? Pre Weight Previous Dry Weight Previous Post Weight Metric Target Fluid Removal (mL) Dialysate Confirmed Clinician    10/16/2024 09:05 BP/Map 108/67 (81) Noninvasive 111 19 97.7 How Obtained: Bed Scale 67.8   Kgs 3500 Yes Francesca Haas RN    Comments: Rec'd via bed per hospital transport.      Post Treatment Vitals   Time Is BP being recorded? BP Map Pulse RR Temp How was Weight Obtained? Post Weight Metric BVP UF Goal Ordered NSS Given Intra-Procedure Total Machine UF Removed (mL) Crit-Line Ending Profile Crit-Line Refill Crit-Line Ending HCT Crit-Line Max BV% Clinician     10/16/2024 12:20 BP/Map 117/60 (79) 98 17 97.6 How Obtained: Bed scale 67.1 Kgs  3000 0 1164     Francesca Haas RN    Comments:       Safety checks include: access uncovered and secured, Hemaclip secured for all central line access, machine checks performed, and alarm limits confirmed.     Labs   Hepatitis   HBsAG Lab Result HBsAG Lab Result HBsAG Draw Date Transient Antigenemia(MD Diagnosis Only) Anti-HBs Lab Result Anti-HBs Lab Value Anti-HBs Draw Date Documented By Documented Date Hepatitis Status Hepatitis Status   Negative  10/12/2024  Unknown  10/12/2024 Minnie Bruner 10/16/2024  Susceptible   Notes:       Pre-Treatment Hepatitis Precautions Copy of hepatitis results verified in hospital EMR Yes Signing   Patient tx outside buffer zone Yes Hepatitis Information Entered By Minnie Bruner RN Signed By Minnie Bruner RN     Pre-Treatment Handoff   Staff Report Received Yes   Report Given by Primary Nurse Izabel Browning   Time 08:30     Patient Arrival   Patient ID Verified Date of Birth    MRN   Patient Consent to treatment verified Yes   Blood Transfusion Consent Verified N/A     Treatment Comments   Treatment Notes No acute distress.     Post-Treatment Handoff   Report Given to Primary Nurse Izabel Browning   Time Report Given 12:50   Report Given By Francesca Haas RN    Machine Validation   Time 09:00   Date 10/16/2024   Auto Alarm Test Passed Yes   Machine Serial # [id number]   Portable RO Yes   RO Serial# [id number]   Residual Bleach Negative Yes   Was a manufactured mix used? No   Machine Log Completed Yes   Total Chlorine (less than 0.1)? Yes   Total Chlorine Log Completed Yes   Bicarb BiBag   Bibag Size 650   Machine Temp 37   Machine Conductivity 14   Meter Type N/A   Meter Conductivity    Independent Conductivity 14   pH Status Pass Pass   pH    TCD Value    TCD Alarm with +/- 0.5    NVL enabled validated 100 asymmetric Yes   Safety check complete Francesca Haas RN   Second Verification Performed? No    Second Verification Performed By    Reason Not Verified No other staff member - unable to leave the patient      Serum Lab Values   Time BUN Creatinine Na K (mEq/L) Cl CO2 Ca (mEq/L) Phos Mg (mg/dL) Alb (g/dL) Glucose Hgb Hct WBC Plt PT aPTT INR Other Clinician    10/16/2024 05:00 48 5.64 142 5.3 105 32 8.7 2.2  2.3 86 10.6 36.1 18.19 181     Minnie Bruner RN    Comments:         Facility Information Location Dialysis [facility] Multi Diagnosis Chronic obstructive pulmonary disease with acute exacerbation   Facility Information Room # 568 Isolation Information   Admission Date 10/11/2024 Patient Type Chronic dialysis patient with diagnosis of ESRD Isolation Required? N   Ordering MD Dr. Herrera Patient Chronic Unit [facility] Completed by   Account/Finance Number [id number] Code Status Full Code General Tx information Entered by Francesca Haas RN   Admission Status InPatient Diagnosis      Start Treatment Time Out Completed 09:11 Treatment Start Date 10/16/2024   Treatment Initiation Connections Secured Correct patient verified Yes Treatment Start Time 09:15    Saline line double clamped Correct procedure verified Yes Patient/Family questions and concerns addressed Yes   Time Out Confirmed by EVA Haas Correct access site verified Yes      Pre Focused Assessment Notes Reports mild sob. O2 via NC @ 3L/M. LOC Oriented to Person   Access Edema  Oriented to Place   Signs and Symptoms of Infection? No Location Facial  Alert   Pain Screening Cardiac    Does the patient have pain? No Heart Rhythm Regular  Anuric   Respiratory Telemetry No Completed by   Lung Sounds Coarse Skin Pre Treatment Focused Assessment Completed By Francesca Haas RN   Location Bilateral Skin Warm Time 09:00   Position Anterior  Dry Signing   Respiratory Efforts Unlabored LOC Signed By Francesca Haas RN     Education  Fluid Intake Focus or Topic Hemodialysis treatment review   Patient Education Method Knowledge / Understanding Assessed Teach back  Method Knowledge / Understanding Assessed Teach back   Patient Educated? Yes Family Education Provided? N/A Patient Education Introduced By   Focus or Topic Diet and/or exercise Caregiver Education Provided? Yes Patient Education Introduced By Francesca Haas RN     Post-Treatment HD machine external disinfection completed per policy Yes Completed by   Post Treatment Delay PRO external disinfection completed per policy Yes Post Treatment Form Completed By Francesca Haas RN   Delay N Post Treatment General Information    Machine Disinfection Requirement Notes Needles removed et pressure applied til hemostasis achieved. Gauze dsg applied . 3 L net fluid removed.    Post Focused Assessment Location Bilateral LOC   Changes from Pre Focused Assessment Position Anterior LOC Oriented to Person   Changes from Pre Treatment Focused Assessment? No Respiratory Efforts Unlabored  Oriented to Place   Access Notes Reports mild sob. O2 via NC @ 3L/M.  Alert   Bruit Yes Edema    Thrill Yes Location Facial  Anuric   Access Flow Good Cardiac Completed by   Dialyzer Clearance Streaked Heart Rhythm Regular Post Treatment Focused Assessment Completed by Francesca Haas RN   Pain Screening Telemetry No Date 10/16/2024   Does the patient have pain? No Skin Time 12:35   Respiratory Skin Warm Signing   Lung Sounds Coarse  Dry Signed By Francesca Haas RN

## 2024-10-16 NOTE — PLAN OF CARE
Patient did not return to [facility] yesterday as planned.  Will return to [facility] today.  Facility and daughter aware.  No further needs.

## 2024-10-16 NOTE — PT/OT/SLP PROGRESS
Occupational Therapy   Treatment    Name: Michoacano Damon  MRN: [id number]  Admitting Diagnosis:  Acute hypoxemic respiratory failure       Recommendations:     Discharge Recommendations: Moderate Intensity Therapy  Discharge Equipment Recommendations:   (to be determined)  Barriers to discharge:       Assessment:     Michoacano Damon is a 74 y.o. male with a medical diagnosis of Acute hypoxemic respiratory failure. . Performance deficits affecting function are weakness, impaired endurance, impaired self care skills.     Rehab Prognosis:  Good; patient would benefit from acute skilled OT services to address these deficits and reach maximum level of function.       Plan:     Patient to be seen 5 x/week to address the above listed problems via self-care/home management, therapeutic activities, therapeutic exercises  Plan of Care Expires: 11/17/24  Plan of Care Reviewed with: patient    Subjective     Chief Complaint:   Patient/Family Comments/goals:   Pain/Comfort:  Pain Rating 1: 0/10    Objective:     Communicated with: Iliana Toussaint RN prior to session.  Patient found HOB elevated with peripheral IV, oxygen upon OT entry to room.    General Precautions: Standard, fall    Orthopedic Precautions:N/A  Braces: N/A  Respiratory Status: Nasal cannula, flow 3 L/min     Occupational Performance:     Bed Mobility:         Functional Mobility/Transfers:    Functional Mobility:     Activities of Daily Living:        [facility] 6 Click ADL:      Treatment & Education:  Pt fatigued from dialysis but agreed to do a few ex's . Pt performed hand helper with 2 rubber band resistances 20 reps x 2, green t ball ex's 20 reps x 2 , rom ex's with 2 lb wt 15 reps x 2 B shld flex,abd/add,elbow flex/ext    Patient left HOB elevated with all lines intact and call button in reach    GOALS:   Multidisciplinary Problems       Occupational Therapy Goals          Problem: Occupational Therapy    Goal Priority Disciplines Outcome Interventions   Occupational Therapy  Goal     OT, PT/OT Progressing    Description: STG:  Pt will perform grooming with setup  Pt will bathe with min a  Pt will perform UE dressing with min a  Pt will perform LE dressing with min a  Pt will sit EOB x 10 min with SBA  Pt will transfer bed/chair/bsc with min a  Pt will tolerate 20 minutes of tx without fatigue      LT.Restore to max I with self care and mobility.                          Time Tracking:     OT Date of Treatment: 10/16/24  OT Start Time: 1406  OT Stop Time:   OT Total Time (min): 18 min    Billable Minutes:Therapeutic Exercise 15    OT/CHRISTY: CHRISTY          10/16/2024

## 2024-10-16 NOTE — SUBJECTIVE & OBJECTIVE
Interval History:  He feels better overall today.    Review of patient's allergies indicates:  No Known Allergies  Current Facility-Administered Medications   Medication Frequency    0.9%  NaCl infusion PRN    acetaminophen tablet 650 mg Q6H PRN    albuterol-ipratropium 2.5 mg-0.5 mg/3 mL nebulizer solution 3 mL Q6H    aspirin EC tablet 81 mg Daily    atorvastatin tablet 80 mg Daily    budesonide nebulizer solution 0.5 mg Q12H    carvediloL tablet 6.25 mg BID    cyproheptadine 4 mg tablet 4 mg BID    gabapentin capsule 100 mg BID    heparin (porcine) injection 5,000 Units Q12H    HYDROcodone-acetaminophen  mg per tablet 1 tablet Q8H PRN    levoFLOXacin 250 mg/50 mL IVPB 250 mg Q48H    mupirocin 2 % ointment BID    naloxone 0.4 mg/mL injection 0.02 mg PRN    nicotine 14 mg/24 hr 1 patch Daily    ondansetron injection 4 mg Q8H PRN    potassium, sodium phosphates 280-160-250 mg packet 1 packet QID (AC & HS)    predniSONE tablet 40 mg Daily    prochlorperazine injection Soln 5 mg Q6H PRN    roflumilast (DALIRESP) tablet 500 mcg Daily    sevelamer carbonate tablet 800 mg TID WM    sodium chloride 0.9% flush 10 mL Q12H PRN    ticagrelor tablet 90 mg BID       Objective:     Vital Signs (Most Recent):  Temp: 98.7 °F (37.1 °C) (10/16/24 0708)  Pulse: 108 (10/16/24 0709)  Resp: 20 (10/16/24 0837)  BP: 126/76 (10/16/24 0708)  SpO2: 96 % (10/16/24 0709) Vital Signs (24h Range):  Temp:  [98.1 °F (36.7 °C)-98.8 °F (37.1 °C)] 98.7 °F (37.1 °C)  Pulse:  [] 108  Resp:  [18-20] 20  SpO2:  [93 %-97 %] 96 %  BP: (105-126)/(58-76) 126/76     Weight: 71.8 kg (158 lb 4.6 oz) (10/15/24 0200)  Body mass index is 22.08 kg/m².  Body surface area is 1.9 meters squared.    No intake/output data recorded.     Physical Exam  Eyes:      Pupils: Pupils are equal, round, and reactive to light.   Cardiovascular:      Rate and Rhythm: Normal rate and regular rhythm.   Pulmonary:      Breath sounds: No wheezing, rhonchi or rales.    Abdominal:      Palpations: Abdomen is soft.      Tenderness: There is no abdominal tenderness.   Musculoskeletal:      Cervical back: Neck supple.      Right lower leg: No edema.      Left lower leg: No edema.   Neurological:      General: No focal deficit present.      Mental Status: He is alert.          Significant Labs:  BMP:   Recent Labs   Lab 10/16/24  0500   GLU 86      K 5.3*      CO2 32   BUN 48*   CREATININE 5.64*   CALCIUM 8.7     CBC:   Recent Labs   Lab 10/16/24  0500   WBC 18.19*   RBC 3.65*   HGB 10.6*   HCT 36.1*      MCV 98.9*   MCH 29.0   MCHC 29.4*        Significant Imaging:

## 2024-10-16 NOTE — PROGRESS NOTES
[facility] - [facility] Telemetry  Nephrology  Progress Note    Patient Name: Michoacano Damon  MRN: [id number]  Admission Date: 10/11/2024  Hospital Length of Stay: 5 days  Attending Provider: Nina Carpio DO   Primary Care Physician: Norma, Primary Doctor  Principal Problem:Acute hypoxemic respiratory failure    Subjective:     HPI: 74-year-old man with ESRD, hypertension, and severe COPD.  He presented with shortness of breath which worsened last p.m..  No chest pain.  No nausea    Interval History:  He feels better overall today.    Review of patient's allergies indicates:  No Known Allergies  Current Facility-Administered Medications   Medication Frequency    0.9%  NaCl infusion PRN    acetaminophen tablet 650 mg Q6H PRN    albuterol-ipratropium 2.5 mg-0.5 mg/3 mL nebulizer solution 3 mL Q6H    aspirin EC tablet 81 mg Daily    atorvastatin tablet 80 mg Daily    budesonide nebulizer solution 0.5 mg Q12H    carvediloL tablet 6.25 mg BID    cyproheptadine 4 mg tablet 4 mg BID    gabapentin capsule 100 mg BID    heparin (porcine) injection 5,000 Units Q12H    HYDROcodone-acetaminophen  mg per tablet 1 tablet Q8H PRN    levoFLOXacin 250 mg/50 mL IVPB 250 mg Q48H    mupirocin 2 % ointment BID    naloxone 0.4 mg/mL injection 0.02 mg PRN    nicotine 14 mg/24 hr 1 patch Daily    ondansetron injection 4 mg Q8H PRN    potassium, sodium phosphates 280-160-250 mg packet 1 packet QID (AC & HS)    predniSONE tablet 40 mg Daily    prochlorperazine injection Soln 5 mg Q6H PRN    roflumilast (DALIRESP) tablet 500 mcg Daily    sevelamer carbonate tablet 800 mg TID WM    sodium chloride 0.9% flush 10 mL Q12H PRN    ticagrelor tablet 90 mg BID       Objective:     Vital Signs (Most Recent):  Temp: 98.7 °F (37.1 °C) (10/16/24 0708)  Pulse: 108 (10/16/24 0709)  Resp: 20 (10/16/24 0837)  BP: 126/76 (10/16/24 0708)  SpO2: 96 % (10/16/24 0709) Vital Signs (24h Range):  Temp:  [98.1 °F (36.7 °C)-98.8 °F (37.1 °C)] 98.7  °F (37.1 °C)  Pulse:  [] 108  Resp:  [18-20] 20  SpO2:  [93 %-97 %] 96 %  BP: (105-126)/(58-76) 126/76     Weight: 71.8 kg (158 lb 4.6 oz) (10/15/24 0200)  Body mass index is 22.08 kg/m².  Body surface area is 1.9 meters squared.    No intake/output data recorded.     Physical Exam  Eyes:      Pupils: Pupils are equal, round, and reactive to light.   Cardiovascular:      Rate and Rhythm: Normal rate and regular rhythm.   Pulmonary:      Breath sounds: No wheezing, rhonchi or rales.   Abdominal:      Palpations: Abdomen is soft.      Tenderness: There is no abdominal tenderness.   Musculoskeletal:      Cervical back: Neck supple.      Right lower leg: No edema.      Left lower leg: No edema.   Neurological:      General: No focal deficit present.      Mental Status: He is alert.          Significant Labs:  BMP:   Recent Labs   Lab 10/16/24  0500   GLU 86      K 5.3*      CO2 32   BUN 48*   CREATININE 5.64*   CALCIUM 8.7     CBC:   Recent Labs   Lab 10/16/24  0500   WBC 18.19*   RBC 3.65*   HGB 10.6*   HCT 36.1*      MCV 98.9*   MCH 29.0   MCHC 29.4*        Significant Imaging:    Assessment/Plan:     Pulmonary  Chronic obstructive pulmonary disease with acute exacerbation-resolved as of 10/14/2024  He has severe COPD and requires continuous O2.    No wheezing this a.m.    Acute pulmonary edema-resolved as of 10/14/2024  Resolved    Cardiac/Vascular  Coronary artery disease  No angina    Hypertension-resolved as of 10/14/2024  Controlled    Renal/  ESRD (end stage renal disease)  Dialysis this a.m.        Thank you for your consult.     Smooth Herrera MD  Nephrology  [facility] - [address] [place] [address]

## 2024-10-16 NOTE — SUBJECTIVE & OBJECTIVE
Interval History:  This is a late entry note for 10/15/2024.  He is alert.  SOB has improved.  Afebrile    Review of patient's allergies indicates:  No Known Allergies  Current Facility-Administered Medications   Medication Frequency    0.9%  NaCl infusion PRN    acetaminophen tablet 650 mg Q6H PRN    albuterol-ipratropium 2.5 mg-0.5 mg/3 mL nebulizer solution 3 mL Q6H    aspirin EC tablet 81 mg Daily    atorvastatin tablet 80 mg Daily    budesonide nebulizer solution 0.5 mg Q12H    carvediloL tablet 6.25 mg BID    cyproheptadine 4 mg tablet 4 mg BID    gabapentin capsule 100 mg BID    heparin (porcine) injection 5,000 Units Q12H    HYDROcodone-acetaminophen  mg per tablet 1 tablet Q8H PRN    levoFLOXacin 250 mg/50 mL IVPB 250 mg Q48H    mupirocin 2 % ointment BID    naloxone 0.4 mg/mL injection 0.02 mg PRN    nicotine 14 mg/24 hr 1 patch Daily    ondansetron injection 4 mg Q8H PRN    potassium, sodium phosphates 280-160-250 mg packet 1 packet QID (AC & HS)    predniSONE tablet 40 mg Daily    prochlorperazine injection Soln 5 mg Q6H PRN    roflumilast (DALIRESP) tablet 500 mcg Daily    sevelamer carbonate tablet 800 mg TID WM    sodium chloride 0.9% flush 10 mL Q12H PRN    ticagrelor tablet 90 mg BID       Objective:     Vital Signs (Most Recent):  Temp: 98.7 °F (37.1 °C) (10/16/24 0708)  Pulse: 108 (10/16/24 0709)  Resp: 20 (10/16/24 0709)  BP: 126/76 (10/16/24 0708)  SpO2: 96 % (10/16/24 0709) Vital Signs (24h Range):  Temp:  [98.1 °F (36.7 °C)-98.8 °F (37.1 °C)] 98.7 °F (37.1 °C)  Pulse:  [] 108  Resp:  [18-20] 20  SpO2:  [93 %-97 %] 96 %  BP: (105-126)/(58-76) 126/76     Weight: 71.8 kg (158 lb 4.6 oz) (10/15/24 0200)  Body mass index is 22.08 kg/m².  Body surface area is 1.9 meters squared.    No intake/output data recorded.     Physical Exam  Eyes:      Pupils: Pupils are equal, round, and reactive to light.   Cardiovascular:      Rate and Rhythm: Normal rate and regular rhythm.   Pulmonary:       Breath sounds: Rhonchi present. No wheezing or rales.   Abdominal:      Palpations: Abdomen is soft.      Tenderness: There is no abdominal tenderness.   Musculoskeletal:      Cervical back: Neck supple.      Right lower leg: No edema.      Left lower leg: No edema.   Neurological:      General: No focal deficit present.      Mental Status: He is alert.          Significant Labs:  All labs within the past 24 hours have been reviewed.     Significant Imaging:

## 2024-10-17 ENCOUNTER — PATIENT OUTREACH (OUTPATIENT)
Dept: ADMINISTRATIVE | Facility: CLINIC | Age: 74
End: 2024-10-17

## 2024-10-17 ENCOUNTER — HOSPITAL ENCOUNTER (INPATIENT)
Facility: HOSPITAL | Age: 74
LOS: 11 days | Discharge: NURSING, REHAB OR LONG-TERM CARE | DRG: 871 | End: 2024-10-28
Attending: EMERGENCY MEDICINE | Admitting: STUDENT IN AN ORGANIZED HEALTH CARE EDUCATION/TRAINING PROGRAM
Payer: MEDICARE

## 2024-10-17 DIAGNOSIS — R07.9 CHEST PAIN: ICD-10-CM

## 2024-10-17 DIAGNOSIS — J18.9 PNEUMONIA OF RIGHT LOWER LOBE DUE TO INFECTIOUS ORGANISM: ICD-10-CM

## 2024-10-17 DIAGNOSIS — J96.01 ACUTE RESPIRATORY FAILURE WITH HYPOXIA AND HYPERCAPNIA: ICD-10-CM

## 2024-10-17 DIAGNOSIS — R06.02 SOB (SHORTNESS OF BREATH): ICD-10-CM

## 2024-10-17 DIAGNOSIS — R65.20 SEVERE SEPSIS: ICD-10-CM

## 2024-10-17 DIAGNOSIS — I95.0 IDIOPATHIC HYPOTENSION: ICD-10-CM

## 2024-10-17 DIAGNOSIS — R06.02 SHORTNESS OF BREATH: ICD-10-CM

## 2024-10-17 DIAGNOSIS — N18.6 ESRD (END STAGE RENAL DISEASE): ICD-10-CM

## 2024-10-17 DIAGNOSIS — I21.A1 TYPE 2 MI (MYOCARDIAL INFARCTION): ICD-10-CM

## 2024-10-17 DIAGNOSIS — J96.02 ACUTE RESPIRATORY FAILURE WITH HYPOXIA AND HYPERCAPNIA: ICD-10-CM

## 2024-10-17 DIAGNOSIS — I50.22 CHRONIC SYSTOLIC HEART FAILURE: ICD-10-CM

## 2024-10-17 DIAGNOSIS — J44.1 ACUTE EXACERBATION OF CHRONIC OBSTRUCTIVE PULMONARY DISEASE (COPD): ICD-10-CM

## 2024-10-17 DIAGNOSIS — J96.21 ACUTE ON CHRONIC RESPIRATORY FAILURE WITH HYPOXIA AND HYPERCAPNIA: ICD-10-CM

## 2024-10-17 DIAGNOSIS — J96.22 ACUTE ON CHRONIC RESPIRATORY FAILURE WITH HYPOXIA AND HYPERCAPNIA: ICD-10-CM

## 2024-10-17 DIAGNOSIS — J96.01 ACUTE HYPOXEMIC RESPIRATORY FAILURE: ICD-10-CM

## 2024-10-17 DIAGNOSIS — K92.1 MELENA: ICD-10-CM

## 2024-10-17 DIAGNOSIS — K92.0 COFFEE GROUND EMESIS: ICD-10-CM

## 2024-10-17 DIAGNOSIS — E87.5 HYPERKALEMIA: ICD-10-CM

## 2024-10-17 DIAGNOSIS — A41.9 SEVERE SEPSIS: ICD-10-CM

## 2024-10-17 DIAGNOSIS — K92.2 ACUTE UPPER GI BLEED: ICD-10-CM

## 2024-10-17 DIAGNOSIS — A41.9 SEPSIS, DUE TO UNSPECIFIED ORGANISM, UNSPECIFIED WHETHER ACUTE ORGAN DYSFUNCTION PRESENT: Primary | ICD-10-CM

## 2024-10-17 DIAGNOSIS — D62 ACUTE BLOOD LOSS ANEMIA: ICD-10-CM

## 2024-10-17 LAB
ALBUMIN SERPL BCP-MCNC: 2.4 G/DL (ref 3.5–5)
ALBUMIN/GLOB SERPL: 0.6 {RATIO}
ALP SERPL-CCNC: 82 U/L (ref 45–115)
ALT SERPL W P-5'-P-CCNC: 24 U/L (ref 16–61)
ANION GAP SERPL CALCULATED.3IONS-SCNC: 8 MMOL/L (ref 7–16)
ANISOCYTOSIS BLD QL SMEAR: ABNORMAL
AST SERPL W P-5'-P-CCNC: 40 U/L (ref 15–37)
BACTERIA BLD CULT: NORMAL
BACTERIA BLD CULT: NORMAL
BASOPHILS # BLD AUTO: 0.05 K/UL (ref 0–0.2)
BASOPHILS NFR BLD AUTO: 0.3 % (ref 0–1)
BILIRUB SERPL-MCNC: 0.4 MG/DL (ref ?–1.2)
BUN SERPL-MCNC: 30 MG/DL (ref 7–18)
BUN/CREAT SERPL: 7 (ref 6–20)
CALCIUM SERPL-MCNC: 9.6 MG/DL (ref 8.5–10.1)
CHLORIDE SERPL-SCNC: 103 MMOL/L (ref 98–107)
CO2 SERPL-SCNC: 32 MMOL/L (ref 21–32)
CREAT SERPL-MCNC: 4.43 MG/DL (ref 0.7–1.3)
CRENATED CELLS: ABNORMAL
DIFFERENTIAL METHOD BLD: ABNORMAL
EGFR (NO RACE VARIABLE) (RUSH/TITUS): 13 ML/MIN/1.73M2
EOSINOPHIL # BLD AUTO: 0.05 K/UL (ref 0–0.5)
EOSINOPHIL NFR BLD AUTO: 0.3 % (ref 1–4)
ERYTHROCYTE [DISTWIDTH] IN BLOOD BY AUTOMATED COUNT: 20.6 % (ref 11.5–14.5)
GLOBULIN SER-MCNC: 4.3 G/DL (ref 2–4)
GLUCOSE SERPL-MCNC: 96 MG/DL (ref 74–106)
HCO3 UR-SCNC: 29.9 MMOL/L (ref 21–28)
HCO3 UR-SCNC: 34.7 MMOL/L (ref 24–28)
HCT VFR BLD AUTO: 40.1 % (ref 40–54)
HCT VFR BLD CALC: 36 % (ref 35–51)
HCT VFR BLD CALC: 43 % (ref 35–51)
HGB BLD-MCNC: 11.4 G/DL (ref 13.5–18)
IMM GRANULOCYTES # BLD AUTO: 0.63 K/UL (ref 0–0.04)
IMM GRANULOCYTES NFR BLD: 3.3 % (ref 0–0.4)
LACTATE SERPL-SCNC: 1.5 MMOL/L (ref 0.4–2)
LACTATE SERPL-SCNC: 3.8 MMOL/L (ref 0.4–2)
LDH SERPL L TO P-CCNC: 0.7 MMOL/L (ref 0.3–1.2)
LDH SERPL L TO P-CCNC: 1.6 MMOL/L (ref 0.3–1.2)
LYMPHOCYTES # BLD AUTO: 0.87 K/UL (ref 1–4.8)
LYMPHOCYTES NFR BLD AUTO: 4.6 % (ref 27–41)
LYMPHOCYTES NFR BLD MANUAL: 2 % (ref 27–41)
MACROCYTES BLD QL SMEAR: ABNORMAL
MCH RBC QN AUTO: 29.1 PG (ref 27–31)
MCHC RBC AUTO-ENTMCNC: 28.4 G/DL (ref 32–36)
MCV RBC AUTO: 102.3 FL (ref 80–96)
MONOCYTES # BLD AUTO: 0.73 K/UL (ref 0–0.8)
MONOCYTES NFR BLD AUTO: 3.9 % (ref 2–6)
MONOCYTES NFR BLD MANUAL: 5 % (ref 2–6)
MPC BLD CALC-MCNC: 10.1 FL (ref 9.4–12.4)
NEUTROPHILS # BLD AUTO: 16.6 K/UL (ref 1.8–7.7)
NEUTROPHILS NFR BLD AUTO: 87.6 % (ref 53–65)
NEUTS BAND NFR BLD MANUAL: 2 % (ref 1–5)
NEUTS SEG NFR BLD MANUAL: 91 % (ref 50–62)
NRBC # BLD AUTO: 0.73 X10E3/UL
NRBC BLD MANUAL-RTO: 7 /100 WBC
NRBC, AUTO (.00): 3.9 %
NT-PROBNP SERPL-MCNC: ABNORMAL PG/ML (ref 1–450)
OVALOCYTES BLD QL SMEAR: ABNORMAL
PCO2 BLDA: 58 MMHG (ref 35–48)
PCO2 BLDA: 95 MMHG (ref 41–51)
PH SMN: 7.17 [PH] (ref 7.32–7.42)
PH SMN: 7.32 [PH] (ref 7.35–7.45)
PLATELET # BLD AUTO: 188 K/UL (ref 150–400)
PLATELET MORPHOLOGY: ABNORMAL
PO2 BLDA: 10 MMHG (ref 25–40)
PO2 BLDA: 336 MMHG (ref 83–108)
POC BASE EXCESS: 2.7 MMOL/L (ref -2–3)
POC BASE EXCESS: 3 MMOL/L (ref -2–3)
POC CO2: 31.7 MMOL/L
POC CO2: 37.6 MMOL/L
POC IONIZED CALCIUM: 1.17 MMOL/L (ref 1.15–1.35)
POC IONIZED CALCIUM: 1.22 MMOL/L (ref 1.15–1.35)
POC SATURATED O2: 100 % (ref 95–98)
POC SATURATED O2: 6 % (ref 40–70)
POCT GLUCOSE: 109 MG/DL (ref 60–95)
POCT GLUCOSE: 96 MG/DL (ref 60–95)
POLYCHROMASIA BLD QL SMEAR: ABNORMAL
POTASSIUM BLD-SCNC: 4.4 MMOL/L (ref 3.4–4.5)
POTASSIUM BLD-SCNC: 5 MMOL/L (ref 3.4–4.5)
POTASSIUM SERPL-SCNC: 5.2 MMOL/L (ref 3.5–5.1)
PROT SERPL-MCNC: 6.7 G/DL (ref 6.4–8.2)
RBC # BLD AUTO: 3.92 M/UL (ref 4.6–6.2)
SODIUM BLD-SCNC: 134 MMOL/L (ref 136–145)
SODIUM BLD-SCNC: 136 MMOL/L (ref 136–145)
SODIUM SERPL-SCNC: 138 MMOL/L (ref 136–145)
TROPONIN I SERPL DL<=0.01 NG/ML-MCNC: 183.4 PG/ML
WBC # BLD AUTO: 18.93 K/UL (ref 4.5–11)

## 2024-10-17 PROCEDURE — 96365 THER/PROPH/DIAG IV INF INIT: CPT

## 2024-10-17 PROCEDURE — 63600175 PHARM REV CODE 636 W HCPCS

## 2024-10-17 PROCEDURE — 83605 ASSAY OF LACTIC ACID: CPT | Performed by: STUDENT IN AN ORGANIZED HEALTH CARE EDUCATION/TRAINING PROGRAM

## 2024-10-17 PROCEDURE — 84484 ASSAY OF TROPONIN QUANT: CPT

## 2024-10-17 PROCEDURE — 99900031 HC PATIENT EDUCATION (STAT)

## 2024-10-17 PROCEDURE — 94640 AIRWAY INHALATION TREATMENT: CPT

## 2024-10-17 PROCEDURE — 99291 CRITICAL CARE FIRST HOUR: CPT | Mod: ,,, | Performed by: STUDENT IN AN ORGANIZED HEALTH CARE EDUCATION/TRAINING PROGRAM

## 2024-10-17 PROCEDURE — 5A09357 ASSISTANCE WITH RESPIRATORY VENTILATION, LESS THAN 24 CONSECUTIVE HOURS, CONTINUOUS POSITIVE AIRWAY PRESSURE: ICD-10-PCS | Performed by: STUDENT IN AN ORGANIZED HEALTH CARE EDUCATION/TRAINING PROGRAM

## 2024-10-17 PROCEDURE — 25000242 PHARM REV CODE 250 ALT 637 W/ HCPCS: Performed by: STUDENT IN AN ORGANIZED HEALTH CARE EDUCATION/TRAINING PROGRAM

## 2024-10-17 PROCEDURE — 82330 ASSAY OF CALCIUM: CPT

## 2024-10-17 PROCEDURE — 83605 ASSAY OF LACTIC ACID: CPT | Performed by: EMERGENCY MEDICINE

## 2024-10-17 PROCEDURE — 36415 COLL VENOUS BLD VENIPUNCTURE: CPT | Performed by: EMERGENCY MEDICINE

## 2024-10-17 PROCEDURE — 63600175 PHARM REV CODE 636 W HCPCS: Performed by: STUDENT IN AN ORGANIZED HEALTH CARE EDUCATION/TRAINING PROGRAM

## 2024-10-17 PROCEDURE — 27000221 HC OXYGEN, UP TO 24 HOURS

## 2024-10-17 PROCEDURE — 27000190 HC CPAP FULL FACE MASK W/VALVE

## 2024-10-17 PROCEDURE — 85025 COMPLETE CBC W/AUTO DIFF WBC: CPT

## 2024-10-17 PROCEDURE — 20000000 HC ICU ROOM

## 2024-10-17 PROCEDURE — 25000003 PHARM REV CODE 250: Performed by: STUDENT IN AN ORGANIZED HEALTH CARE EDUCATION/TRAINING PROGRAM

## 2024-10-17 PROCEDURE — 87040 BLOOD CULTURE FOR BACTERIA: CPT | Performed by: STUDENT IN AN ORGANIZED HEALTH CARE EDUCATION/TRAINING PROGRAM

## 2024-10-17 PROCEDURE — 80053 COMPREHEN METABOLIC PANEL: CPT

## 2024-10-17 PROCEDURE — 99900035 HC TECH TIME PER 15 MIN (STAT)

## 2024-10-17 PROCEDURE — 85014 HEMATOCRIT: CPT

## 2024-10-17 PROCEDURE — 25000003 PHARM REV CODE 250

## 2024-10-17 PROCEDURE — 36415 COLL VENOUS BLD VENIPUNCTURE: CPT

## 2024-10-17 PROCEDURE — 84132 ASSAY OF SERUM POTASSIUM: CPT

## 2024-10-17 PROCEDURE — 94660 CPAP INITIATION&MGMT: CPT

## 2024-10-17 PROCEDURE — 93005 ELECTROCARDIOGRAM TRACING: CPT

## 2024-10-17 PROCEDURE — 96375 TX/PRO/DX INJ NEW DRUG ADDON: CPT

## 2024-10-17 PROCEDURE — 99285 EMERGENCY DEPT VISIT HI MDM: CPT | Mod: 25

## 2024-10-17 PROCEDURE — 84295 ASSAY OF SERUM SODIUM: CPT

## 2024-10-17 PROCEDURE — 82947 ASSAY GLUCOSE BLOOD QUANT: CPT

## 2024-10-17 PROCEDURE — 83880 ASSAY OF NATRIURETIC PEPTIDE: CPT

## 2024-10-17 PROCEDURE — 83605 ASSAY OF LACTIC ACID: CPT

## 2024-10-17 PROCEDURE — 82803 BLOOD GASES ANY COMBINATION: CPT

## 2024-10-17 PROCEDURE — 25000242 PHARM REV CODE 250 ALT 637 W/ HCPCS

## 2024-10-17 PROCEDURE — 25000003 PHARM REV CODE 250: Performed by: HOSPITALIST

## 2024-10-17 RX ORDER — BUDESONIDE 0.5 MG/2ML
0.5 INHALANT ORAL EVERY 12 HOURS
Status: DISCONTINUED | OUTPATIENT
Start: 2024-10-17 | End: 2024-10-29 | Stop reason: HOSPADM

## 2024-10-17 RX ORDER — IPRATROPIUM BROMIDE AND ALBUTEROL SULFATE 2.5; .5 MG/3ML; MG/3ML
3 SOLUTION RESPIRATORY (INHALATION) EVERY 6 HOURS
Status: DISCONTINUED | OUTPATIENT
Start: 2024-10-17 | End: 2024-10-22

## 2024-10-17 RX ORDER — ATORVASTATIN CALCIUM 80 MG/1
80 TABLET, FILM COATED ORAL DAILY
Status: DISCONTINUED | OUTPATIENT
Start: 2024-10-17 | End: 2024-10-29 | Stop reason: HOSPADM

## 2024-10-17 RX ORDER — GABAPENTIN 100 MG/1
100 CAPSULE ORAL 2 TIMES DAILY
Status: DISCONTINUED | OUTPATIENT
Start: 2024-10-17 | End: 2024-10-25

## 2024-10-17 RX ORDER — IPRATROPIUM BROMIDE AND ALBUTEROL SULFATE 2.5; .5 MG/3ML; MG/3ML
SOLUTION RESPIRATORY (INHALATION)
Status: COMPLETED
Start: 2024-10-17 | End: 2024-10-17

## 2024-10-17 RX ORDER — SEVELAMER CARBONATE 800 MG/1
800 TABLET, FILM COATED ORAL
Status: DISCONTINUED | OUTPATIENT
Start: 2024-10-17 | End: 2024-10-25

## 2024-10-17 RX ORDER — METHYLPREDNISOLONE SOD SUCC 125 MG
125 VIAL (EA) INJECTION
Status: COMPLETED | OUTPATIENT
Start: 2024-10-17 | End: 2024-10-17

## 2024-10-17 RX ORDER — CYPROHEPTADINE HYDROCHLORIDE 4 MG/1
4 TABLET ORAL 2 TIMES DAILY
Status: DISCONTINUED | OUTPATIENT
Start: 2024-10-17 | End: 2024-10-23

## 2024-10-17 RX ORDER — ASPIRIN 81 MG/1
81 TABLET ORAL DAILY
Status: DISCONTINUED | OUTPATIENT
Start: 2024-10-17 | End: 2024-10-23

## 2024-10-17 RX ORDER — SODIUM CHLORIDE 9 MG/ML
INJECTION, SOLUTION INTRAVENOUS CONTINUOUS
Status: DISCONTINUED | OUTPATIENT
Start: 2024-10-17 | End: 2024-10-17

## 2024-10-17 RX ORDER — CEFTRIAXONE 1 G/1
2 INJECTION, POWDER, FOR SOLUTION INTRAMUSCULAR; INTRAVENOUS
Status: COMPLETED | OUTPATIENT
Start: 2024-10-17 | End: 2024-10-17

## 2024-10-17 RX ORDER — MUPIROCIN 20 MG/G
OINTMENT TOPICAL 2 TIMES DAILY
Status: COMPLETED | OUTPATIENT
Start: 2024-10-18 | End: 2024-10-22

## 2024-10-17 RX ORDER — SODIUM CHLORIDE 0.9 % (FLUSH) 0.9 %
10 SYRINGE (ML) INJECTION
Status: DISCONTINUED | OUTPATIENT
Start: 2024-10-17 | End: 2024-10-29 | Stop reason: HOSPADM

## 2024-10-17 RX ORDER — CARVEDILOL 6.25 MG/1
6.25 TABLET ORAL 2 TIMES DAILY
Status: DISCONTINUED | OUTPATIENT
Start: 2024-10-17 | End: 2024-10-24

## 2024-10-17 RX ORDER — HYDROCODONE BITARTRATE AND ACETAMINOPHEN 10; 325 MG/1; MG/1
1 TABLET ORAL EVERY 8 HOURS PRN
Status: DISCONTINUED | OUTPATIENT
Start: 2024-10-17 | End: 2024-10-25

## 2024-10-17 RX ORDER — HEPARIN SODIUM 5000 [USP'U]/ML
5000 INJECTION, SOLUTION INTRAVENOUS; SUBCUTANEOUS EVERY 8 HOURS
Status: DISCONTINUED | OUTPATIENT
Start: 2024-10-17 | End: 2024-10-23

## 2024-10-17 RX ORDER — IPRATROPIUM BROMIDE AND ALBUTEROL SULFATE 2.5; .5 MG/3ML; MG/3ML
9 SOLUTION RESPIRATORY (INHALATION)
Status: COMPLETED | OUTPATIENT
Start: 2024-10-17 | End: 2024-10-17

## 2024-10-17 RX ADMIN — METHYLPREDNISOLONE SODIUM SUCCINATE 125 MG: 125 INJECTION, POWDER, FOR SOLUTION INTRAMUSCULAR; INTRAVENOUS at 10:10

## 2024-10-17 RX ADMIN — TICAGRELOR 90 MG: 90 TABLET ORAL at 08:10

## 2024-10-17 RX ADMIN — PIPERACILLIN SODIUM,TAZOBACTAM SODIUM 4.5 G: 4; .5 INJECTION, POWDER, FOR SOLUTION INTRAVENOUS at 12:10

## 2024-10-17 RX ADMIN — CARVEDILOL 6.25 MG: 6.25 TABLET, FILM COATED ORAL at 08:10

## 2024-10-17 RX ADMIN — SODIUM CHLORIDE 500 ML: 9 INJECTION, SOLUTION INTRAVENOUS at 01:10

## 2024-10-17 RX ADMIN — CYPROHEPTADINE HYDROCHLORIDE 4 MG: 4 TABLET ORAL at 08:10

## 2024-10-17 RX ADMIN — BUDESONIDE 0.5 MG: 0.5 INHALANT RESPIRATORY (INHALATION) at 08:10

## 2024-10-17 RX ADMIN — CEFTRIAXONE SODIUM 2 G: 1 INJECTION, POWDER, FOR SOLUTION INTRAMUSCULAR; INTRAVENOUS at 11:10

## 2024-10-17 RX ADMIN — SODIUM CHLORIDE: 9 INJECTION, SOLUTION INTRAVENOUS at 11:10

## 2024-10-17 RX ADMIN — IPRATROPIUM BROMIDE AND ALBUTEROL SULFATE 9 ML: 2.5; .5 SOLUTION RESPIRATORY (INHALATION) at 09:10

## 2024-10-17 RX ADMIN — METHYLPREDNISOLONE SODIUM SUCCINATE 60 MG: 40 INJECTION, POWDER, FOR SOLUTION INTRAMUSCULAR; INTRAVENOUS at 06:10

## 2024-10-17 RX ADMIN — IPRATROPIUM BROMIDE AND ALBUTEROL SULFATE 9 ML: .5; 3 SOLUTION RESPIRATORY (INHALATION) at 09:10

## 2024-10-17 RX ADMIN — GABAPENTIN 100 MG: 100 CAPSULE ORAL at 08:10

## 2024-10-17 RX ADMIN — IPRATROPIUM BROMIDE AND ALBUTEROL SULFATE 3 ML: 2.5; .5 SOLUTION RESPIRATORY (INHALATION) at 07:10

## 2024-10-17 RX ADMIN — HEPARIN SODIUM 5000 UNITS: 5000 INJECTION, SOLUTION INTRAVENOUS; SUBCUTANEOUS at 09:10

## 2024-10-17 RX ADMIN — HYDROCODONE BITARTRATE AND ACETAMINOPHEN 1 TABLET: 10; 325 TABLET ORAL at 11:10

## 2024-10-17 RX ADMIN — AZITHROMYCIN MONOHYDRATE 500 MG: 500 INJECTION, POWDER, LYOPHILIZED, FOR SOLUTION INTRAVENOUS at 11:10

## 2024-10-18 LAB
HCO3 UR-SCNC: 29.2 MMOL/L (ref 21–28)
PCO2 BLDA: 44 MMHG (ref 35–48)
PH SMN: 7.43 [PH] (ref 7.35–7.45)
PO2 BLDA: 88 MMHG (ref 83–108)
POC BASE EXCESS: 4.3 MMOL/L (ref -2–3)
POC SATURATED O2: 97 % (ref 95–98)

## 2024-10-18 PROCEDURE — 5A1D70Z PERFORMANCE OF URINARY FILTRATION, INTERMITTENT, LESS THAN 6 HOURS PER DAY: ICD-10-PCS | Performed by: INTERNAL MEDICINE

## 2024-10-18 PROCEDURE — 94640 AIRWAY INHALATION TREATMENT: CPT

## 2024-10-18 PROCEDURE — 63600175 PHARM REV CODE 636 W HCPCS: Performed by: STUDENT IN AN ORGANIZED HEALTH CARE EDUCATION/TRAINING PROGRAM

## 2024-10-18 PROCEDURE — 11000001 HC ACUTE MED/SURG PRIVATE ROOM

## 2024-10-18 PROCEDURE — 25000003 PHARM REV CODE 250

## 2024-10-18 PROCEDURE — 25000242 PHARM REV CODE 250 ALT 637 W/ HCPCS: Performed by: STUDENT IN AN ORGANIZED HEALTH CARE EDUCATION/TRAINING PROGRAM

## 2024-10-18 PROCEDURE — 27000190 HC CPAP FULL FACE MASK W/VALVE

## 2024-10-18 PROCEDURE — 25000003 PHARM REV CODE 250: Performed by: HOSPITALIST

## 2024-10-18 PROCEDURE — 27000221 HC OXYGEN, UP TO 24 HOURS

## 2024-10-18 PROCEDURE — 80100014 HC HEMODIALYSIS 1:1

## 2024-10-18 PROCEDURE — 99291 CRITICAL CARE FIRST HOUR: CPT | Mod: ,,, | Performed by: STUDENT IN AN ORGANIZED HEALTH CARE EDUCATION/TRAINING PROGRAM

## 2024-10-18 PROCEDURE — 94660 CPAP INITIATION&MGMT: CPT

## 2024-10-18 PROCEDURE — 36600 WITHDRAWAL OF ARTERIAL BLOOD: CPT

## 2024-10-18 PROCEDURE — 82803 BLOOD GASES ANY COMBINATION: CPT

## 2024-10-18 PROCEDURE — 25000003 PHARM REV CODE 250: Performed by: INTERNAL MEDICINE

## 2024-10-18 PROCEDURE — 25000003 PHARM REV CODE 250: Performed by: STUDENT IN AN ORGANIZED HEALTH CARE EDUCATION/TRAINING PROGRAM

## 2024-10-18 PROCEDURE — 99900035 HC TECH TIME PER 15 MIN (STAT)

## 2024-10-18 PROCEDURE — 94761 N-INVAS EAR/PLS OXIMETRY MLT: CPT

## 2024-10-18 RX ORDER — SODIUM CHLORIDE 9 MG/ML
INJECTION, SOLUTION INTRAVENOUS
Status: DISCONTINUED | OUTPATIENT
Start: 2024-10-18 | End: 2024-10-28

## 2024-10-18 RX ADMIN — HEPARIN SODIUM 5000 UNITS: 5000 INJECTION, SOLUTION INTRAVENOUS; SUBCUTANEOUS at 09:10

## 2024-10-18 RX ADMIN — BUDESONIDE 0.5 MG: 0.5 INHALANT RESPIRATORY (INHALATION) at 07:10

## 2024-10-18 RX ADMIN — PIPERACILLIN SODIUM,TAZOBACTAM SODIUM 4.5 G: 4; .5 INJECTION, POWDER, FOR SOLUTION INTRAVENOUS at 12:10

## 2024-10-18 RX ADMIN — CYPROHEPTADINE HYDROCHLORIDE 4 MG: 4 TABLET ORAL at 08:10

## 2024-10-18 RX ADMIN — MUPIROCIN: 20 OINTMENT TOPICAL at 09:10

## 2024-10-18 RX ADMIN — CARVEDILOL 6.25 MG: 6.25 TABLET, FILM COATED ORAL at 08:10

## 2024-10-18 RX ADMIN — GABAPENTIN 100 MG: 100 CAPSULE ORAL at 08:10

## 2024-10-18 RX ADMIN — HYDROCODONE BITARTRATE AND ACETAMINOPHEN 1 TABLET: 10; 325 TABLET ORAL at 06:10

## 2024-10-18 RX ADMIN — ATORVASTATIN CALCIUM 80 MG: 80 TABLET, FILM COATED ORAL at 08:10

## 2024-10-18 RX ADMIN — SEVELAMER CARBONATE 800 MG: 800 TABLET, FILM COATED ORAL at 12:10

## 2024-10-18 RX ADMIN — SODIUM CHLORIDE 1000 ML: 9 INJECTION, SOLUTION INTRAVENOUS at 04:10

## 2024-10-18 RX ADMIN — PIPERACILLIN SODIUM,TAZOBACTAM SODIUM 4.5 G: 4; .5 INJECTION, POWDER, FOR SOLUTION INTRAVENOUS at 11:10

## 2024-10-18 RX ADMIN — HEPARIN SODIUM 5000 UNITS: 5000 INJECTION, SOLUTION INTRAVENOUS; SUBCUTANEOUS at 06:10

## 2024-10-18 RX ADMIN — SEVELAMER CARBONATE 800 MG: 800 TABLET, FILM COATED ORAL at 06:10

## 2024-10-18 RX ADMIN — ASPIRIN 81 MG: 81 TABLET, COATED ORAL at 08:10

## 2024-10-18 RX ADMIN — IPRATROPIUM BROMIDE AND ALBUTEROL SULFATE 3 ML: 2.5; .5 SOLUTION RESPIRATORY (INHALATION) at 12:10

## 2024-10-18 RX ADMIN — MUPIROCIN: 20 OINTMENT TOPICAL at 08:10

## 2024-10-18 RX ADMIN — HEPARIN SODIUM 5000 UNITS: 5000 INJECTION, SOLUTION INTRAVENOUS; SUBCUTANEOUS at 03:10

## 2024-10-18 RX ADMIN — TICAGRELOR 90 MG: 90 TABLET ORAL at 08:10

## 2024-10-18 RX ADMIN — IPRATROPIUM BROMIDE AND ALBUTEROL SULFATE 3 ML: 2.5; .5 SOLUTION RESPIRATORY (INHALATION) at 07:10

## 2024-10-18 RX ADMIN — PIPERACILLIN SODIUM,TAZOBACTAM SODIUM 4.5 G: 4; .5 INJECTION, POWDER, FOR SOLUTION INTRAVENOUS at 01:10

## 2024-10-18 RX ADMIN — SEVELAMER CARBONATE 800 MG: 800 TABLET, FILM COATED ORAL at 08:10

## 2024-10-18 RX ADMIN — PREDNISONE 50 MG: 20 TABLET ORAL at 08:10

## 2024-10-19 LAB
ALBUMIN SERPL BCP-MCNC: 1.9 G/DL (ref 3.5–5)
ANION GAP SERPL CALCULATED.3IONS-SCNC: 11 MMOL/L (ref 7–16)
ANISOCYTOSIS BLD QL SMEAR: ABNORMAL
BASOPHILS # BLD AUTO: 0.01 K/UL (ref 0–0.2)
BASOPHILS NFR BLD AUTO: 0.1 % (ref 0–1)
BUN SERPL-MCNC: 30 MG/DL (ref 7–18)
BUN/CREAT SERPL: 8 (ref 6–20)
BURR CELLS BLD QL SMEAR: ABNORMAL
CALCIUM SERPL-MCNC: 8.6 MG/DL (ref 8.5–10.1)
CHLORIDE SERPL-SCNC: 101 MMOL/L (ref 98–107)
CO2 SERPL-SCNC: 30 MMOL/L (ref 21–32)
CREAT SERPL-MCNC: 3.88 MG/DL (ref 0.7–1.3)
CRENATED CELLS: ABNORMAL
DIFFERENTIAL METHOD BLD: ABNORMAL
EGFR (NO RACE VARIABLE) (RUSH/TITUS): 15 ML/MIN/1.73M2
EOSINOPHIL # BLD AUTO: 0 K/UL (ref 0–0.5)
EOSINOPHIL NFR BLD AUTO: 0 % (ref 1–4)
ERYTHROCYTE [DISTWIDTH] IN BLOOD BY AUTOMATED COUNT: 20.4 % (ref 11.5–14.5)
GLUCOSE SERPL-MCNC: 108 MG/DL (ref 74–106)
HCT VFR BLD AUTO: 35.5 % (ref 40–54)
HGB BLD-MCNC: 10.1 G/DL (ref 13.5–18)
HYPOCHROMIA BLD QL SMEAR: ABNORMAL
IMM GRANULOCYTES # BLD AUTO: 0.13 K/UL (ref 0–0.04)
IMM GRANULOCYTES NFR BLD: 1 % (ref 0–0.4)
LYMPHOCYTES # BLD AUTO: 0.28 K/UL (ref 1–4.8)
LYMPHOCYTES NFR BLD AUTO: 2.1 % (ref 27–41)
LYMPHOCYTES NFR BLD MANUAL: 1 % (ref 27–41)
MACROCYTES BLD QL SMEAR: ABNORMAL
MCH RBC QN AUTO: 29.1 PG (ref 27–31)
MCHC RBC AUTO-ENTMCNC: 28.5 G/DL (ref 32–36)
MCV RBC AUTO: 102.3 FL (ref 80–96)
MONOCYTES # BLD AUTO: 0.3 K/UL (ref 0–0.8)
MONOCYTES NFR BLD AUTO: 2.3 % (ref 2–6)
MONOCYTES NFR BLD MANUAL: 1 % (ref 2–6)
MPC BLD CALC-MCNC: 10.4 FL (ref 9.4–12.4)
NEUTROPHILS # BLD AUTO: 12.46 K/UL (ref 1.8–7.7)
NEUTROPHILS NFR BLD AUTO: 94.5 % (ref 53–65)
NEUTS SEG NFR BLD MANUAL: 98 % (ref 50–62)
NRBC # BLD AUTO: 0.13 X10E3/UL
NRBC BLD MANUAL-RTO: 1 /100 WBC
NRBC, AUTO (.00): 1 %
OVALOCYTES BLD QL SMEAR: ABNORMAL
PHOSPHATE SERPL-MCNC: 3.3 MG/DL (ref 2.5–4.5)
PLATELET # BLD AUTO: 139 K/UL (ref 150–400)
PLATELET MORPHOLOGY: ABNORMAL
POLYCHROMASIA BLD QL SMEAR: ABNORMAL
POTASSIUM SERPL-SCNC: 4.9 MMOL/L (ref 3.5–5.1)
RBC # BLD AUTO: 3.47 M/UL (ref 4.6–6.2)
SODIUM SERPL-SCNC: 137 MMOL/L (ref 136–145)
WBC # BLD AUTO: 13.18 K/UL (ref 4.5–11)

## 2024-10-19 PROCEDURE — 99900035 HC TECH TIME PER 15 MIN (STAT)

## 2024-10-19 PROCEDURE — 94761 N-INVAS EAR/PLS OXIMETRY MLT: CPT

## 2024-10-19 PROCEDURE — 80069 RENAL FUNCTION PANEL: CPT | Performed by: HOSPITALIST

## 2024-10-19 PROCEDURE — 99233 SBSQ HOSP IP/OBS HIGH 50: CPT | Mod: ,,, | Performed by: HOSPITALIST

## 2024-10-19 PROCEDURE — 94640 AIRWAY INHALATION TREATMENT: CPT

## 2024-10-19 PROCEDURE — 85025 COMPLETE CBC W/AUTO DIFF WBC: CPT | Performed by: HOSPITALIST

## 2024-10-19 PROCEDURE — 11000001 HC ACUTE MED/SURG PRIVATE ROOM

## 2024-10-19 PROCEDURE — 63600175 PHARM REV CODE 636 W HCPCS: Performed by: STUDENT IN AN ORGANIZED HEALTH CARE EDUCATION/TRAINING PROGRAM

## 2024-10-19 PROCEDURE — 25000003 PHARM REV CODE 250: Performed by: HOSPITALIST

## 2024-10-19 PROCEDURE — 27000221 HC OXYGEN, UP TO 24 HOURS

## 2024-10-19 PROCEDURE — 36415 COLL VENOUS BLD VENIPUNCTURE: CPT | Performed by: HOSPITALIST

## 2024-10-19 PROCEDURE — 25000242 PHARM REV CODE 250 ALT 637 W/ HCPCS: Performed by: STUDENT IN AN ORGANIZED HEALTH CARE EDUCATION/TRAINING PROGRAM

## 2024-10-19 PROCEDURE — 25000003 PHARM REV CODE 250

## 2024-10-19 PROCEDURE — 25000003 PHARM REV CODE 250: Performed by: STUDENT IN AN ORGANIZED HEALTH CARE EDUCATION/TRAINING PROGRAM

## 2024-10-19 RX ADMIN — ATORVASTATIN CALCIUM 80 MG: 80 TABLET, FILM COATED ORAL at 09:10

## 2024-10-19 RX ADMIN — IPRATROPIUM BROMIDE AND ALBUTEROL SULFATE 3 ML: 2.5; .5 SOLUTION RESPIRATORY (INHALATION) at 01:10

## 2024-10-19 RX ADMIN — BUDESONIDE 0.5 MG: 0.5 INHALANT RESPIRATORY (INHALATION) at 07:10

## 2024-10-19 RX ADMIN — HYDROCODONE BITARTRATE AND ACETAMINOPHEN 1 TABLET: 10; 325 TABLET ORAL at 09:10

## 2024-10-19 RX ADMIN — MUPIROCIN: 20 OINTMENT TOPICAL at 09:10

## 2024-10-19 RX ADMIN — PIPERACILLIN SODIUM,TAZOBACTAM SODIUM 4.5 G: 4; .5 INJECTION, POWDER, FOR SOLUTION INTRAVENOUS at 12:10

## 2024-10-19 RX ADMIN — GABAPENTIN 100 MG: 100 CAPSULE ORAL at 09:10

## 2024-10-19 RX ADMIN — CYPROHEPTADINE HYDROCHLORIDE 4 MG: 4 TABLET ORAL at 09:10

## 2024-10-19 RX ADMIN — CARVEDILOL 6.25 MG: 6.25 TABLET, FILM COATED ORAL at 09:10

## 2024-10-19 RX ADMIN — SEVELAMER CARBONATE 800 MG: 800 TABLET, FILM COATED ORAL at 12:10

## 2024-10-19 RX ADMIN — IPRATROPIUM BROMIDE AND ALBUTEROL SULFATE 3 ML: 2.5; .5 SOLUTION RESPIRATORY (INHALATION) at 07:10

## 2024-10-19 RX ADMIN — SEVELAMER CARBONATE 800 MG: 800 TABLET, FILM COATED ORAL at 05:10

## 2024-10-19 RX ADMIN — TICAGRELOR 90 MG: 90 TABLET ORAL at 09:10

## 2024-10-19 RX ADMIN — HEPARIN SODIUM 5000 UNITS: 5000 INJECTION, SOLUTION INTRAVENOUS; SUBCUTANEOUS at 09:10

## 2024-10-19 RX ADMIN — HEPARIN SODIUM 5000 UNITS: 5000 INJECTION, SOLUTION INTRAVENOUS; SUBCUTANEOUS at 06:10

## 2024-10-19 RX ADMIN — PREDNISONE 50 MG: 20 TABLET ORAL at 09:10

## 2024-10-19 RX ADMIN — HEPARIN SODIUM 5000 UNITS: 5000 INJECTION, SOLUTION INTRAVENOUS; SUBCUTANEOUS at 02:10

## 2024-10-19 RX ADMIN — IPRATROPIUM BROMIDE AND ALBUTEROL SULFATE 3 ML: 2.5; .5 SOLUTION RESPIRATORY (INHALATION) at 12:10

## 2024-10-19 RX ADMIN — ASPIRIN 81 MG: 81 TABLET, COATED ORAL at 09:10

## 2024-10-19 RX ADMIN — SEVELAMER CARBONATE 800 MG: 800 TABLET, FILM COATED ORAL at 09:10

## 2024-10-20 LAB
OHS QRS DURATION: 76 MS
OHS QTC CALCULATION: 395 MS

## 2024-10-20 PROCEDURE — 99233 SBSQ HOSP IP/OBS HIGH 50: CPT | Mod: ,,, | Performed by: HOSPITALIST

## 2024-10-20 PROCEDURE — 94761 N-INVAS EAR/PLS OXIMETRY MLT: CPT

## 2024-10-20 PROCEDURE — 25000003 PHARM REV CODE 250

## 2024-10-20 PROCEDURE — 99900035 HC TECH TIME PER 15 MIN (STAT)

## 2024-10-20 PROCEDURE — 63600175 PHARM REV CODE 636 W HCPCS: Performed by: STUDENT IN AN ORGANIZED HEALTH CARE EDUCATION/TRAINING PROGRAM

## 2024-10-20 PROCEDURE — 27000221 HC OXYGEN, UP TO 24 HOURS

## 2024-10-20 PROCEDURE — 11000001 HC ACUTE MED/SURG PRIVATE ROOM

## 2024-10-20 PROCEDURE — 25000242 PHARM REV CODE 250 ALT 637 W/ HCPCS: Performed by: STUDENT IN AN ORGANIZED HEALTH CARE EDUCATION/TRAINING PROGRAM

## 2024-10-20 PROCEDURE — 25000003 PHARM REV CODE 250: Performed by: STUDENT IN AN ORGANIZED HEALTH CARE EDUCATION/TRAINING PROGRAM

## 2024-10-20 PROCEDURE — 94640 AIRWAY INHALATION TREATMENT: CPT

## 2024-10-20 PROCEDURE — 25000003 PHARM REV CODE 250: Performed by: HOSPITALIST

## 2024-10-20 RX ORDER — DEXTROSE MONOHYDRATE 5 G/100ML
INJECTION INTRAVENOUS
Status: DISPENSED
Start: 2024-10-20 | End: 2024-10-20

## 2024-10-20 RX ADMIN — SEVELAMER CARBONATE 800 MG: 800 TABLET, FILM COATED ORAL at 05:10

## 2024-10-20 RX ADMIN — IPRATROPIUM BROMIDE AND ALBUTEROL SULFATE 3 ML: 2.5; .5 SOLUTION RESPIRATORY (INHALATION) at 12:10

## 2024-10-20 RX ADMIN — PREDNISONE 50 MG: 20 TABLET ORAL at 08:10

## 2024-10-20 RX ADMIN — TICAGRELOR 90 MG: 90 TABLET ORAL at 09:10

## 2024-10-20 RX ADMIN — HEPARIN SODIUM 5000 UNITS: 5000 INJECTION, SOLUTION INTRAVENOUS; SUBCUTANEOUS at 02:10

## 2024-10-20 RX ADMIN — TICAGRELOR 90 MG: 90 TABLET ORAL at 08:10

## 2024-10-20 RX ADMIN — IPRATROPIUM BROMIDE AND ALBUTEROL SULFATE 3 ML: 2.5; .5 SOLUTION RESPIRATORY (INHALATION) at 06:10

## 2024-10-20 RX ADMIN — CYPROHEPTADINE HYDROCHLORIDE 4 MG: 4 TABLET ORAL at 09:10

## 2024-10-20 RX ADMIN — MUPIROCIN: 20 OINTMENT TOPICAL at 09:10

## 2024-10-20 RX ADMIN — CYPROHEPTADINE HYDROCHLORIDE 4 MG: 4 TABLET ORAL at 08:10

## 2024-10-20 RX ADMIN — GABAPENTIN 100 MG: 100 CAPSULE ORAL at 08:10

## 2024-10-20 RX ADMIN — HEPARIN SODIUM 5000 UNITS: 5000 INJECTION, SOLUTION INTRAVENOUS; SUBCUTANEOUS at 09:10

## 2024-10-20 RX ADMIN — CARVEDILOL 6.25 MG: 6.25 TABLET, FILM COATED ORAL at 09:10

## 2024-10-20 RX ADMIN — PIPERACILLIN SODIUM,TAZOBACTAM SODIUM 4.5 G: 4; .5 INJECTION, POWDER, FOR SOLUTION INTRAVENOUS at 12:10

## 2024-10-20 RX ADMIN — SEVELAMER CARBONATE 800 MG: 800 TABLET, FILM COATED ORAL at 08:10

## 2024-10-20 RX ADMIN — BUDESONIDE 0.5 MG: 0.5 INHALANT RESPIRATORY (INHALATION) at 07:10

## 2024-10-20 RX ADMIN — GABAPENTIN 100 MG: 100 CAPSULE ORAL at 09:10

## 2024-10-20 RX ADMIN — BUDESONIDE 0.5 MG: 0.5 INHALANT RESPIRATORY (INHALATION) at 06:10

## 2024-10-20 RX ADMIN — SEVELAMER CARBONATE 800 MG: 800 TABLET, FILM COATED ORAL at 11:10

## 2024-10-20 RX ADMIN — HYDROCODONE BITARTRATE AND ACETAMINOPHEN 1 TABLET: 10; 325 TABLET ORAL at 08:10

## 2024-10-20 RX ADMIN — ASPIRIN 81 MG: 81 TABLET, COATED ORAL at 08:10

## 2024-10-20 RX ADMIN — CARVEDILOL 6.25 MG: 6.25 TABLET, FILM COATED ORAL at 08:10

## 2024-10-20 RX ADMIN — PIPERACILLIN SODIUM,TAZOBACTAM SODIUM 4.5 G: 4; .5 INJECTION, POWDER, FOR SOLUTION INTRAVENOUS at 11:10

## 2024-10-20 RX ADMIN — IPRATROPIUM BROMIDE AND ALBUTEROL SULFATE 3 ML: 2.5; .5 SOLUTION RESPIRATORY (INHALATION) at 07:10

## 2024-10-20 RX ADMIN — HYDROCODONE BITARTRATE AND ACETAMINOPHEN 1 TABLET: 10; 325 TABLET ORAL at 09:10

## 2024-10-20 RX ADMIN — ATORVASTATIN CALCIUM 80 MG: 80 TABLET, FILM COATED ORAL at 08:10

## 2024-10-20 RX ADMIN — HEPARIN SODIUM 5000 UNITS: 5000 INJECTION, SOLUTION INTRAVENOUS; SUBCUTANEOUS at 05:10

## 2024-10-21 PROBLEM — J96.21 ACUTE ON CHRONIC RESPIRATORY FAILURE WITH HYPOXIA AND HYPERCAPNIA: Status: ACTIVE | Noted: 2024-10-17

## 2024-10-21 PROBLEM — J96.22 ACUTE ON CHRONIC RESPIRATORY FAILURE WITH HYPOXIA AND HYPERCAPNIA: Status: ACTIVE | Noted: 2024-10-17

## 2024-10-21 LAB
ALBUMIN SERPL BCP-MCNC: 2.2 G/DL (ref 3.5–5)
ANION GAP SERPL CALCULATED.3IONS-SCNC: 13 MMOL/L (ref 7–16)
ANISOCYTOSIS BLD QL SMEAR: ABNORMAL
BASOPHILS # BLD AUTO: 0.02 K/UL (ref 0–0.2)
BASOPHILS NFR BLD AUTO: 0.1 % (ref 0–1)
BUN SERPL-MCNC: 46 MG/DL (ref 7–18)
BUN/CREAT SERPL: 8 (ref 6–20)
BURR CELLS BLD QL SMEAR: ABNORMAL
CALCIUM SERPL-MCNC: 8.2 MG/DL (ref 8.5–10.1)
CHLORIDE SERPL-SCNC: 96 MMOL/L (ref 98–107)
CO2 SERPL-SCNC: 29 MMOL/L (ref 21–32)
CREAT SERPL-MCNC: 5.68 MG/DL (ref 0.7–1.3)
CRENATED CELLS: ABNORMAL
DIFFERENTIAL METHOD BLD: ABNORMAL
EGFR (NO RACE VARIABLE) (RUSH/TITUS): 10 ML/MIN/1.73M2
EOSINOPHIL # BLD AUTO: 0.01 K/UL (ref 0–0.5)
EOSINOPHIL NFR BLD AUTO: 0.1 % (ref 1–4)
ERYTHROCYTE [DISTWIDTH] IN BLOOD BY AUTOMATED COUNT: 20.4 % (ref 11.5–14.5)
GLUCOSE SERPL-MCNC: 84 MG/DL (ref 74–106)
HCO3 UR-SCNC: 30 MMOL/L (ref 18–23)
HCT VFR BLD AUTO: 42.1 % (ref 40–54)
HGB BLD-MCNC: 12.4 G/DL (ref 13.5–18)
HYPOCHROMIA BLD QL SMEAR: ABNORMAL
IMM GRANULOCYTES # BLD AUTO: 0.36 K/UL (ref 0–0.04)
IMM GRANULOCYTES NFR BLD: 2.3 % (ref 0–0.4)
LYMPHOCYTES # BLD AUTO: 0.9 K/UL (ref 1–4.8)
LYMPHOCYTES NFR BLD AUTO: 5.8 % (ref 27–41)
LYMPHOCYTES NFR BLD MANUAL: 6 % (ref 27–41)
MACROCYTES BLD QL SMEAR: ABNORMAL
MCH RBC QN AUTO: 29 PG (ref 27–31)
MCHC RBC AUTO-ENTMCNC: 29.5 G/DL (ref 32–36)
MCV RBC AUTO: 98.6 FL (ref 80–96)
MONOCYTES # BLD AUTO: 1.3 K/UL (ref 0–0.8)
MONOCYTES NFR BLD AUTO: 8.4 % (ref 2–6)
MONOCYTES NFR BLD MANUAL: 7 % (ref 2–6)
MPC BLD CALC-MCNC: 9.9 FL (ref 9.4–12.4)
NEUTROPHILS # BLD AUTO: 12.84 K/UL (ref 1.8–7.7)
NEUTROPHILS NFR BLD AUTO: 83.3 % (ref 53–65)
NEUTS BAND NFR BLD MANUAL: 2 % (ref 1–5)
NEUTS SEG NFR BLD MANUAL: 85 % (ref 50–62)
NRBC # BLD AUTO: 0.25 X10E3/UL
NRBC BLD MANUAL-RTO: 4 /100 WBC
NRBC, AUTO (.00): 1.6 %
OHS QRS DURATION: 80 MS
OHS QTC CALCULATION: 431 MS
OVALOCYTES BLD QL SMEAR: ABNORMAL
PCO2 BLDA: 61 MMHG (ref 35–48)
PH SMN: 7.3 [PH] (ref 7.35–7.45)
PHOSPHATE SERPL-MCNC: 5.1 MG/DL (ref 2.5–4.5)
PLATELET # BLD AUTO: 143 K/UL (ref 150–400)
PLATELET MORPHOLOGY: ABNORMAL
PO2 BLDA: 62 MMHG (ref 83–108)
POC A-ADO2: 90 MMHG
POC BASE EXCESS: 2 MMOL/L
POC SATURATED O2: 95.1 %
POLYCHROMASIA BLD QL SMEAR: ABNORMAL
POTASSIUM SERPL-SCNC: 6.4 MMOL/L (ref 3.5–5.1)
RBC # BLD AUTO: 4.27 M/UL (ref 4.6–6.2)
SODIUM SERPL-SCNC: 132 MMOL/L (ref 136–145)
WBC # BLD AUTO: 15.43 K/UL (ref 4.5–11)

## 2024-10-21 PROCEDURE — 90935 HEMODIALYSIS ONE EVALUATION: CPT

## 2024-10-21 PROCEDURE — 36600 WITHDRAWAL OF ARTERIAL BLOOD: CPT

## 2024-10-21 PROCEDURE — 27000221 HC OXYGEN, UP TO 24 HOURS

## 2024-10-21 PROCEDURE — 99900035 HC TECH TIME PER 15 MIN (STAT)

## 2024-10-21 PROCEDURE — 82803 BLOOD GASES ANY COMBINATION: CPT

## 2024-10-21 PROCEDURE — 93005 ELECTROCARDIOGRAM TRACING: CPT

## 2024-10-21 PROCEDURE — 94640 AIRWAY INHALATION TREATMENT: CPT

## 2024-10-21 PROCEDURE — 94761 N-INVAS EAR/PLS OXIMETRY MLT: CPT

## 2024-10-21 PROCEDURE — 80069 RENAL FUNCTION PANEL: CPT | Performed by: HOSPITALIST

## 2024-10-21 PROCEDURE — 85025 COMPLETE CBC W/AUTO DIFF WBC: CPT | Performed by: HOSPITALIST

## 2024-10-21 PROCEDURE — 36415 COLL VENOUS BLD VENIPUNCTURE: CPT | Performed by: HOSPITALIST

## 2024-10-21 PROCEDURE — 25000003 PHARM REV CODE 250: Performed by: STUDENT IN AN ORGANIZED HEALTH CARE EDUCATION/TRAINING PROGRAM

## 2024-10-21 PROCEDURE — 25000003 PHARM REV CODE 250: Performed by: INTERNAL MEDICINE

## 2024-10-21 PROCEDURE — 25000242 PHARM REV CODE 250 ALT 637 W/ HCPCS: Performed by: STUDENT IN AN ORGANIZED HEALTH CARE EDUCATION/TRAINING PROGRAM

## 2024-10-21 PROCEDURE — 25000003 PHARM REV CODE 250: Performed by: HOSPITALIST

## 2024-10-21 PROCEDURE — 63600175 PHARM REV CODE 636 W HCPCS: Performed by: STUDENT IN AN ORGANIZED HEALTH CARE EDUCATION/TRAINING PROGRAM

## 2024-10-21 PROCEDURE — 93010 ELECTROCARDIOGRAM REPORT: CPT | Mod: ,,, | Performed by: INTERNAL MEDICINE

## 2024-10-21 PROCEDURE — 11000001 HC ACUTE MED/SURG PRIVATE ROOM

## 2024-10-21 PROCEDURE — 63600175 PHARM REV CODE 636 W HCPCS: Performed by: INTERNAL MEDICINE

## 2024-10-21 PROCEDURE — 94660 CPAP INITIATION&MGMT: CPT

## 2024-10-21 PROCEDURE — 99233 SBSQ HOSP IP/OBS HIGH 50: CPT | Mod: ,,, | Performed by: INTERNAL MEDICINE

## 2024-10-21 RX ORDER — ALUMINUM HYDROXIDE, MAGNESIUM HYDROXIDE, AND SIMETHICONE 2400; 240; 2400 MG/30ML; MG/30ML; MG/30ML
30 SUSPENSION ORAL EVERY 6 HOURS PRN
Status: DISCONTINUED | OUTPATIENT
Start: 2024-10-21 | End: 2024-10-29 | Stop reason: HOSPADM

## 2024-10-21 RX ADMIN — CARVEDILOL 6.25 MG: 6.25 TABLET, FILM COATED ORAL at 09:10

## 2024-10-21 RX ADMIN — PREDNISONE 50 MG: 20 TABLET ORAL at 09:10

## 2024-10-21 RX ADMIN — HYDROCODONE BITARTRATE AND ACETAMINOPHEN 1 TABLET: 10; 325 TABLET ORAL at 02:10

## 2024-10-21 RX ADMIN — MUPIROCIN: 20 OINTMENT TOPICAL at 02:10

## 2024-10-21 RX ADMIN — MUPIROCIN: 20 OINTMENT TOPICAL at 09:10

## 2024-10-21 RX ADMIN — TICAGRELOR 90 MG: 90 TABLET ORAL at 09:10

## 2024-10-21 RX ADMIN — IPRATROPIUM BROMIDE AND ALBUTEROL SULFATE 3 ML: 2.5; .5 SOLUTION RESPIRATORY (INHALATION) at 07:10

## 2024-10-21 RX ADMIN — SODIUM CHLORIDE 2000 ML: 9 INJECTION, SOLUTION INTRAVENOUS at 01:10

## 2024-10-21 RX ADMIN — BUDESONIDE 0.5 MG: 0.5 INHALANT RESPIRATORY (INHALATION) at 07:10

## 2024-10-21 RX ADMIN — HEPARIN SODIUM 5000 UNITS: 5000 INJECTION, SOLUTION INTRAVENOUS; SUBCUTANEOUS at 09:10

## 2024-10-21 RX ADMIN — PIPERACILLIN SODIUM,TAZOBACTAM SODIUM 4.5 G: 4; .5 INJECTION, POWDER, FOR SOLUTION INTRAVENOUS at 02:10

## 2024-10-21 RX ADMIN — IPRATROPIUM BROMIDE AND ALBUTEROL SULFATE 3 ML: 2.5; .5 SOLUTION RESPIRATORY (INHALATION) at 12:10

## 2024-10-21 RX ADMIN — PIPERACILLIN SODIUM,TAZOBACTAM SODIUM 4.5 G: 4; .5 INJECTION, POWDER, FOR SOLUTION INTRAVENOUS at 01:10

## 2024-10-21 RX ADMIN — SEVELAMER CARBONATE 800 MG: 800 TABLET, FILM COATED ORAL at 06:10

## 2024-10-21 RX ADMIN — IPRATROPIUM BROMIDE AND ALBUTEROL SULFATE 3 ML: 2.5; .5 SOLUTION RESPIRATORY (INHALATION) at 01:10

## 2024-10-21 RX ADMIN — ASPIRIN 81 MG: 81 TABLET, COATED ORAL at 09:10

## 2024-10-21 RX ADMIN — GABAPENTIN 100 MG: 100 CAPSULE ORAL at 09:10

## 2024-10-21 RX ADMIN — CYPROHEPTADINE HYDROCHLORIDE 4 MG: 4 TABLET ORAL at 09:10

## 2024-10-21 RX ADMIN — HEPARIN SODIUM 5000 UNITS: 5000 INJECTION, SOLUTION INTRAVENOUS; SUBCUTANEOUS at 06:10

## 2024-10-21 RX ADMIN — ATORVASTATIN CALCIUM 80 MG: 80 TABLET, FILM COATED ORAL at 09:10

## 2024-10-21 RX ADMIN — HEPARIN SODIUM 5000 UNITS: 5000 INJECTION, SOLUTION INTRAVENOUS; SUBCUTANEOUS at 02:10

## 2024-10-21 RX ADMIN — HYDROCODONE BITARTRATE AND ACETAMINOPHEN 1 TABLET: 10; 325 TABLET ORAL at 09:10

## 2024-10-21 RX ADMIN — SEVELAMER CARBONATE 800 MG: 800 TABLET, FILM COATED ORAL at 09:10

## 2024-10-22 LAB
ALBUMIN SERPL BCP-MCNC: 2.1 G/DL (ref 3.5–5)
ANION GAP SERPL CALCULATED.3IONS-SCNC: 15 MMOL/L (ref 7–16)
ANISOCYTOSIS BLD QL SMEAR: ABNORMAL
BASOPHILS # BLD AUTO: 0.03 K/UL (ref 0–0.2)
BASOPHILS NFR BLD AUTO: 0.2 % (ref 0–1)
BUN SERPL-MCNC: 75 MG/DL (ref 7–18)
BUN/CREAT SERPL: 15 (ref 6–20)
CALCIUM SERPL-MCNC: 8.5 MG/DL (ref 8.5–10.1)
CHLORIDE SERPL-SCNC: 98 MMOL/L (ref 98–107)
CO2 SERPL-SCNC: 29 MMOL/L (ref 21–32)
CREAT SERPL-MCNC: 5.05 MG/DL (ref 0.7–1.3)
CRENATED CELLS: ABNORMAL
DIFFERENTIAL METHOD BLD: ABNORMAL
EGFR (NO RACE VARIABLE) (RUSH/TITUS): 11 ML/MIN/1.73M2
EOSINOPHIL # BLD AUTO: 0 K/UL (ref 0–0.5)
EOSINOPHIL NFR BLD AUTO: 0 % (ref 1–4)
ERYTHROCYTE [DISTWIDTH] IN BLOOD BY AUTOMATED COUNT: 20.6 % (ref 11.5–14.5)
GLUCOSE SERPL-MCNC: 96 MG/DL (ref 74–106)
HCT VFR BLD AUTO: 34.4 % (ref 40–54)
HGB BLD-MCNC: 10.1 G/DL (ref 13.5–18)
IMM GRANULOCYTES # BLD AUTO: 0.49 K/UL (ref 0–0.04)
IMM GRANULOCYTES NFR BLD: 2.6 % (ref 0–0.4)
LYMPHOCYTES # BLD AUTO: 1.14 K/UL (ref 1–4.8)
LYMPHOCYTES NFR BLD AUTO: 6.1 % (ref 27–41)
LYMPHOCYTES NFR BLD MANUAL: 8 % (ref 27–41)
MACROCYTES BLD QL SMEAR: ABNORMAL
MCH RBC QN AUTO: 29.4 PG (ref 27–31)
MCHC RBC AUTO-ENTMCNC: 29.4 G/DL (ref 32–36)
MCV RBC AUTO: 100.3 FL (ref 80–96)
MONOCYTES # BLD AUTO: 1.63 K/UL (ref 0–0.8)
MONOCYTES NFR BLD AUTO: 8.8 % (ref 2–6)
MONOCYTES NFR BLD MANUAL: 5 % (ref 2–6)
MPC BLD CALC-MCNC: 10.5 FL (ref 9.4–12.4)
MYELOCYTES NFR BLD MANUAL: 1 %
NEUTROPHILS # BLD AUTO: 15.25 K/UL (ref 1.8–7.7)
NEUTROPHILS NFR BLD AUTO: 82.3 % (ref 53–65)
NEUTS BAND NFR BLD MANUAL: 5 % (ref 1–5)
NEUTS SEG NFR BLD MANUAL: 81 % (ref 50–62)
NRBC # BLD AUTO: 0.42 X10E3/UL
NRBC BLD MANUAL-RTO: 5 /100 WBC
NRBC, AUTO (.00): 2.3 %
OVALOCYTES BLD QL SMEAR: ABNORMAL
PHOSPHATE SERPL-MCNC: 2.5 MG/DL (ref 2.5–4.5)
PLATELET # BLD AUTO: 175 K/UL (ref 150–400)
PLATELET MORPHOLOGY: ABNORMAL
POLYCHROMASIA BLD QL SMEAR: ABNORMAL
POTASSIUM SERPL-SCNC: 5.8 MMOL/L (ref 3.5–5.1)
RBC # BLD AUTO: 3.43 M/UL (ref 4.6–6.2)
SODIUM SERPL-SCNC: 136 MMOL/L (ref 136–145)
WBC # BLD AUTO: 18.54 K/UL (ref 4.5–11)

## 2024-10-22 PROCEDURE — 25000242 PHARM REV CODE 250 ALT 637 W/ HCPCS: Performed by: INTERNAL MEDICINE

## 2024-10-22 PROCEDURE — 36415 COLL VENOUS BLD VENIPUNCTURE: CPT | Performed by: HOSPITALIST

## 2024-10-22 PROCEDURE — 25000003 PHARM REV CODE 250

## 2024-10-22 PROCEDURE — 83615 LACTATE (LD) (LDH) ENZYME: CPT | Performed by: INTERNAL MEDICINE

## 2024-10-22 PROCEDURE — 94640 AIRWAY INHALATION TREATMENT: CPT

## 2024-10-22 PROCEDURE — 25000003 PHARM REV CODE 250: Performed by: STUDENT IN AN ORGANIZED HEALTH CARE EDUCATION/TRAINING PROGRAM

## 2024-10-22 PROCEDURE — 85025 COMPLETE CBC W/AUTO DIFF WBC: CPT | Performed by: HOSPITALIST

## 2024-10-22 PROCEDURE — 82746 ASSAY OF FOLIC ACID SERUM: CPT | Performed by: INTERNAL MEDICINE

## 2024-10-22 PROCEDURE — 82607 VITAMIN B-12: CPT | Performed by: INTERNAL MEDICINE

## 2024-10-22 PROCEDURE — 94761 N-INVAS EAR/PLS OXIMETRY MLT: CPT

## 2024-10-22 PROCEDURE — 63600175 PHARM REV CODE 636 W HCPCS: Performed by: INTERNAL MEDICINE

## 2024-10-22 PROCEDURE — 25000003 PHARM REV CODE 250: Performed by: INTERNAL MEDICINE

## 2024-10-22 PROCEDURE — 11000001 HC ACUTE MED/SURG PRIVATE ROOM

## 2024-10-22 PROCEDURE — 83010 ASSAY OF HAPTOGLOBIN QUANT: CPT | Performed by: INTERNAL MEDICINE

## 2024-10-22 PROCEDURE — 99900035 HC TECH TIME PER 15 MIN (STAT)

## 2024-10-22 PROCEDURE — 25000242 PHARM REV CODE 250 ALT 637 W/ HCPCS: Performed by: STUDENT IN AN ORGANIZED HEALTH CARE EDUCATION/TRAINING PROGRAM

## 2024-10-22 PROCEDURE — 63600175 PHARM REV CODE 636 W HCPCS: Performed by: STUDENT IN AN ORGANIZED HEALTH CARE EDUCATION/TRAINING PROGRAM

## 2024-10-22 PROCEDURE — 99232 SBSQ HOSP IP/OBS MODERATE 35: CPT | Mod: ,,, | Performed by: INTERNAL MEDICINE

## 2024-10-22 PROCEDURE — 25000003 PHARM REV CODE 250: Performed by: HOSPITALIST

## 2024-10-22 PROCEDURE — 80069 RENAL FUNCTION PANEL: CPT | Performed by: HOSPITALIST

## 2024-10-22 PROCEDURE — 27000221 HC OXYGEN, UP TO 24 HOURS

## 2024-10-22 RX ORDER — ONDANSETRON HYDROCHLORIDE 2 MG/ML
4 INJECTION, SOLUTION INTRAVENOUS EVERY 6 HOURS PRN
Status: DISCONTINUED | OUTPATIENT
Start: 2024-10-22 | End: 2024-10-29 | Stop reason: HOSPADM

## 2024-10-22 RX ORDER — LEVALBUTEROL INHALATION SOLUTION 1.25 MG/3ML
1.25 SOLUTION RESPIRATORY (INHALATION) EVERY 6 HOURS
Status: DISCONTINUED | OUTPATIENT
Start: 2024-10-22 | End: 2024-10-29 | Stop reason: HOSPADM

## 2024-10-22 RX ADMIN — MUPIROCIN: 20 OINTMENT TOPICAL at 09:10

## 2024-10-22 RX ADMIN — PIPERACILLIN SODIUM,TAZOBACTAM SODIUM 4.5 G: 4; .5 INJECTION, POWDER, FOR SOLUTION INTRAVENOUS at 04:10

## 2024-10-22 RX ADMIN — HEPARIN SODIUM 5000 UNITS: 5000 INJECTION, SOLUTION INTRAVENOUS; SUBCUTANEOUS at 02:10

## 2024-10-22 RX ADMIN — TICAGRELOR 90 MG: 90 TABLET ORAL at 09:10

## 2024-10-22 RX ADMIN — SEVELAMER CARBONATE 800 MG: 800 TABLET, FILM COATED ORAL at 09:10

## 2024-10-22 RX ADMIN — HYDROCODONE BITARTRATE AND ACETAMINOPHEN 1 TABLET: 10; 325 TABLET ORAL at 05:10

## 2024-10-22 RX ADMIN — CARVEDILOL 6.25 MG: 6.25 TABLET, FILM COATED ORAL at 09:10

## 2024-10-22 RX ADMIN — GABAPENTIN 100 MG: 100 CAPSULE ORAL at 09:10

## 2024-10-22 RX ADMIN — BUDESONIDE 0.5 MG: 0.5 INHALANT RESPIRATORY (INHALATION) at 08:10

## 2024-10-22 RX ADMIN — SEVELAMER CARBONATE 800 MG: 800 TABLET, FILM COATED ORAL at 12:10

## 2024-10-22 RX ADMIN — LEVALBUTEROL HYDROCHLORIDE 1.25 MG: 1.25 SOLUTION RESPIRATORY (INHALATION) at 11:10

## 2024-10-22 RX ADMIN — SEVELAMER CARBONATE 800 MG: 800 TABLET, FILM COATED ORAL at 05:10

## 2024-10-22 RX ADMIN — PIPERACILLIN SODIUM,TAZOBACTAM SODIUM 4.5 G: 4; .5 INJECTION, POWDER, FOR SOLUTION INTRAVENOUS at 02:10

## 2024-10-22 RX ADMIN — CYPROHEPTADINE HYDROCHLORIDE 4 MG: 4 TABLET ORAL at 09:10

## 2024-10-22 RX ADMIN — ATORVASTATIN CALCIUM 80 MG: 80 TABLET, FILM COATED ORAL at 09:10

## 2024-10-22 RX ADMIN — HEPARIN SODIUM 5000 UNITS: 5000 INJECTION, SOLUTION INTRAVENOUS; SUBCUTANEOUS at 05:10

## 2024-10-22 RX ADMIN — LEVALBUTEROL HYDROCHLORIDE 1.25 MG: 1.25 SOLUTION RESPIRATORY (INHALATION) at 08:10

## 2024-10-22 RX ADMIN — IPRATROPIUM BROMIDE AND ALBUTEROL SULFATE 3 ML: 2.5; .5 SOLUTION RESPIRATORY (INHALATION) at 01:10

## 2024-10-22 RX ADMIN — PREDNISONE 50 MG: 20 TABLET ORAL at 09:10

## 2024-10-22 RX ADMIN — ALUMINUM HYDROXIDE, MAGNESIUM HYDROXIDE, AND DIMETHICONE 30 ML: 400; 400; 40 SUSPENSION ORAL at 04:10

## 2024-10-22 RX ADMIN — IPRATROPIUM BROMIDE AND ALBUTEROL SULFATE 3 ML: 2.5; .5 SOLUTION RESPIRATORY (INHALATION) at 07:10

## 2024-10-22 RX ADMIN — ONDANSETRON 4 MG: 2 INJECTION INTRAMUSCULAR; INTRAVENOUS at 05:10

## 2024-10-22 RX ADMIN — ASPIRIN 81 MG: 81 TABLET, COATED ORAL at 09:10

## 2024-10-22 RX ADMIN — HEPARIN SODIUM 5000 UNITS: 5000 INJECTION, SOLUTION INTRAVENOUS; SUBCUTANEOUS at 09:10

## 2024-10-22 RX ADMIN — BUDESONIDE 0.5 MG: 0.5 INHALANT RESPIRATORY (INHALATION) at 07:10

## 2024-10-23 PROBLEM — R11.10 VOMITING: Status: ACTIVE | Noted: 2024-10-23

## 2024-10-23 PROBLEM — D53.9 MACROCYTIC ANEMIA: Status: ACTIVE | Noted: 2024-10-23

## 2024-10-23 LAB
ALBUMIN SERPL BCP-MCNC: 0.5 G/DL (ref 3.5–5)
ANION GAP SERPL CALCULATED.3IONS-SCNC: 17 MMOL/L (ref 7–16)
ANISOCYTOSIS BLD QL SMEAR: ABNORMAL
BACTERIA BLD CULT: NORMAL
BACTERIA BLD CULT: NORMAL
BASOPHILS # BLD AUTO: 0.03 K/UL (ref 0–0.2)
BASOPHILS NFR BLD AUTO: 0.2 % (ref 0–1)
BUN SERPL-MCNC: 132 MG/DL (ref 7–18)
BUN/CREAT SERPL: 21 (ref 6–20)
CALCIUM SERPL-MCNC: 8.4 MG/DL (ref 8.5–10.1)
CHLORIDE SERPL-SCNC: 99 MMOL/L (ref 98–107)
CO2 SERPL-SCNC: 27 MMOL/L (ref 21–32)
CREAT SERPL-MCNC: 6.26 MG/DL (ref 0.7–1.3)
CRENATED CELLS: ABNORMAL
DIFFERENTIAL METHOD BLD: ABNORMAL
EGFR (NO RACE VARIABLE) (RUSH/TITUS): 9 ML/MIN/1.73M2
EOSINOPHIL # BLD AUTO: 0 K/UL (ref 0–0.5)
EOSINOPHIL NFR BLD AUTO: 0 % (ref 1–4)
ERYTHROCYTE [DISTWIDTH] IN BLOOD BY AUTOMATED COUNT: 19.9 % (ref 11.5–14.5)
FOLATE SERPL-MCNC: 9.8 NG/ML (ref 3.1–17.5)
GLUCOSE SERPL-MCNC: 177 MG/DL (ref 70–105)
GLUCOSE SERPL-MCNC: 98 MG/DL (ref 74–106)
HAPTOGLOB SERPL NEPH-MCNC: 259 MG/DL (ref 30–200)
HCT VFR BLD AUTO: 26.1 % (ref 40–54)
HCT VFR BLD AUTO: 28.4 % (ref 40–54)
HGB BLD-MCNC: 7.3 G/DL (ref 13.5–18)
HGB BLD-MCNC: 8.2 G/DL (ref 13.5–18)
IMM GRANULOCYTES # BLD AUTO: 0.31 K/UL (ref 0–0.04)
IMM GRANULOCYTES NFR BLD: 1.7 % (ref 0–0.4)
LDH SERPL-CCNC: 334 U/L (ref 87–241)
LYMPHOCYTES # BLD AUTO: 0.86 K/UL (ref 1–4.8)
LYMPHOCYTES NFR BLD AUTO: 4.8 % (ref 27–41)
LYMPHOCYTES NFR BLD MANUAL: 4 % (ref 27–41)
MACROCYTES BLD QL SMEAR: ABNORMAL
MCH RBC QN AUTO: 29.6 PG (ref 27–31)
MCHC RBC AUTO-ENTMCNC: 28.9 G/DL (ref 32–36)
MCV RBC AUTO: 102.5 FL (ref 80–96)
MONOCYTES # BLD AUTO: 1.26 K/UL (ref 0–0.8)
MONOCYTES NFR BLD AUTO: 7 % (ref 2–6)
MONOCYTES NFR BLD MANUAL: 7 % (ref 2–6)
MPC BLD CALC-MCNC: 10.9 FL (ref 9.4–12.4)
NEUTROPHILS # BLD AUTO: 15.56 K/UL (ref 1.8–7.7)
NEUTROPHILS NFR BLD AUTO: 86.3 % (ref 53–65)
NEUTS BAND NFR BLD MANUAL: 2 % (ref 1–5)
NEUTS SEG NFR BLD MANUAL: 87 % (ref 50–62)
NRBC # BLD AUTO: 0.19 X10E3/UL
NRBC, AUTO (.00): 1.1 %
OCCULT BLOOD: POSITIVE
OVALOCYTES BLD QL SMEAR: ABNORMAL
PATH REV BLD -IMP: NORMAL
PHOSPHATE SERPL-MCNC: 4.8 MG/DL (ref 2.5–4.5)
PLATELET # BLD AUTO: 186 K/UL (ref 150–400)
PLATELET MORPHOLOGY: ABNORMAL
POLYCHROMASIA BLD QL SMEAR: ABNORMAL
POTASSIUM SERPL-SCNC: 6.5 MMOL/L (ref 3.5–5.1)
RBC # BLD AUTO: 2.77 M/UL (ref 4.6–6.2)
SODIUM SERPL-SCNC: 136 MMOL/L (ref 136–145)
VIT B12 SERPL-MCNC: 1122 PG/ML (ref 193–986)
WBC # BLD AUTO: 18.02 K/UL (ref 4.5–11)

## 2024-10-23 PROCEDURE — 25000003 PHARM REV CODE 250: Performed by: STUDENT IN AN ORGANIZED HEALTH CARE EDUCATION/TRAINING PROGRAM

## 2024-10-23 PROCEDURE — 63600175 PHARM REV CODE 636 W HCPCS: Performed by: INTERNAL MEDICINE

## 2024-10-23 PROCEDURE — 25000003 PHARM REV CODE 250: Performed by: INTERNAL MEDICINE

## 2024-10-23 PROCEDURE — 99233 SBSQ HOSP IP/OBS HIGH 50: CPT | Mod: ,,, | Performed by: INTERNAL MEDICINE

## 2024-10-23 PROCEDURE — 94640 AIRWAY INHALATION TREATMENT: CPT

## 2024-10-23 PROCEDURE — 90935 HEMODIALYSIS ONE EVALUATION: CPT

## 2024-10-23 PROCEDURE — 80069 RENAL FUNCTION PANEL: CPT | Performed by: HOSPITALIST

## 2024-10-23 PROCEDURE — 82962 GLUCOSE BLOOD TEST: CPT

## 2024-10-23 PROCEDURE — 30233N1 TRANSFUSION OF NONAUTOLOGOUS RED BLOOD CELLS INTO PERIPHERAL VEIN, PERCUTANEOUS APPROACH: ICD-10-PCS | Performed by: INTERNAL MEDICINE

## 2024-10-23 PROCEDURE — 99900035 HC TECH TIME PER 15 MIN (STAT)

## 2024-10-23 PROCEDURE — 11000001 HC ACUTE MED/SURG PRIVATE ROOM

## 2024-10-23 PROCEDURE — 94761 N-INVAS EAR/PLS OXIMETRY MLT: CPT

## 2024-10-23 PROCEDURE — 36415 COLL VENOUS BLD VENIPUNCTURE: CPT | Performed by: INTERNAL MEDICINE

## 2024-10-23 PROCEDURE — 25000242 PHARM REV CODE 250 ALT 637 W/ HCPCS: Performed by: STUDENT IN AN ORGANIZED HEALTH CARE EDUCATION/TRAINING PROGRAM

## 2024-10-23 PROCEDURE — 25000003 PHARM REV CODE 250: Performed by: HOSPITALIST

## 2024-10-23 PROCEDURE — 63600175 PHARM REV CODE 636 W HCPCS: Performed by: STUDENT IN AN ORGANIZED HEALTH CARE EDUCATION/TRAINING PROGRAM

## 2024-10-23 PROCEDURE — 25000242 PHARM REV CODE 250 ALT 637 W/ HCPCS: Performed by: INTERNAL MEDICINE

## 2024-10-23 PROCEDURE — 85018 HEMOGLOBIN: CPT | Performed by: INTERNAL MEDICINE

## 2024-10-23 PROCEDURE — 94660 CPAP INITIATION&MGMT: CPT

## 2024-10-23 PROCEDURE — 85025 COMPLETE CBC W/AUTO DIFF WBC: CPT | Performed by: HOSPITALIST

## 2024-10-23 PROCEDURE — 82272 OCCULT BLD FECES 1-3 TESTS: CPT | Performed by: INTERNAL MEDICINE

## 2024-10-23 PROCEDURE — 27000221 HC OXYGEN, UP TO 24 HOURS

## 2024-10-23 PROCEDURE — 87641 MR-STAPH DNA AMP PROBE: CPT | Performed by: INTERNAL MEDICINE

## 2024-10-23 PROCEDURE — 36415 COLL VENOUS BLD VENIPUNCTURE: CPT | Performed by: HOSPITALIST

## 2024-10-23 PROCEDURE — 80504 PATH CLIN CONSLTJ MOD 21-40: CPT | Mod: ,,, | Performed by: PATHOLOGY

## 2024-10-23 PROCEDURE — 85014 HEMATOCRIT: CPT | Performed by: INTERNAL MEDICINE

## 2024-10-23 RX ORDER — HYDROCODONE BITARTRATE AND ACETAMINOPHEN 500; 5 MG/1; MG/1
TABLET ORAL
Status: DISCONTINUED | OUTPATIENT
Start: 2024-10-23 | End: 2024-10-24

## 2024-10-23 RX ORDER — LINEZOLID 2 MG/ML
600 INJECTION, SOLUTION INTRAVENOUS
Status: DISCONTINUED | OUTPATIENT
Start: 2024-10-23 | End: 2024-10-27

## 2024-10-23 RX ORDER — PANTOPRAZOLE SODIUM 40 MG/10ML
40 INJECTION, POWDER, LYOPHILIZED, FOR SOLUTION INTRAVENOUS 2 TIMES DAILY
Status: DISCONTINUED | OUTPATIENT
Start: 2024-10-23 | End: 2024-10-25

## 2024-10-23 RX ORDER — BISACODYL 10 MG/1
10 SUPPOSITORY RECTAL DAILY
Status: DISCONTINUED | OUTPATIENT
Start: 2024-10-23 | End: 2024-10-25

## 2024-10-23 RX ORDER — POLYETHYLENE GLYCOL 3350 17 G/17G
17 POWDER, FOR SOLUTION ORAL DAILY
Status: DISCONTINUED | OUTPATIENT
Start: 2024-10-23 | End: 2024-10-25

## 2024-10-23 RX ADMIN — BUDESONIDE 0.5 MG: 0.5 INHALANT RESPIRATORY (INHALATION) at 07:10

## 2024-10-23 RX ADMIN — TICAGRELOR 90 MG: 90 TABLET ORAL at 08:10

## 2024-10-23 RX ADMIN — ATORVASTATIN CALCIUM 80 MG: 80 TABLET, FILM COATED ORAL at 08:10

## 2024-10-23 RX ADMIN — HYDROCODONE BITARTRATE AND ACETAMINOPHEN 1 TABLET: 10; 325 TABLET ORAL at 03:10

## 2024-10-23 RX ADMIN — LEVALBUTEROL HYDROCHLORIDE 1.25 MG: 1.25 SOLUTION RESPIRATORY (INHALATION) at 07:10

## 2024-10-23 RX ADMIN — HEPARIN SODIUM 5000 UNITS: 5000 INJECTION, SOLUTION INTRAVENOUS; SUBCUTANEOUS at 05:10

## 2024-10-23 RX ADMIN — PIPERACILLIN SODIUM,TAZOBACTAM SODIUM 4.5 G: 4; .5 INJECTION, POWDER, FOR SOLUTION INTRAVENOUS at 04:10

## 2024-10-23 RX ADMIN — CARVEDILOL 6.25 MG: 6.25 TABLET, FILM COATED ORAL at 08:10

## 2024-10-23 RX ADMIN — PIPERACILLIN SODIUM,TAZOBACTAM SODIUM 4.5 G: 4; .5 INJECTION, POWDER, FOR SOLUTION INTRAVENOUS at 03:10

## 2024-10-23 RX ADMIN — POLYETHYLENE GLYCOL 3350 17 G: 17 POWDER, FOR SOLUTION ORAL at 03:10

## 2024-10-23 RX ADMIN — PANTOPRAZOLE SODIUM 40 MG: 40 INJECTION, POWDER, LYOPHILIZED, FOR SOLUTION INTRAVENOUS at 04:10

## 2024-10-23 RX ADMIN — ASPIRIN 81 MG: 81 TABLET, COATED ORAL at 08:10

## 2024-10-23 RX ADMIN — LEVALBUTEROL HYDROCHLORIDE 1.25 MG: 1.25 SOLUTION RESPIRATORY (INHALATION) at 12:10

## 2024-10-23 RX ADMIN — GABAPENTIN 100 MG: 100 CAPSULE ORAL at 08:10

## 2024-10-23 RX ADMIN — CYPROHEPTADINE HYDROCHLORIDE 4 MG: 4 TABLET ORAL at 08:10

## 2024-10-23 RX ADMIN — BISACODYL 10 MG: 10 SUPPOSITORY RECTAL at 03:10

## 2024-10-23 RX ADMIN — SEVELAMER CARBONATE 800 MG: 800 TABLET, FILM COATED ORAL at 08:10

## 2024-10-23 RX ADMIN — SODIUM CHLORIDE 2000 ML: 9 INJECTION, SOLUTION INTRAVENOUS at 01:10

## 2024-10-23 RX ADMIN — LINEZOLID 600 MG: 600 INJECTION, SOLUTION INTRAVENOUS at 03:10

## 2024-10-24 PROBLEM — D62 ACUTE BLOOD LOSS ANEMIA: Status: ACTIVE | Noted: 2024-10-23

## 2024-10-24 PROBLEM — K92.2 ACUTE UPPER GI BLEED: Status: ACTIVE | Noted: 2024-10-23

## 2024-10-24 PROBLEM — K92.1 MELENA: Status: ACTIVE | Noted: 2024-10-24

## 2024-10-24 PROBLEM — Z66 DNR (DO NOT RESUSCITATE): Status: ACTIVE | Noted: 2024-10-24

## 2024-10-24 PROBLEM — K92.0 COFFEE GROUND EMESIS: Status: ACTIVE | Noted: 2024-10-24

## 2024-10-24 LAB
ABO + RH BLD: NORMAL
BLD PROD TYP BPU: NORMAL
BLOOD UNIT EXPIRATION DATE: NORMAL
BLOOD UNIT TYPE CODE: 7300
CROSSMATCH INTERPRETATION: NORMAL
DISPENSE STATUS: NORMAL
HCT VFR BLD AUTO: 27.9 % (ref 40–54)
HCT VFR BLD AUTO: 33.1 % (ref 40–54)
HGB BLD-MCNC: 8.3 G/DL (ref 13.5–18)
HGB BLD-MCNC: 9.7 G/DL (ref 13.5–18)
INDIRECT COOMBS: NORMAL
METHICILLIN RESISTANT STAPHYLOCOCCUS AUREUS: POSITIVE
RH BLD: NORMAL
SPECIMEN OUTDATE: NORMAL
UNIT NUMBER: NORMAL

## 2024-10-24 PROCEDURE — 94761 N-INVAS EAR/PLS OXIMETRY MLT: CPT

## 2024-10-24 PROCEDURE — 25000003 PHARM REV CODE 250: Performed by: STUDENT IN AN ORGANIZED HEALTH CARE EDUCATION/TRAINING PROGRAM

## 2024-10-24 PROCEDURE — 85014 HEMATOCRIT: CPT | Performed by: INTERNAL MEDICINE

## 2024-10-24 PROCEDURE — 86850 RBC ANTIBODY SCREEN: CPT | Performed by: INTERNAL MEDICINE

## 2024-10-24 PROCEDURE — 86923 COMPATIBILITY TEST ELECTRIC: CPT | Performed by: INTERNAL MEDICINE

## 2024-10-24 PROCEDURE — 36415 COLL VENOUS BLD VENIPUNCTURE: CPT | Performed by: INTERNAL MEDICINE

## 2024-10-24 PROCEDURE — 25000242 PHARM REV CODE 250 ALT 637 W/ HCPCS: Performed by: STUDENT IN AN ORGANIZED HEALTH CARE EDUCATION/TRAINING PROGRAM

## 2024-10-24 PROCEDURE — 85018 HEMOGLOBIN: CPT | Performed by: INTERNAL MEDICINE

## 2024-10-24 PROCEDURE — 63600175 PHARM REV CODE 636 W HCPCS: Performed by: INTERNAL MEDICINE

## 2024-10-24 PROCEDURE — 99233 SBSQ HOSP IP/OBS HIGH 50: CPT | Mod: ,,, | Performed by: INTERNAL MEDICINE

## 2024-10-24 PROCEDURE — 25000242 PHARM REV CODE 250 ALT 637 W/ HCPCS: Performed by: INTERNAL MEDICINE

## 2024-10-24 PROCEDURE — 99900031 HC PATIENT EDUCATION (STAT)

## 2024-10-24 PROCEDURE — 94640 AIRWAY INHALATION TREATMENT: CPT

## 2024-10-24 PROCEDURE — 99223 1ST HOSP IP/OBS HIGH 75: CPT | Mod: ,,, | Performed by: INTERNAL MEDICINE

## 2024-10-24 PROCEDURE — P9016 RBC LEUKOCYTES REDUCED: HCPCS | Performed by: INTERNAL MEDICINE

## 2024-10-24 PROCEDURE — 94660 CPAP INITIATION&MGMT: CPT

## 2024-10-24 PROCEDURE — 99900035 HC TECH TIME PER 15 MIN (STAT)

## 2024-10-24 PROCEDURE — 86900 BLOOD TYPING SEROLOGIC ABO: CPT | Performed by: INTERNAL MEDICINE

## 2024-10-24 PROCEDURE — 25000003 PHARM REV CODE 250: Performed by: INTERNAL MEDICINE

## 2024-10-24 PROCEDURE — 86901 BLOOD TYPING SEROLOGIC RH(D): CPT | Performed by: INTERNAL MEDICINE

## 2024-10-24 PROCEDURE — 27000221 HC OXYGEN, UP TO 24 HOURS

## 2024-10-24 PROCEDURE — 11000001 HC ACUTE MED/SURG PRIVATE ROOM

## 2024-10-24 RX ORDER — MIDODRINE HYDROCHLORIDE 5 MG/1
5 TABLET ORAL 2 TIMES DAILY WITH MEALS
Status: DISCONTINUED | OUTPATIENT
Start: 2024-10-24 | End: 2024-10-25

## 2024-10-24 RX ORDER — CARVEDILOL 3.12 MG/1
3.12 TABLET ORAL 2 TIMES DAILY
Status: DISCONTINUED | OUTPATIENT
Start: 2024-10-24 | End: 2024-10-25

## 2024-10-24 RX ORDER — CARVEDILOL 6.25 MG/1
6.25 TABLET ORAL 2 TIMES DAILY
Status: DISCONTINUED | OUTPATIENT
Start: 2024-10-24 | End: 2024-10-24

## 2024-10-24 RX ADMIN — PANTOPRAZOLE SODIUM 40 MG: 40 INJECTION, POWDER, LYOPHILIZED, FOR SOLUTION INTRAVENOUS at 11:10

## 2024-10-24 RX ADMIN — PANTOPRAZOLE SODIUM 40 MG: 40 INJECTION, POWDER, LYOPHILIZED, FOR SOLUTION INTRAVENOUS at 10:10

## 2024-10-24 RX ADMIN — LEVALBUTEROL HYDROCHLORIDE 1.25 MG: 1.25 SOLUTION RESPIRATORY (INHALATION) at 07:10

## 2024-10-24 RX ADMIN — BUDESONIDE 0.5 MG: 0.5 INHALANT RESPIRATORY (INHALATION) at 07:10

## 2024-10-24 RX ADMIN — PIPERACILLIN AND TAZOBACTAM 4.5 G: 4; .5 INJECTION, POWDER, FOR SOLUTION INTRAVENOUS; PARENTERAL at 11:10

## 2024-10-24 RX ADMIN — LEVALBUTEROL HYDROCHLORIDE 1.25 MG: 1.25 SOLUTION RESPIRATORY (INHALATION) at 12:10

## 2024-10-24 RX ADMIN — CARVEDILOL 3.12 MG: 3.12 TABLET, FILM COATED ORAL at 11:10

## 2024-10-24 RX ADMIN — LINEZOLID 600 MG: 600 INJECTION, SOLUTION INTRAVENOUS at 03:10

## 2024-10-24 RX ADMIN — PIPERACILLIN AND TAZOBACTAM 4.5 G: 4; .5 INJECTION, POWDER, FOR SOLUTION INTRAVENOUS; PARENTERAL at 10:10

## 2024-10-24 RX ADMIN — SODIUM CHLORIDE: 9 INJECTION, SOLUTION INTRAVENOUS at 02:10

## 2024-10-24 RX ADMIN — LINEZOLID 600 MG: 600 INJECTION, SOLUTION INTRAVENOUS at 01:10

## 2024-10-24 RX ADMIN — GABAPENTIN 100 MG: 100 CAPSULE ORAL at 11:10

## 2024-10-24 RX ADMIN — SEVELAMER CARBONATE 800 MG: 800 TABLET, FILM COATED ORAL at 06:10

## 2024-10-24 RX ADMIN — MIDODRINE HYDROCHLORIDE 5 MG: 5 TABLET ORAL at 03:10

## 2024-10-25 ENCOUNTER — ANESTHESIA (OUTPATIENT)
Dept: GASTROENTEROLOGY | Facility: HOSPITAL | Age: 74
DRG: 871 | End: 2024-10-25
Payer: MEDICARE

## 2024-10-25 ENCOUNTER — ANESTHESIA EVENT (OUTPATIENT)
Dept: GASTROENTEROLOGY | Facility: HOSPITAL | Age: 74
DRG: 871 | End: 2024-10-25
Payer: MEDICARE

## 2024-10-25 LAB
ALBUMIN SERPL BCP-MCNC: 1.9 G/DL (ref 3.5–5)
ANION GAP SERPL CALCULATED.3IONS-SCNC: 15 MMOL/L (ref 7–16)
ANISOCYTOSIS BLD QL SMEAR: ABNORMAL
BASOPHILS # BLD AUTO: 0 K/UL (ref 0–0.2)
BASOPHILS NFR BLD AUTO: 0 % (ref 0–1)
BUN SERPL-MCNC: 50 MG/DL (ref 7–18)
BUN/CREAT SERPL: 11 (ref 6–20)
CALCIUM SERPL-MCNC: 7.6 MG/DL (ref 8.5–10.1)
CHLORIDE SERPL-SCNC: 101 MMOL/L (ref 98–107)
CO2 SERPL-SCNC: 26 MMOL/L (ref 21–32)
CREAT SERPL-MCNC: 4.55 MG/DL (ref 0.7–1.3)
CRENATED CELLS: ABNORMAL
DACRYOCYTES BLD QL SMEAR: ABNORMAL
DIFFERENTIAL METHOD BLD: ABNORMAL
EGFR (NO RACE VARIABLE) (RUSH/TITUS): 13 ML/MIN/1.73M2
EOSINOPHIL # BLD AUTO: 0.04 K/UL (ref 0–0.5)
EOSINOPHIL NFR BLD AUTO: 0.6 % (ref 1–4)
ERYTHROCYTE [DISTWIDTH] IN BLOOD BY AUTOMATED COUNT: 19 % (ref 11.5–14.5)
GLUCOSE SERPL-MCNC: 106 MG/DL (ref 74–106)
GLUCOSE SERPL-MCNC: 68 MG/DL (ref 70–105)
GLUCOSE SERPL-MCNC: 89 MG/DL (ref 70–105)
HCT VFR BLD AUTO: 27.1 % (ref 40–54)
HCT VFR BLD AUTO: 33.7 % (ref 40–54)
HGB BLD-MCNC: 8.1 G/DL (ref 13.5–18)
HGB BLD-MCNC: 9.7 G/DL (ref 13.5–18)
IMM GRANULOCYTES # BLD AUTO: 0.12 K/UL (ref 0–0.04)
IMM GRANULOCYTES NFR BLD: 1.9 % (ref 0–0.4)
LYMPHOCYTES # BLD AUTO: 0.29 K/UL (ref 1–4.8)
LYMPHOCYTES NFR BLD AUTO: 4.5 % (ref 27–41)
LYMPHOCYTES NFR BLD MANUAL: 5 % (ref 27–41)
MACROCYTES BLD QL SMEAR: ABNORMAL
MCH RBC QN AUTO: 28.9 PG (ref 27–31)
MCHC RBC AUTO-ENTMCNC: 29.9 G/DL (ref 32–36)
MCV RBC AUTO: 96.8 FL (ref 80–96)
MONOCYTES # BLD AUTO: 0.29 K/UL (ref 0–0.8)
MONOCYTES NFR BLD AUTO: 4.5 % (ref 2–6)
MONOCYTES NFR BLD MANUAL: 5 % (ref 2–6)
MPC BLD CALC-MCNC: 10.2 FL (ref 9.4–12.4)
NEUTROPHILS # BLD AUTO: 5.74 K/UL (ref 1.8–7.7)
NEUTROPHILS NFR BLD AUTO: 88.5 % (ref 53–65)
NEUTS SEG NFR BLD MANUAL: 90 % (ref 50–62)
NRBC # BLD AUTO: 0.08 X10E3/UL
NRBC BLD MANUAL-RTO: 1 /100 WBC
NRBC, AUTO (.00): 1.2 %
OVALOCYTES BLD QL SMEAR: ABNORMAL
PHOSPHATE SERPL-MCNC: 4.1 MG/DL (ref 2.5–4.5)
PLATELET # BLD AUTO: 143 K/UL (ref 150–400)
PLATELET MORPHOLOGY: ABNORMAL
POLYCHROMASIA BLD QL SMEAR: ABNORMAL
POTASSIUM SERPL-SCNC: 3.5 MMOL/L (ref 3.5–5.1)
RBC # BLD AUTO: 2.8 M/UL (ref 4.6–6.2)
SODIUM SERPL-SCNC: 138 MMOL/L (ref 136–145)
WBC # BLD AUTO: 6.48 K/UL (ref 4.5–11)

## 2024-10-25 PROCEDURE — D9220A PRA ANESTHESIA: Mod: ,,, | Performed by: NURSE ANESTHETIST, CERTIFIED REGISTERED

## 2024-10-25 PROCEDURE — 36415 COLL VENOUS BLD VENIPUNCTURE: CPT | Performed by: INTERNAL MEDICINE

## 2024-10-25 PROCEDURE — 43255 EGD CONTROL BLEEDING ANY: CPT | Performed by: INTERNAL MEDICINE

## 2024-10-25 PROCEDURE — 27000284 HC CANNULA NASAL: Performed by: NURSE ANESTHETIST, CERTIFIED REGISTERED

## 2024-10-25 PROCEDURE — 25000242 PHARM REV CODE 250 ALT 637 W/ HCPCS: Performed by: STUDENT IN AN ORGANIZED HEALTH CARE EDUCATION/TRAINING PROGRAM

## 2024-10-25 PROCEDURE — 94660 CPAP INITIATION&MGMT: CPT

## 2024-10-25 PROCEDURE — S5010 5% DEXTROSE AND 0.45% SALINE: HCPCS | Performed by: INTERNAL MEDICINE

## 2024-10-25 PROCEDURE — 43255 EGD CONTROL BLEEDING ANY: CPT | Mod: ,,, | Performed by: INTERNAL MEDICINE

## 2024-10-25 PROCEDURE — 25000003 PHARM REV CODE 250: Performed by: STUDENT IN AN ORGANIZED HEALTH CARE EDUCATION/TRAINING PROGRAM

## 2024-10-25 PROCEDURE — 94640 AIRWAY INHALATION TREATMENT: CPT

## 2024-10-25 PROCEDURE — 11000001 HC ACUTE MED/SURG PRIVATE ROOM

## 2024-10-25 PROCEDURE — 80069 RENAL FUNCTION PANEL: CPT | Performed by: INTERNAL MEDICINE

## 2024-10-25 PROCEDURE — 82962 GLUCOSE BLOOD TEST: CPT

## 2024-10-25 PROCEDURE — 90935 HEMODIALYSIS ONE EVALUATION: CPT

## 2024-10-25 PROCEDURE — 85014 HEMATOCRIT: CPT | Performed by: INTERNAL MEDICINE

## 2024-10-25 PROCEDURE — 27000221 HC OXYGEN, UP TO 24 HOURS

## 2024-10-25 PROCEDURE — 85018 HEMOGLOBIN: CPT | Performed by: INTERNAL MEDICINE

## 2024-10-25 PROCEDURE — 25000003 PHARM REV CODE 250: Performed by: NURSE ANESTHETIST, CERTIFIED REGISTERED

## 2024-10-25 PROCEDURE — 63600175 PHARM REV CODE 636 W HCPCS: Performed by: NURSE ANESTHETIST, CERTIFIED REGISTERED

## 2024-10-25 PROCEDURE — 25000003 PHARM REV CODE 250: Performed by: INTERNAL MEDICINE

## 2024-10-25 PROCEDURE — 99900035 HC TECH TIME PER 15 MIN (STAT)

## 2024-10-25 PROCEDURE — 99233 SBSQ HOSP IP/OBS HIGH 50: CPT | Mod: ,,, | Performed by: INTERNAL MEDICINE

## 2024-10-25 PROCEDURE — 94761 N-INVAS EAR/PLS OXIMETRY MLT: CPT

## 2024-10-25 PROCEDURE — 25000242 PHARM REV CODE 250 ALT 637 W/ HCPCS: Performed by: INTERNAL MEDICINE

## 2024-10-25 PROCEDURE — 85025 COMPLETE CBC W/AUTO DIFF WBC: CPT | Performed by: INTERNAL MEDICINE

## 2024-10-25 PROCEDURE — 0W3P8ZZ CONTROL BLEEDING IN GASTROINTESTINAL TRACT, VIA NATURAL OR ARTIFICIAL OPENING ENDOSCOPIC: ICD-10-PCS | Performed by: INTERNAL MEDICINE

## 2024-10-25 PROCEDURE — 63600175 PHARM REV CODE 636 W HCPCS: Performed by: INTERNAL MEDICINE

## 2024-10-25 RX ORDER — GABAPENTIN 100 MG/1
100 CAPSULE ORAL 2 TIMES DAILY
Status: DISCONTINUED | OUTPATIENT
Start: 2024-10-26 | End: 2024-10-29 | Stop reason: HOSPADM

## 2024-10-25 RX ORDER — LIDOCAINE HYDROCHLORIDE 20 MG/ML
INJECTION, SOLUTION EPIDURAL; INFILTRATION; INTRACAUDAL; PERINEURAL
Status: DISCONTINUED | OUTPATIENT
Start: 2024-10-25 | End: 2024-10-25

## 2024-10-25 RX ORDER — HYDROCODONE BITARTRATE AND ACETAMINOPHEN 10; 325 MG/1; MG/1
1 TABLET ORAL EVERY 8 HOURS PRN
Status: DISCONTINUED | OUTPATIENT
Start: 2024-10-26 | End: 2024-10-29 | Stop reason: HOSPADM

## 2024-10-25 RX ORDER — DEXTROSE MONOHYDRATE AND SODIUM CHLORIDE 5; .45 G/100ML; G/100ML
INJECTION, SOLUTION INTRAVENOUS CONTINUOUS
Status: DISCONTINUED | OUTPATIENT
Start: 2024-10-25 | End: 2024-10-26

## 2024-10-25 RX ORDER — PHENYLEPHRINE HYDROCHLORIDE 10 MG/ML
INJECTION INTRAVENOUS
Status: DISCONTINUED | OUTPATIENT
Start: 2024-10-25 | End: 2024-10-25

## 2024-10-25 RX ORDER — ETOMIDATE 2 MG/ML
INJECTION INTRAVENOUS
Status: DISCONTINUED | OUTPATIENT
Start: 2024-10-25 | End: 2024-10-25

## 2024-10-25 RX ORDER — PROPOFOL 10 MG/ML
VIAL (ML) INTRAVENOUS
Status: DISCONTINUED | OUTPATIENT
Start: 2024-10-25 | End: 2024-10-25

## 2024-10-25 RX ORDER — CARVEDILOL 3.12 MG/1
3.12 TABLET ORAL 2 TIMES DAILY
Status: DISCONTINUED | OUTPATIENT
Start: 2024-10-26 | End: 2024-10-29 | Stop reason: HOSPADM

## 2024-10-25 RX ORDER — SEVELAMER CARBONATE 800 MG/1
800 TABLET, FILM COATED ORAL
Status: DISCONTINUED | OUTPATIENT
Start: 2024-10-26 | End: 2024-10-29 | Stop reason: HOSPADM

## 2024-10-25 RX ADMIN — LINEZOLID 600 MG: 600 INJECTION, SOLUTION INTRAVENOUS at 03:10

## 2024-10-25 RX ADMIN — BUDESONIDE 0.5 MG: 0.5 INHALANT RESPIRATORY (INHALATION) at 07:10

## 2024-10-25 RX ADMIN — GABAPENTIN 100 MG: 100 CAPSULE ORAL at 09:10

## 2024-10-25 RX ADMIN — SODIUM CHLORIDE 8 MG/HR: 900 INJECTION INTRAVENOUS at 04:10

## 2024-10-25 RX ADMIN — PIPERACILLIN AND TAZOBACTAM 4.5 G: 4; .5 INJECTION, POWDER, FOR SOLUTION INTRAVENOUS; PARENTERAL at 10:10

## 2024-10-25 RX ADMIN — PIPERACILLIN AND TAZOBACTAM 4.5 G: 4; .5 INJECTION, POWDER, FOR SOLUTION INTRAVENOUS; PARENTERAL at 09:10

## 2024-10-25 RX ADMIN — POLYETHYLENE GLYCOL 3350 17 G: 17 POWDER, FOR SOLUTION ORAL at 08:10

## 2024-10-25 RX ADMIN — LEVALBUTEROL HYDROCHLORIDE 1.25 MG: 1.25 SOLUTION RESPIRATORY (INHALATION) at 07:10

## 2024-10-25 RX ADMIN — SODIUM CHLORIDE 1000 ML: 9 INJECTION, SOLUTION INTRAVENOUS at 09:10

## 2024-10-25 RX ADMIN — MIDODRINE HYDROCHLORIDE 5 MG: 5 TABLET ORAL at 09:10

## 2024-10-25 RX ADMIN — PHENYLEPHRINE HYDROCHLORIDE 400 MCG: 10 INJECTION INTRAVENOUS at 01:10

## 2024-10-25 RX ADMIN — ATORVASTATIN CALCIUM 80 MG: 80 TABLET, FILM COATED ORAL at 09:10

## 2024-10-25 RX ADMIN — PROPOFOL 10 MG: 10 INJECTION, EMULSION INTRAVENOUS at 01:10

## 2024-10-25 RX ADMIN — PHENYLEPHRINE HYDROCHLORIDE 300 MCG: 10 INJECTION INTRAVENOUS at 02:10

## 2024-10-25 RX ADMIN — ETOMIDATE 2 MG: 2 INJECTION INTRAVENOUS at 01:10

## 2024-10-25 RX ADMIN — DEXTROSE AND SODIUM CHLORIDE: 5; 450 INJECTION, SOLUTION INTRAVENOUS at 09:10

## 2024-10-25 RX ADMIN — PANTOPRAZOLE SODIUM 40 MG: 40 INJECTION, POWDER, LYOPHILIZED, FOR SOLUTION INTRAVENOUS at 09:10

## 2024-10-25 RX ADMIN — LIDOCAINE HYDROCHLORIDE 50 MG: 20 INJECTION, SOLUTION INTRAVENOUS at 01:10

## 2024-10-25 RX ADMIN — SODIUM CHLORIDE 8 MG/HR: 900 INJECTION INTRAVENOUS at 08:10

## 2024-10-25 RX ADMIN — LEVALBUTEROL HYDROCHLORIDE 1.25 MG: 1.25 SOLUTION RESPIRATORY (INHALATION) at 12:10

## 2024-10-25 RX ADMIN — SEVELAMER CARBONATE 800 MG: 800 TABLET, FILM COATED ORAL at 09:10

## 2024-10-26 PROBLEM — E87.5 HYPERKALEMIA: Status: RESOLVED | Noted: 2024-08-26 | Resolved: 2024-10-26

## 2024-10-26 LAB
ALBUMIN SERPL BCP-MCNC: 1.7 G/DL (ref 3.5–5)
ANION GAP SERPL CALCULATED.3IONS-SCNC: 17 MMOL/L (ref 7–16)
BUN SERPL-MCNC: 33 MG/DL (ref 7–18)
BUN/CREAT SERPL: 8 (ref 6–20)
CALCIUM SERPL-MCNC: 8.2 MG/DL (ref 8.5–10.1)
CHLORIDE SERPL-SCNC: 101 MMOL/L (ref 98–107)
CO2 SERPL-SCNC: 22 MMOL/L (ref 21–32)
CREAT SERPL-MCNC: 4.01 MG/DL (ref 0.7–1.3)
EGFR (NO RACE VARIABLE) (RUSH/TITUS): 15 ML/MIN/1.73M2
GLUCOSE SERPL-MCNC: 110 MG/DL (ref 70–105)
GLUCOSE SERPL-MCNC: 95 MG/DL (ref 74–106)
HCT VFR BLD AUTO: 32.2 % (ref 40–54)
HGB BLD-MCNC: 9 G/DL (ref 13.5–18)
PHOSPHATE SERPL-MCNC: 4.5 MG/DL (ref 2.5–4.5)
POTASSIUM SERPL-SCNC: 4 MMOL/L (ref 3.5–5.1)
SODIUM SERPL-SCNC: 136 MMOL/L (ref 136–145)

## 2024-10-26 PROCEDURE — 25000242 PHARM REV CODE 250 ALT 637 W/ HCPCS: Performed by: INTERNAL MEDICINE

## 2024-10-26 PROCEDURE — 99233 SBSQ HOSP IP/OBS HIGH 50: CPT | Mod: ,,, | Performed by: INTERNAL MEDICINE

## 2024-10-26 PROCEDURE — 85018 HEMOGLOBIN: CPT | Performed by: INTERNAL MEDICINE

## 2024-10-26 PROCEDURE — 25000242 PHARM REV CODE 250 ALT 637 W/ HCPCS: Performed by: STUDENT IN AN ORGANIZED HEALTH CARE EDUCATION/TRAINING PROGRAM

## 2024-10-26 PROCEDURE — 94640 AIRWAY INHALATION TREATMENT: CPT

## 2024-10-26 PROCEDURE — 27000221 HC OXYGEN, UP TO 24 HOURS

## 2024-10-26 PROCEDURE — 25000003 PHARM REV CODE 250: Performed by: INTERNAL MEDICINE

## 2024-10-26 PROCEDURE — 94761 N-INVAS EAR/PLS OXIMETRY MLT: CPT

## 2024-10-26 PROCEDURE — 85014 HEMATOCRIT: CPT | Performed by: INTERNAL MEDICINE

## 2024-10-26 PROCEDURE — 11000001 HC ACUTE MED/SURG PRIVATE ROOM

## 2024-10-26 PROCEDURE — 99232 SBSQ HOSP IP/OBS MODERATE 35: CPT | Mod: ,,, | Performed by: INTERNAL MEDICINE

## 2024-10-26 PROCEDURE — 80069 RENAL FUNCTION PANEL: CPT | Performed by: INTERNAL MEDICINE

## 2024-10-26 PROCEDURE — 82962 GLUCOSE BLOOD TEST: CPT

## 2024-10-26 PROCEDURE — 36415 COLL VENOUS BLD VENIPUNCTURE: CPT | Performed by: INTERNAL MEDICINE

## 2024-10-26 PROCEDURE — 99900035 HC TECH TIME PER 15 MIN (STAT)

## 2024-10-26 PROCEDURE — 63600175 PHARM REV CODE 636 W HCPCS: Performed by: INTERNAL MEDICINE

## 2024-10-26 RX ORDER — MIDODRINE HYDROCHLORIDE 5 MG/1
5 TABLET ORAL 2 TIMES DAILY WITH MEALS
Status: DISCONTINUED | OUTPATIENT
Start: 2024-10-26 | End: 2024-10-29 | Stop reason: HOSPADM

## 2024-10-26 RX ORDER — SUCRALFATE 1 G/10ML
1 SUSPENSION ORAL
Status: DISCONTINUED | OUTPATIENT
Start: 2024-10-26 | End: 2024-10-29 | Stop reason: HOSPADM

## 2024-10-26 RX ORDER — SUCRALFATE 1 G/1
1 TABLET ORAL
Status: DISCONTINUED | OUTPATIENT
Start: 2024-10-26 | End: 2024-10-26

## 2024-10-26 RX ADMIN — SODIUM CHLORIDE 8 MG/HR: 900 INJECTION INTRAVENOUS at 07:10

## 2024-10-26 RX ADMIN — LEVALBUTEROL HYDROCHLORIDE 1.25 MG: 1.25 SOLUTION RESPIRATORY (INHALATION) at 01:10

## 2024-10-26 RX ADMIN — SODIUM CHLORIDE 8 MG/HR: 900 INJECTION INTRAVENOUS at 02:10

## 2024-10-26 RX ADMIN — LEVALBUTEROL HYDROCHLORIDE 1.25 MG: 1.25 SOLUTION RESPIRATORY (INHALATION) at 07:10

## 2024-10-26 RX ADMIN — LINEZOLID 600 MG: 600 INJECTION, SOLUTION INTRAVENOUS at 02:10

## 2024-10-26 RX ADMIN — BUDESONIDE 0.5 MG: 0.5 INHALANT RESPIRATORY (INHALATION) at 07:10

## 2024-10-26 RX ADMIN — SEVELAMER CARBONATE 800 MG: 800 TABLET, FILM COATED ORAL at 06:10

## 2024-10-26 RX ADMIN — GABAPENTIN 100 MG: 100 CAPSULE ORAL at 09:10

## 2024-10-26 RX ADMIN — SEVELAMER CARBONATE 800 MG: 800 TABLET, FILM COATED ORAL at 09:10

## 2024-10-26 RX ADMIN — MIDODRINE HYDROCHLORIDE 5 MG: 5 TABLET ORAL at 06:10

## 2024-10-26 RX ADMIN — SUCRALFATE ORAL SUSPENSION 1 G: 1 SUSPENSION ORAL at 09:10

## 2024-10-26 RX ADMIN — ATORVASTATIN CALCIUM 80 MG: 80 TABLET, FILM COATED ORAL at 09:10

## 2024-10-27 LAB
ALBUMIN SERPL BCP-MCNC: 1.5 G/DL (ref 3.5–5)
ANION GAP SERPL CALCULATED.3IONS-SCNC: 14 MMOL/L (ref 7–16)
BASOPHILS # BLD AUTO: 0.02 K/UL (ref 0–0.2)
BASOPHILS NFR BLD AUTO: 0.2 % (ref 0–1)
BUN SERPL-MCNC: 39 MG/DL (ref 7–18)
BUN/CREAT SERPL: 7 (ref 6–20)
CALCIUM SERPL-MCNC: 8.1 MG/DL (ref 8.5–10.1)
CHLORIDE SERPL-SCNC: 100 MMOL/L (ref 98–107)
CO2 SERPL-SCNC: 23 MMOL/L (ref 21–32)
CREAT SERPL-MCNC: 5.35 MG/DL (ref 0.7–1.3)
DIFFERENTIAL METHOD BLD: ABNORMAL
EGFR (NO RACE VARIABLE) (RUSH/TITUS): 10 ML/MIN/1.73M2
EOSINOPHIL # BLD AUTO: 0.05 K/UL (ref 0–0.5)
EOSINOPHIL NFR BLD AUTO: 0.6 % (ref 1–4)
ERYTHROCYTE [DISTWIDTH] IN BLOOD BY AUTOMATED COUNT: 17.9 % (ref 11.5–14.5)
GLUCOSE SERPL-MCNC: 78 MG/DL (ref 74–106)
GLUCOSE SERPL-MCNC: 86 MG/DL (ref 70–105)
GLUCOSE SERPL-MCNC: 87 MG/DL (ref 70–105)
GLUCOSE SERPL-MCNC: 88 MG/DL (ref 70–105)
GLUCOSE SERPL-MCNC: 99 MG/DL (ref 70–105)
HCT VFR BLD AUTO: 27.2 % (ref 40–54)
HGB BLD-MCNC: 7.9 G/DL (ref 13.5–18)
IMM GRANULOCYTES # BLD AUTO: 0.08 K/UL (ref 0–0.04)
IMM GRANULOCYTES NFR BLD: 0.9 % (ref 0–0.4)
LYMPHOCYTES # BLD AUTO: 0.45 K/UL (ref 1–4.8)
LYMPHOCYTES NFR BLD AUTO: 5 % (ref 27–41)
MCH RBC QN AUTO: 28.4 PG (ref 27–31)
MCHC RBC AUTO-ENTMCNC: 29 G/DL (ref 32–36)
MCV RBC AUTO: 97.8 FL (ref 80–96)
MONOCYTES # BLD AUTO: 0.74 K/UL (ref 0–0.8)
MONOCYTES NFR BLD AUTO: 8.2 % (ref 2–6)
MPC BLD CALC-MCNC: 10.3 FL (ref 9.4–12.4)
NEUTROPHILS # BLD AUTO: 7.72 K/UL (ref 1.8–7.7)
NEUTROPHILS NFR BLD AUTO: 85.1 % (ref 53–65)
NRBC # BLD AUTO: 0 X10E3/UL
NRBC, AUTO (.00): 0 %
PHOSPHATE SERPL-MCNC: 5.4 MG/DL (ref 2.5–4.5)
PLATELET # BLD AUTO: 137 K/UL (ref 150–400)
POTASSIUM SERPL-SCNC: 3.9 MMOL/L (ref 3.5–5.1)
RBC # BLD AUTO: 2.78 M/UL (ref 4.6–6.2)
SODIUM SERPL-SCNC: 133 MMOL/L (ref 136–145)
WBC # BLD AUTO: 9.06 K/UL (ref 4.5–11)

## 2024-10-27 PROCEDURE — 25000242 PHARM REV CODE 250 ALT 637 W/ HCPCS: Performed by: INTERNAL MEDICINE

## 2024-10-27 PROCEDURE — 63600175 PHARM REV CODE 636 W HCPCS: Performed by: INTERNAL MEDICINE

## 2024-10-27 PROCEDURE — 99900035 HC TECH TIME PER 15 MIN (STAT)

## 2024-10-27 PROCEDURE — 25000242 PHARM REV CODE 250 ALT 637 W/ HCPCS: Performed by: STUDENT IN AN ORGANIZED HEALTH CARE EDUCATION/TRAINING PROGRAM

## 2024-10-27 PROCEDURE — 99232 SBSQ HOSP IP/OBS MODERATE 35: CPT | Mod: ,,, | Performed by: INTERNAL MEDICINE

## 2024-10-27 PROCEDURE — 11000001 HC ACUTE MED/SURG PRIVATE ROOM

## 2024-10-27 PROCEDURE — 25000003 PHARM REV CODE 250: Performed by: INTERNAL MEDICINE

## 2024-10-27 PROCEDURE — 82962 GLUCOSE BLOOD TEST: CPT

## 2024-10-27 PROCEDURE — 36415 COLL VENOUS BLD VENIPUNCTURE: CPT | Performed by: INTERNAL MEDICINE

## 2024-10-27 PROCEDURE — 80069 RENAL FUNCTION PANEL: CPT | Performed by: INTERNAL MEDICINE

## 2024-10-27 PROCEDURE — 94640 AIRWAY INHALATION TREATMENT: CPT

## 2024-10-27 PROCEDURE — 27000221 HC OXYGEN, UP TO 24 HOURS

## 2024-10-27 PROCEDURE — 99233 SBSQ HOSP IP/OBS HIGH 50: CPT | Mod: ,,, | Performed by: INTERNAL MEDICINE

## 2024-10-27 PROCEDURE — 85025 COMPLETE CBC W/AUTO DIFF WBC: CPT | Performed by: INTERNAL MEDICINE

## 2024-10-27 PROCEDURE — 94761 N-INVAS EAR/PLS OXIMETRY MLT: CPT

## 2024-10-27 RX ORDER — LINEZOLID 600 MG/1
600 TABLET, FILM COATED ORAL EVERY 12 HOURS
Status: COMPLETED | OUTPATIENT
Start: 2024-10-27 | End: 2024-10-28

## 2024-10-27 RX ADMIN — MIDODRINE HYDROCHLORIDE 5 MG: 5 TABLET ORAL at 08:10

## 2024-10-27 RX ADMIN — SODIUM CHLORIDE 8 MG/HR: 900 INJECTION INTRAVENOUS at 01:10

## 2024-10-27 RX ADMIN — SEVELAMER CARBONATE 800 MG: 800 TABLET, FILM COATED ORAL at 01:10

## 2024-10-27 RX ADMIN — SUCRALFATE ORAL SUSPENSION 1 G: 1 SUSPENSION ORAL at 06:10

## 2024-10-27 RX ADMIN — CARVEDILOL 3.12 MG: 3.12 TABLET, FILM COATED ORAL at 09:10

## 2024-10-27 RX ADMIN — ATORVASTATIN CALCIUM 80 MG: 80 TABLET, FILM COATED ORAL at 08:10

## 2024-10-27 RX ADMIN — LEVALBUTEROL HYDROCHLORIDE 1.25 MG: 1.25 SOLUTION RESPIRATORY (INHALATION) at 01:10

## 2024-10-27 RX ADMIN — LEVALBUTEROL HYDROCHLORIDE 1.25 MG: 1.25 SOLUTION RESPIRATORY (INHALATION) at 07:10

## 2024-10-27 RX ADMIN — SEVELAMER CARBONATE 800 MG: 800 TABLET, FILM COATED ORAL at 06:10

## 2024-10-27 RX ADMIN — GABAPENTIN 100 MG: 100 CAPSULE ORAL at 09:10

## 2024-10-27 RX ADMIN — SODIUM CHLORIDE 8 MG/HR: 900 INJECTION INTRAVENOUS at 07:10

## 2024-10-27 RX ADMIN — SODIUM CHLORIDE 8 MG/HR: 900 INJECTION INTRAVENOUS at 03:10

## 2024-10-27 RX ADMIN — LEVALBUTEROL HYDROCHLORIDE 1.25 MG: 1.25 SOLUTION RESPIRATORY (INHALATION) at 12:10

## 2024-10-27 RX ADMIN — GABAPENTIN 100 MG: 100 CAPSULE ORAL at 08:10

## 2024-10-27 RX ADMIN — BUDESONIDE 0.5 MG: 0.5 INHALANT RESPIRATORY (INHALATION) at 07:10

## 2024-10-27 RX ADMIN — LINEZOLID 600 MG: 600 INJECTION, SOLUTION INTRAVENOUS at 02:10

## 2024-10-27 RX ADMIN — LINEZOLID 600 MG: 600 TABLET, FILM COATED ORAL at 09:10

## 2024-10-27 RX ADMIN — CARVEDILOL 3.12 MG: 3.12 TABLET, FILM COATED ORAL at 08:10

## 2024-10-27 RX ADMIN — SEVELAMER CARBONATE 800 MG: 800 TABLET, FILM COATED ORAL at 08:10

## 2024-10-27 RX ADMIN — MIDODRINE HYDROCHLORIDE 5 MG: 5 TABLET ORAL at 06:10

## 2024-10-27 RX ADMIN — SUCRALFATE ORAL SUSPENSION 1 G: 1 SUSPENSION ORAL at 09:10

## 2024-10-27 RX ADMIN — LINEZOLID 600 MG: 600 TABLET, FILM COATED ORAL at 10:10

## 2024-10-28 VITALS
TEMPERATURE: 99 F | BODY MASS INDEX: 19.82 KG/M2 | SYSTOLIC BLOOD PRESSURE: 105 MMHG | HEIGHT: 71 IN | RESPIRATION RATE: 17 BRPM | WEIGHT: 141.56 LBS | OXYGEN SATURATION: 91 % | HEART RATE: 115 BPM | DIASTOLIC BLOOD PRESSURE: 61 MMHG

## 2024-10-28 PROBLEM — R65.20 SEVERE SEPSIS: Status: RESOLVED | Noted: 2024-10-17 | Resolved: 2024-10-28

## 2024-10-28 PROBLEM — A41.9 SEVERE SEPSIS: Status: RESOLVED | Noted: 2024-10-17 | Resolved: 2024-10-28

## 2024-10-28 LAB
ABO + RH BLD: NORMAL
ALBUMIN SERPL BCP-MCNC: 1.6 G/DL (ref 3.5–5)
ANION GAP SERPL CALCULATED.3IONS-SCNC: 13 MMOL/L (ref 7–16)
BASOPHILS # BLD AUTO: 0.02 K/UL (ref 0–0.2)
BASOPHILS NFR BLD AUTO: 0.3 % (ref 0–1)
BLD PROD TYP BPU: NORMAL
BLOOD UNIT EXPIRATION DATE: NORMAL
BLOOD UNIT TYPE CODE: 7300
BUN SERPL-MCNC: 50 MG/DL (ref 7–18)
BUN/CREAT SERPL: 7 (ref 6–20)
CALCIUM SERPL-MCNC: 7.4 MG/DL (ref 8.5–10.1)
CHLORIDE SERPL-SCNC: 101 MMOL/L (ref 98–107)
CO2 SERPL-SCNC: 26 MMOL/L (ref 21–32)
CREAT SERPL-MCNC: 6.79 MG/DL (ref 0.7–1.3)
CROSSMATCH INTERPRETATION: NORMAL
DIFFERENTIAL METHOD BLD: ABNORMAL
DISPENSE STATUS: NORMAL
EGFR (NO RACE VARIABLE) (RUSH/TITUS): 8 ML/MIN/1.73M2
EOSINOPHIL # BLD AUTO: 0.07 K/UL (ref 0–0.5)
EOSINOPHIL NFR BLD AUTO: 0.9 % (ref 1–4)
ERYTHROCYTE [DISTWIDTH] IN BLOOD BY AUTOMATED COUNT: 17.7 % (ref 11.5–14.5)
GLUCOSE SERPL-MCNC: 111 MG/DL (ref 70–105)
GLUCOSE SERPL-MCNC: 68 MG/DL (ref 70–105)
GLUCOSE SERPL-MCNC: 69 MG/DL (ref 70–105)
GLUCOSE SERPL-MCNC: 77 MG/DL (ref 70–105)
GLUCOSE SERPL-MCNC: 90 MG/DL (ref 74–106)
GLUCOSE SERPL-MCNC: 94 MG/DL (ref 70–105)
HCT VFR BLD AUTO: 24.3 % (ref 40–54)
HGB BLD-MCNC: 7.4 G/DL (ref 13.5–18)
IMM GRANULOCYTES # BLD AUTO: 0.05 K/UL (ref 0–0.04)
IMM GRANULOCYTES NFR BLD: 0.7 % (ref 0–0.4)
INDIRECT COOMBS: NORMAL
LYMPHOCYTES # BLD AUTO: 0.26 K/UL (ref 1–4.8)
LYMPHOCYTES NFR BLD AUTO: 3.5 % (ref 27–41)
MCH RBC QN AUTO: 28.9 PG (ref 27–31)
MCHC RBC AUTO-ENTMCNC: 30.5 G/DL (ref 32–36)
MCV RBC AUTO: 94.9 FL (ref 80–96)
MONOCYTES # BLD AUTO: 0.55 K/UL (ref 0–0.8)
MONOCYTES NFR BLD AUTO: 7.4 % (ref 2–6)
MPC BLD CALC-MCNC: 10.4 FL (ref 9.4–12.4)
NEUTROPHILS # BLD AUTO: 6.45 K/UL (ref 1.8–7.7)
NEUTROPHILS NFR BLD AUTO: 87.2 % (ref 53–65)
NRBC # BLD AUTO: 0 X10E3/UL
NRBC, AUTO (.00): 0 %
PHOSPHATE SERPL-MCNC: 6.9 MG/DL (ref 2.5–4.5)
PLATELET # BLD AUTO: 119 K/UL (ref 150–400)
POTASSIUM SERPL-SCNC: 4.1 MMOL/L (ref 3.5–5.1)
RBC # BLD AUTO: 2.56 M/UL (ref 4.6–6.2)
RH BLD: NORMAL
SODIUM SERPL-SCNC: 136 MMOL/L (ref 136–145)
SPECIMEN OUTDATE: NORMAL
UNIT NUMBER: NORMAL
WBC # BLD AUTO: 7.4 K/UL (ref 4.5–11)

## 2024-10-28 PROCEDURE — 82962 GLUCOSE BLOOD TEST: CPT

## 2024-10-28 PROCEDURE — 25000242 PHARM REV CODE 250 ALT 637 W/ HCPCS: Performed by: STUDENT IN AN ORGANIZED HEALTH CARE EDUCATION/TRAINING PROGRAM

## 2024-10-28 PROCEDURE — 86900 BLOOD TYPING SEROLOGIC ABO: CPT | Performed by: INTERNAL MEDICINE

## 2024-10-28 PROCEDURE — 25000003 PHARM REV CODE 250: Performed by: INTERNAL MEDICINE

## 2024-10-28 PROCEDURE — 99900035 HC TECH TIME PER 15 MIN (STAT)

## 2024-10-28 PROCEDURE — 80069 RENAL FUNCTION PANEL: CPT | Performed by: INTERNAL MEDICINE

## 2024-10-28 PROCEDURE — 36415 COLL VENOUS BLD VENIPUNCTURE: CPT | Performed by: INTERNAL MEDICINE

## 2024-10-28 PROCEDURE — 86923 COMPATIBILITY TEST ELECTRIC: CPT | Performed by: INTERNAL MEDICINE

## 2024-10-28 PROCEDURE — 25000242 PHARM REV CODE 250 ALT 637 W/ HCPCS: Performed by: INTERNAL MEDICINE

## 2024-10-28 PROCEDURE — P9016 RBC LEUKOCYTES REDUCED: HCPCS | Performed by: INTERNAL MEDICINE

## 2024-10-28 PROCEDURE — 99239 HOSP IP/OBS DSCHRG MGMT >30: CPT | Mod: ,,, | Performed by: INTERNAL MEDICINE

## 2024-10-28 PROCEDURE — 94761 N-INVAS EAR/PLS OXIMETRY MLT: CPT

## 2024-10-28 PROCEDURE — 27000221 HC OXYGEN, UP TO 24 HOURS

## 2024-10-28 PROCEDURE — 86850 RBC ANTIBODY SCREEN: CPT | Performed by: INTERNAL MEDICINE

## 2024-10-28 PROCEDURE — 94640 AIRWAY INHALATION TREATMENT: CPT

## 2024-10-28 PROCEDURE — 63600175 PHARM REV CODE 636 W HCPCS: Performed by: INTERNAL MEDICINE

## 2024-10-28 PROCEDURE — 90935 HEMODIALYSIS ONE EVALUATION: CPT

## 2024-10-28 PROCEDURE — 85025 COMPLETE CBC W/AUTO DIFF WBC: CPT | Performed by: INTERNAL MEDICINE

## 2024-10-28 RX ORDER — HYDROCODONE BITARTRATE AND ACETAMINOPHEN 10; 325 MG/1; MG/1
1 TABLET ORAL 3 TIMES DAILY PRN
Qty: 10 TABLET | Refills: 0 | Status: SHIPPED | OUTPATIENT
Start: 2024-10-28

## 2024-10-28 RX ORDER — MIDODRINE HYDROCHLORIDE 5 MG/1
5 TABLET ORAL 2 TIMES DAILY WITH MEALS
Qty: 60 TABLET | Refills: 11 | Status: SHIPPED | OUTPATIENT
Start: 2024-10-28 | End: 2025-10-28

## 2024-10-28 RX ORDER — CARVEDILOL 3.12 MG/1
3.12 TABLET ORAL 2 TIMES DAILY
Start: 2024-10-28 | End: 2025-10-28

## 2024-10-28 RX ORDER — HYDROCODONE BITARTRATE AND ACETAMINOPHEN 500; 5 MG/1; MG/1
TABLET ORAL
Status: DISCONTINUED | OUTPATIENT
Start: 2024-10-28 | End: 2024-10-29 | Stop reason: HOSPADM

## 2024-10-28 RX ORDER — SUCRALFATE 1 G/10ML
1 SUSPENSION ORAL
Start: 2024-10-28 | End: 2024-11-27

## 2024-10-28 RX ORDER — PANTOPRAZOLE SODIUM 40 MG/1
40 TABLET, DELAYED RELEASE ORAL 2 TIMES DAILY
Start: 2024-10-28 | End: 2025-01-26

## 2024-10-28 RX ADMIN — SODIUM CHLORIDE 8 MG/HR: 900 INJECTION INTRAVENOUS at 12:10

## 2024-10-28 RX ADMIN — CARVEDILOL 3.12 MG: 3.12 TABLET, FILM COATED ORAL at 08:10

## 2024-10-28 RX ADMIN — ATORVASTATIN CALCIUM 80 MG: 80 TABLET, FILM COATED ORAL at 08:10

## 2024-10-28 RX ADMIN — SUCRALFATE ORAL SUSPENSION 1 G: 1 SUSPENSION ORAL at 06:10

## 2024-10-28 RX ADMIN — SEVELAMER CARBONATE 800 MG: 800 TABLET, FILM COATED ORAL at 08:10

## 2024-10-28 RX ADMIN — LEVALBUTEROL HYDROCHLORIDE 1.25 MG: 1.25 SOLUTION RESPIRATORY (INHALATION) at 07:10

## 2024-10-28 RX ADMIN — MIDODRINE HYDROCHLORIDE 5 MG: 5 TABLET ORAL at 04:10

## 2024-10-28 RX ADMIN — MIDODRINE HYDROCHLORIDE 5 MG: 5 TABLET ORAL at 08:10

## 2024-10-28 RX ADMIN — BUDESONIDE 0.5 MG: 0.5 INHALANT RESPIRATORY (INHALATION) at 07:10

## 2024-10-28 RX ADMIN — SODIUM CHLORIDE 8 MG/HR: 900 INJECTION INTRAVENOUS at 06:10

## 2024-10-28 RX ADMIN — GABAPENTIN 100 MG: 100 CAPSULE ORAL at 08:10

## 2024-10-28 RX ADMIN — LEVALBUTEROL HYDROCHLORIDE 1.25 MG: 1.25 SOLUTION RESPIRATORY (INHALATION) at 12:10

## 2024-10-28 RX ADMIN — SUCRALFATE ORAL SUSPENSION 1 G: 1 SUSPENSION ORAL at 04:10

## 2024-10-28 RX ADMIN — SUCRALFATE ORAL SUSPENSION 1 G: 1 SUSPENSION ORAL at 08:10

## 2024-10-28 RX ADMIN — LINEZOLID 600 MG: 600 TABLET, FILM COATED ORAL at 08:10

## 2024-10-28 RX ADMIN — SEVELAMER CARBONATE 800 MG: 800 TABLET, FILM COATED ORAL at 04:10

## 2024-11-21 ENCOUNTER — OFFICE VISIT (OUTPATIENT)
Dept: CARDIOLOGY | Facility: CLINIC | Age: 74
End: 2024-11-21
Payer: MEDICARE

## 2024-11-21 VITALS
SYSTOLIC BLOOD PRESSURE: 108 MMHG | DIASTOLIC BLOOD PRESSURE: 70 MMHG | BODY MASS INDEX: 19.74 KG/M2 | HEIGHT: 71 IN | OXYGEN SATURATION: 89 % | HEART RATE: 105 BPM

## 2024-11-21 DIAGNOSIS — I25.10 CORONARY ARTERY DISEASE INVOLVING NATIVE CORONARY ARTERY OF NATIVE HEART WITHOUT ANGINA PECTORIS: Primary | ICD-10-CM

## 2024-11-21 DIAGNOSIS — I50.22 CHRONIC SYSTOLIC HEART FAILURE: ICD-10-CM

## 2024-11-21 PROCEDURE — 99999 PR PBB SHADOW E&M-EST. PATIENT-LVL IV: CPT | Mod: PBBFAC,,, | Performed by: REGISTERED NURSE

## 2024-11-21 PROCEDURE — 99212 OFFICE O/P EST SF 10 MIN: CPT | Mod: S$PBB,,, | Performed by: REGISTERED NURSE

## 2024-11-21 PROCEDURE — 99214 OFFICE O/P EST MOD 30 MIN: CPT | Mod: PBBFAC | Performed by: REGISTERED NURSE

## 2024-11-21 NOTE — ASSESSMENT & PLAN NOTE
[facility] 8/27/24 were he was found to have 1 vessel CAD LAD AND LCX w/ 65% in-stent stenosis  Continue statin coreg

## 2024-11-21 NOTE — PROGRESS NOTES
PCP: Norma, Primary Doctor    Referring Provider:     Subjective:   Michoacano Damon is a 74 y.o. male with hx of  HTN, ESRD, CAD s/p multivessel PCI, COPD, who presents for follow up.     11/21/24: seen today in f/u after [facility] 8/27/24 were he was found to have 1 vessel CAD LAD AND LCX w/ 65% in-stent stenosis. No cardiac complaints today. Denies chest discomfort, palpitations. Chronic sob due to COPD.     Pt states yesterday upon awakening he was having difficulty breathing. He states he was unable to vacuum yesterday due to the SOB and fatigue. He used his brother's inhaler with improvement.  He went to dialysis yesterday.  This morning he had SOB but not as bad as yesterday.      Fhx:  Shx: Former smoker, quit 5 months ago.         EKG   11/21/24: Sinus tachycardia occ PVCT wave abnormality septal and inferior leads  6/22/21:  Sinus rhythm with PVCs  ECHO 6/20/21:  Normal left ventricular size and systolic function . EF 65%.   Moderate concentric LVH with mild diastolic dysfunction. Estimated PA systolic pressure 50 mmHg.    [facility] 8/27/2024      The Dist Cx lesion was 65% stenosed.    The left ventricular end diastolic pressure was decreased.    The pre-procedure left ventricular end diastolic pressure was 2.    The estimated blood loss was none.    There was single vessel coronary artery disease.     Co-dominant, single vessel CAD - patent stents in the LAD and LCX with 65% in-stent restenosis.  Low left sided filling pressures, LVEDP - 2mmHg     Plan:  Medical management and risk factor modification  Type II MI     The procedure log was documented by Documenter: Tona Long RN and verified by Oziel Santos MD.     Date: 8/27/2024  Time: 4:25 PM         [facility]  6/22/21:  Widely patent mid LAD stent.  PCI of distal RCA with 2.5 x 15 mm MASON.   [facility]  1/22/2020: PCI of LAD and LCx               Lab Results   Component Value Date     10/28/2024    K 4.1 10/28/2024     10/28/2024    CO2 26 10/28/2024    BUN 50 (H)  "10/28/2024    CREATININE 6.79 (H) 10/28/2024    CALCIUM 7.4 (L) 10/28/2024    ANIONGAP 13 10/28/2024    ESTGFRAFRICA 10 (L) 04/20/2021    EGFRNONAA 7 04/10/2021       Lab Results   Component Value Date    CHOL 104 07/31/2024    CHOL 158 07/18/2023     Lab Results   Component Value Date    HDL 64 (H) 07/31/2024    HDL 93 (H) 07/18/2023     Lab Results   Component Value Date    LDLCALC 9 07/31/2024    LDLCALC 55 07/18/2023     Lab Results   Component Value Date    TRIG 153 (H) 07/31/2024    TRIG 51 07/18/2023     Lab Results   Component Value Date    CHOLHDL 1.6 07/31/2024    CHOLHDL 1.7 07/18/2023       Lab Results   Component Value Date    WBC 7.40 10/28/2024    HGB 7.4 (L) 10/28/2024    HCT 24.3 (L) 10/28/2024    MCV 94.9 10/28/2024     (L) 10/28/2024           Review of Systems   Constitutional:  Negative for malaise/fatigue.   Respiratory:  Positive for shortness of breath. Negative for cough.    Cardiovascular:  Negative for chest pain, palpitations, orthopnea, claudication, leg swelling and PND.   All other systems reviewed and are negative.        Objective:   /70 (BP Location: Left arm, Patient Position: Sitting)   Pulse 105   Ht 5' 11" (1.803 m)   SpO2 (!) 89%   BMI 19.74 kg/m²     Physical Exam  HENT:      Head: Normocephalic.   Eyes:      Extraocular Movements: Extraocular movements intact.   Cardiovascular:      Rate and Rhythm: Normal rate and regular rhythm.      Pulses: Normal pulses.      Heart sounds: Normal heart sounds. No murmur heard.  Pulmonary:      Effort: Pulmonary effort is normal.      Breath sounds: No wheezing or rales.   Musculoskeletal:         General: No swelling.      Cervical back: Normal range of motion.   Skin:     General: Skin is warm and dry.      Capillary Refill: Capillary refill takes less than 2 seconds.   Neurological:      Mental Status: He is alert and oriented to person, place, and time.           Assessment:     1. Coronary artery disease involving " native coronary artery of native heart without angina pectoris        2. Chronic systolic heart failure  ACCEPT - Ambulatory referral/consult to Cardiology    EKG 12-lead            Plan:   Coronary artery disease  [facility] 8/27/24 were he was found to have 1 vessel CAD LAD AND LCX w/ 65% in-stent stenosis  Continue statin coreg    Chronic systolic heart failure  Systolic and diastolic dysfunction EF 45%  ischemic cardiomyopathy   BP labile unable to add GDMT  Currently on HD due to ckd  Continue coreg

## 2024-11-21 NOTE — ASSESSMENT & PLAN NOTE
Systolic and diastolic dysfunction EF 45%  ischemic cardiomyopathy   BP labile unable to add GDMT  Currently on HD due to ckd  Continue coreg

## 2025-02-27 DIAGNOSIS — J44.9 COPD (CHRONIC OBSTRUCTIVE PULMONARY DISEASE): Primary | ICD-10-CM

## 2025-03-12 DIAGNOSIS — M54.9 CHRONIC BACK PAIN: Primary | ICD-10-CM

## 2025-03-12 DIAGNOSIS — G89.29 CHRONIC BACK PAIN: Primary | ICD-10-CM

## 2025-04-28 ENCOUNTER — TELEPHONE (OUTPATIENT)
Dept: SURGERY | Facility: CLINIC | Age: 75
End: 2025-04-28
Payer: MEDICARE

## 2025-04-28 NOTE — TELEPHONE ENCOUNTER
Kasie from dialysis called to report patients access flow return is low. He is still getting good dialysis. He does have 2-3 sticks every dialysis and will have clots. Kasie scheduled patient for fistulogram with Dr Elaine but unable to get appt until 6/12/25. She will call if any problems.

## 2025-05-28 DIAGNOSIS — I50.22 CHRONIC SYSTOLIC HEART FAILURE: Primary | ICD-10-CM

## 2025-06-27 ENCOUNTER — HOSPITAL ENCOUNTER (EMERGENCY)
Facility: HOSPITAL | Age: 75
Discharge: HOME | End: 2025-06-28
Attending: EMERGENCY MEDICINE
Payer: MEDICARE

## 2025-06-27 DIAGNOSIS — M79.2 NEUROPATHIC PAIN: Primary | ICD-10-CM

## 2025-06-27 PROCEDURE — 85025 COMPLETE CBC W/AUTO DIFF WBC: CPT | Performed by: EMERGENCY MEDICINE

## 2025-06-27 PROCEDURE — 36415 COLL VENOUS BLD VENIPUNCTURE: CPT | Performed by: EMERGENCY MEDICINE

## 2025-06-27 PROCEDURE — 80053 COMPREHEN METABOLIC PANEL: CPT | Performed by: EMERGENCY MEDICINE

## 2025-06-27 PROCEDURE — 99284 EMERGENCY DEPT VISIT MOD MDM: CPT

## 2025-06-27 PROCEDURE — 86141 C-REACTIVE PROTEIN HS: CPT | Performed by: EMERGENCY MEDICINE

## 2025-06-28 VITALS
OXYGEN SATURATION: 96 % | TEMPERATURE: 99 F | DIASTOLIC BLOOD PRESSURE: 64 MMHG | HEIGHT: 71 IN | SYSTOLIC BLOOD PRESSURE: 126 MMHG | BODY MASS INDEX: 23.1 KG/M2 | WEIGHT: 165 LBS | HEART RATE: 107 BPM | RESPIRATION RATE: 15 BRPM

## 2025-06-28 PROBLEM — M79.2 NEUROPATHIC PAIN: Status: ACTIVE | Noted: 2025-06-28

## 2025-06-28 LAB
ALBUMIN SERPL BCP-MCNC: 3.4 G/DL (ref 3.4–4.8)
ALBUMIN/GLOB SERPL: 0.9 {RATIO}
ALP SERPL-CCNC: 134 U/L (ref 40–150)
ALT SERPL W P-5'-P-CCNC: <7 U/L
ANION GAP SERPL CALCULATED.3IONS-SCNC: 16 MMOL/L (ref 7–16)
AST SERPL W P-5'-P-CCNC: 22 U/L (ref 11–45)
BASOPHILS # BLD AUTO: 0.04 K/UL (ref 0–0.2)
BASOPHILS NFR BLD AUTO: 1.1 % (ref 0–1)
BILIRUB SERPL-MCNC: 0.3 MG/DL
BUN SERPL-MCNC: 22 MG/DL (ref 8–26)
BUN/CREAT SERPL: 4 (ref 6–20)
CALCIUM SERPL-MCNC: 9 MG/DL (ref 8.8–10)
CHLORIDE SERPL-SCNC: 95 MMOL/L (ref 98–107)
CO2 SERPL-SCNC: 29 MMOL/L (ref 23–31)
CREAT SERPL-MCNC: 5.81 MG/DL (ref 0.72–1.25)
CRP SERPL HS-MCNC: 9.22 MG/L
EGFR (NO RACE VARIABLE) (RUSH/TITUS): 10 ML/MIN/1.73M2
EOSINOPHIL # BLD AUTO: 0.23 K/UL (ref 0–0.5)
EOSINOPHIL NFR BLD AUTO: 6.1 % (ref 1–4)
ERYTHROCYTE [DISTWIDTH] IN BLOOD BY AUTOMATED COUNT: 16.5 % (ref 11.5–14.5)
GLOBULIN SER-MCNC: 3.9 G/DL (ref 2–4)
GLUCOSE SERPL-MCNC: 95 MG/DL (ref 82–115)
HCT VFR BLD AUTO: 35.3 % (ref 40–54)
HGB BLD-MCNC: 11 G/DL (ref 13.5–18)
IMM GRANULOCYTES # BLD AUTO: 0.02 K/UL (ref 0–0.04)
IMM GRANULOCYTES NFR BLD: 0.5 % (ref 0–0.4)
LYMPHOCYTES # BLD AUTO: 0.94 K/UL (ref 1–4.8)
LYMPHOCYTES NFR BLD AUTO: 24.8 % (ref 27–41)
MCH RBC QN AUTO: 29.2 PG (ref 27–31)
MCHC RBC AUTO-ENTMCNC: 31.2 G/DL (ref 32–36)
MCV RBC AUTO: 93.6 FL (ref 80–96)
MONOCYTES # BLD AUTO: 0.61 K/UL (ref 0–0.8)
MONOCYTES NFR BLD AUTO: 16.1 % (ref 2–6)
MPC BLD CALC-MCNC: 11.7 FL (ref 9.4–12.4)
NEUTROPHILS # BLD AUTO: 1.95 K/UL (ref 1.8–7.7)
NEUTROPHILS NFR BLD AUTO: 51.4 % (ref 53–65)
NRBC # BLD AUTO: 0 X10E3/UL
NRBC, AUTO (.00): 0 %
PLATELET # BLD AUTO: 116 K/UL (ref 150–400)
POTASSIUM SERPL-SCNC: 3.7 MMOL/L (ref 3.5–5.1)
PROT SERPL-MCNC: 7.3 G/DL (ref 5.8–7.6)
RBC # BLD AUTO: 3.77 M/UL (ref 4.6–6.2)
SODIUM SERPL-SCNC: 136 MMOL/L (ref 136–145)
WBC # BLD AUTO: 3.79 K/UL (ref 4.5–11)

## 2025-06-28 PROCEDURE — 63600175 PHARM REV CODE 636 W HCPCS: Performed by: EMERGENCY MEDICINE

## 2025-06-28 PROCEDURE — 25000003 PHARM REV CODE 250: Performed by: EMERGENCY MEDICINE

## 2025-06-28 PROCEDURE — 96372 THER/PROPH/DIAG INJ SC/IM: CPT | Performed by: EMERGENCY MEDICINE

## 2025-06-28 RX ORDER — MORPHINE SULFATE 4 MG/ML
4 INJECTION, SOLUTION INTRAMUSCULAR; INTRAVENOUS
Refills: 0 | Status: DISCONTINUED | OUTPATIENT
Start: 2025-06-28 | End: 2025-06-28

## 2025-06-28 RX ORDER — MORPHINE SULFATE 4 MG/ML
4 INJECTION, SOLUTION INTRAMUSCULAR; INTRAVENOUS
Status: COMPLETED | OUTPATIENT
Start: 2025-06-28 | End: 2025-06-28

## 2025-06-28 RX ORDER — ONDANSETRON 4 MG/1
4 TABLET, ORALLY DISINTEGRATING ORAL
Status: COMPLETED | OUTPATIENT
Start: 2025-06-28 | End: 2025-06-28

## 2025-06-28 RX ORDER — ONDANSETRON HYDROCHLORIDE 2 MG/ML
4 INJECTION, SOLUTION INTRAVENOUS
Status: DISCONTINUED | OUTPATIENT
Start: 2025-06-28 | End: 2025-06-28

## 2025-06-28 RX ADMIN — MORPHINE SULFATE 4 MG: 4 INJECTION INTRAVENOUS at 12:06

## 2025-06-28 RX ADMIN — ONDANSETRON 4 MG: 4 TABLET, ORALLY DISINTEGRATING ORAL at 12:06

## 2025-06-28 NOTE — ED PROVIDER NOTES
Encounter Date: 6/27/2025    SCRIBE #1 NOTE: I, Raul Jeffers, am scribing for, and in the presence of,  Bry Crump MD. I have scribed the entire note.       History     Chief Complaint   Patient presents with    Leg Pain     Michoacano Damon is a 74 y.o. male presenting to the ED via EMS with c/o leg and feet pain. PT describes the pain as feeling like fire. PT also reports lower back pain. PT had dialysis today. PT has Hx of Anticoagulant long-term use, COPD, CAD, ESRD on dialysis, GERD, HFrEF, Hyperlipidemia, Hypertension, and Myocardial infarct.    The history is provided by the patient. No  was used.     Review of patient's allergies indicates:  No Known Allergies  Past Medical History:   Diagnosis Date    Anticoagulant long-term use     COPD (chronic obstructive pulmonary disease)     Coronary artery disease     ESRD on dialysis     GERD (gastroesophageal reflux disease)     HFrEF (heart failure with reduced ejection fraction) 2021    Hyperlipidemia     Hypertension      Past Surgical History:   Procedure Laterality Date    ANGIOGRAM, CORONARY, WITH LEFT HEART CATHETERIZATION N/A 8/27/2024    Procedure: Angiogram, Coronary, with Left Heart Cath;  Surgeon: Oziel Santos MD;  Location: [facility] CATH LAB;  Service: Cardiology;  Laterality: N/A;    AV FISTULA PLACEMENT, BRACHIOCEPHALIC      INSERTION OF DIALYSIS CATHETER       Family History   Problem Relation Name Age of Onset    Stroke Mother       Social History[1]  Review of Systems   Musculoskeletal:  Positive for back pain.        PT reports leg and feet pain.   All other systems reviewed and are negative.      Physical Exam     Initial Vitals   BP Pulse Resp Temp SpO2   06/27/25 2309 06/27/25 2308 06/27/25 2308 06/27/25 2306 06/27/25 2308   111/72 106 14 99.3 °F (37.4 °C) 98 %      MAP       --                Physical Exam    Nursing note and vitals reviewed.  Constitutional: He appears well-developed and well-nourished.   HENT:    Head: Normocephalic and atraumatic.   Eyes: Conjunctivae and EOM are normal. Pupils are equal, round, and reactive to light.   Neck: Neck supple.   Normal range of motion.  Cardiovascular:  Normal rate, regular rhythm, normal heart sounds and intact distal pulses.           Pulmonary/Chest: Breath sounds normal.   Abdominal: Abdomen is soft. Bowel sounds are normal.   Musculoskeletal:         General: Normal range of motion.      Cervical back: Normal range of motion and neck supple.     Neurological: He is alert and oriented to person, place, and time. He has normal strength.   Skin: Skin is warm and dry. Capillary refill takes less than 2 seconds.   Psychiatric: He has a normal mood and affect. Thought content normal.       ED Course   Procedures  Labs Reviewed   COMPREHENSIVE METABOLIC PANEL - Abnormal       Result Value    Sodium 136      Potassium 3.7      Chloride 95 (*)     CO2 29      Anion Gap 16      Glucose 95      BUN 22      Creatinine 5.81 (*)     BUN/Creatinine Ratio 4 (*)     Calcium 9.0      Total Protein 7.3      Albumin 3.4      Globulin 3.9      A/G Ratio 0.9      Bilirubin, Total 0.3      Alk Phos 134      ALT <7      AST 22      eGFR 10 (*)    HIGH SENSITIVITY CRP - Abnormal    CRP, High Senstivity 9.22 (*)    CBC WITH DIFFERENTIAL - Abnormal    WBC 3.79 (*)     RBC 3.77 (*)     Hemoglobin 11.0 (*)     Hematocrit 35.3 (*)     MCV 93.6      MCH 29.2      MCHC 31.2 (*)     RDW 16.5 (*)     Platelet Count 116 (*)     MPV 11.7      Neutrophils % 51.4 (*)     Lymphocytes % 24.8 (*)     Monocytes % 16.1 (*)     Eosinophils % 6.1 (*)     Basophils % 1.1 (*)     Immature Granulocytes % 0.5 (*)     nRBC, Auto 0.0      Neutrophils, Abs 1.95      Lymphocytes, Absolute 0.94 (*)     Monocytes, Absolute 0.61      Eosinophils, Absolute 0.23      Basophils, Absolute 0.04      Immature Granulocytes, Absolute 0.02      nRBC, Absolute 0.00     CBC W/ AUTO DIFFERENTIAL    Narrative:     The following orders were  created for panel order CBC auto differential.  Procedure                               Abnormality         Status                     ---------                               -----------         ------                     CBC with Differential[[id number]]       Abnormal            Final result                 Please view results for these tests on the individual orders.          Imaging Results    None          Medications   morphine injection 4 mg (4 mg Intramuscular Given 6/28/25 0028)   ondansetron disintegrating tablet 4 mg (4 mg Oral Given 6/28/25 0028)     Medical Decision Making  Michoacano Damon is a 74 y.o. male presenting to the ED via EMS with c/o leg and feet pain. PT describes the pain as feeling like fire. PT also reports lower back pain. PT had dialysis today. PT has Hx of Anticoagulant long-term use, COPD, CAD, ESRD on dialysis, GERD, HFrEF, Hyperlipidemia, Hypertension, and Myocardial infarct.    DDX: NEUROPATHY VS ELECTROLYTE ABNORMALITY VS LOW BACK PAIN WITH SCIATICA    Amount and/or Complexity of Data Reviewed  Labs: ordered. Decision-making details documented in ED Course.    Risk  Prescription drug management.              Attending Attestation:           Physician Attestation for Scribe:  Physician Attestation Statement for Scribe #1: I, Bry Cristina MD, reviewed documentation, as scribed by Raul Jeffers in my presence, and it is both accurate and complete.                                    Clinical Impression:  Final diagnoses:  [M79.2] Neuropathic pain (Primary)          ED Disposition Condition    Discharge Stable          ED Prescriptions    None       Follow-up Information       Follow up With Specialties Details Why Contact Info    [facility] - Emergency Department Emergency Medicine  As needed [facility] [address] [place] [id number] [phone]                   [1]   Social History  Tobacco Use    Smoking status: Every Day     Current packs/day: 1.00     Average  packs/day: 1 pack/day for 15.0 years (15.0 ttl pk-yrs)     Types: Cigarettes    Smokeless tobacco: Never   Substance Use Topics    Alcohol use: Not Currently    Drug use: Not Currently        Bry Crump MD  07/27/25 0749

## 2025-06-29 LAB
OHS QRS DURATION: 92 MS
OHS QTC CALCULATION: 419 MS